# Patient Record
Sex: FEMALE | Race: BLACK OR AFRICAN AMERICAN | NOT HISPANIC OR LATINO | Employment: OTHER | URBAN - METROPOLITAN AREA
[De-identification: names, ages, dates, MRNs, and addresses within clinical notes are randomized per-mention and may not be internally consistent; named-entity substitution may affect disease eponyms.]

---

## 2023-03-20 DIAGNOSIS — I10 BENIGN ESSENTIAL HYPERTENSION: Primary | ICD-10-CM

## 2023-03-20 DIAGNOSIS — E78.2 MIXED HYPERLIPIDEMIA: ICD-10-CM

## 2023-03-20 RX ORDER — VALSARTAN AND HYDROCHLOROTHIAZIDE 160; 12.5 MG/1; MG/1
1 TABLET, FILM COATED ORAL DAILY
COMMUNITY
Start: 2014-04-28 | End: 2023-03-20 | Stop reason: SDUPTHER

## 2023-03-20 RX ORDER — ATORVASTATIN CALCIUM 10 MG/1
10 TABLET, FILM COATED ORAL NIGHTLY
COMMUNITY
Start: 2014-05-27 | End: 2023-03-20 | Stop reason: SDUPTHER

## 2023-03-24 RX ORDER — ATORVASTATIN CALCIUM 10 MG/1
10 TABLET, FILM COATED ORAL NIGHTLY
Qty: 30 TABLET | Refills: 3 | Status: SHIPPED | OUTPATIENT
Start: 2023-03-24

## 2023-03-24 RX ORDER — VALSARTAN AND HYDROCHLOROTHIAZIDE 160; 12.5 MG/1; MG/1
1 TABLET, FILM COATED ORAL DAILY
Qty: 30 TABLET | Refills: 3 | Status: SHIPPED | OUTPATIENT
Start: 2023-03-24

## 2023-03-29 ENCOUNTER — TELEPHONE (OUTPATIENT)
Dept: PRIMARY CARE | Facility: CLINIC | Age: 88
End: 2023-03-29
Payer: MEDICARE

## 2023-04-17 ENCOUNTER — DOCUMENTATION (OUTPATIENT)
Dept: PRIMARY CARE | Facility: CLINIC | Age: 88
End: 2023-04-17
Payer: MEDICARE

## 2023-04-18 NOTE — PROGRESS NOTES
Patient called requesting CT results. I am on call today, so I returned patient's phone call. Discussed that the CT showed a small area of fat necrosis in her abdominal wall and a chronic 1.7cm lesion on her liver that has been there for several years. CT shows slight enlargement. Patient asked if this could be causing her abdominal pain. I explained that I have not seen or examined patient previously, but that it seems unlikely that the liver lesion is causing her pain. Discussed that she could get an MRI to further characterize lesion. She states she would not be interested in surgery, so I agreed with her that it would be reasonable to not do further investigation.

## 2023-05-19 DIAGNOSIS — I10 BENIGN ESSENTIAL HYPERTENSION: Primary | ICD-10-CM

## 2023-05-19 RX ORDER — METOPROLOL SUCCINATE 100 MG/1
1 TABLET, EXTENDED RELEASE ORAL DAILY
COMMUNITY
Start: 2014-05-27 | End: 2023-05-19 | Stop reason: SDUPTHER

## 2023-05-20 RX ORDER — METOPROLOL SUCCINATE 100 MG/1
100 TABLET, EXTENDED RELEASE ORAL DAILY
Qty: 90 TABLET | Refills: 1 | Status: SHIPPED | OUTPATIENT
Start: 2023-05-20 | End: 2023-11-17 | Stop reason: SDUPTHER

## 2023-09-20 PROBLEM — H61.21 EXCESSIVE CERUMEN IN EAR CANAL, RIGHT: Status: ACTIVE | Noted: 2023-09-20

## 2023-09-20 PROBLEM — R92.8 ABNORMAL FINDING ON BREAST IMAGING: Status: ACTIVE | Noted: 2023-09-20

## 2023-09-20 PROBLEM — R20.2 RIGHT HAND PARESTHESIA: Status: ACTIVE | Noted: 2023-09-20

## 2023-09-20 PROBLEM — R82.90 ABNORMAL URINALYSIS: Status: ACTIVE | Noted: 2023-09-20

## 2023-09-20 PROBLEM — R56.9 CONVULSIONS (MULTI): Status: ACTIVE | Noted: 2023-09-20

## 2023-09-20 PROBLEM — E78.2 COMBINED HYPERLIPIDEMIA: Status: ACTIVE | Noted: 2023-09-20

## 2023-09-20 PROBLEM — H61.20 CERUMEN IMPACTION: Status: ACTIVE | Noted: 2023-09-20

## 2023-09-20 PROBLEM — I42.2 HYPERTROPHIC CARDIOMYOPATHY (MULTI): Status: ACTIVE | Noted: 2023-09-20

## 2023-09-20 PROBLEM — D64.9 ANEMIA: Status: ACTIVE | Noted: 2023-09-20

## 2023-09-20 PROBLEM — R33.9 URINARY RETENTION: Status: ACTIVE | Noted: 2023-09-20

## 2023-09-20 PROBLEM — I10 HYPERTENSION: Status: ACTIVE | Noted: 2023-09-20

## 2023-09-20 PROBLEM — C50.911 BREAST CANCER, RIGHT (MULTI): Status: ACTIVE | Noted: 2023-09-20

## 2023-09-20 PROBLEM — I51.7 LVH (LEFT VENTRICULAR HYPERTROPHY): Status: ACTIVE | Noted: 2023-09-20

## 2023-09-20 PROBLEM — M47.816 OSTEOARTHRITIS OF LUMBAR SPINE: Status: ACTIVE | Noted: 2023-09-20

## 2023-09-20 PROBLEM — M79.645 PAIN OF FINGER OF LEFT HAND: Status: ACTIVE | Noted: 2023-09-20

## 2023-09-20 PROBLEM — I70.0 ATHEROSCLEROSIS OF AORTA (CMS-HCC): Status: ACTIVE | Noted: 2023-09-20

## 2023-09-20 PROBLEM — M54.16 LUMBAR RADICULOPATHY: Status: ACTIVE | Noted: 2023-09-20

## 2023-09-20 PROBLEM — N63.10 BREAST MASS, RIGHT: Status: ACTIVE | Noted: 2023-09-20

## 2023-09-20 PROBLEM — N39.0 ACUTE UTI: Status: ACTIVE | Noted: 2023-09-20

## 2023-09-20 PROBLEM — E83.52 HYPERCALCEMIA: Status: ACTIVE | Noted: 2023-09-20

## 2023-09-20 PROBLEM — M81.0 OSTEOPOROSIS: Status: ACTIVE | Noted: 2023-09-20

## 2023-09-20 RX ORDER — LEVETIRACETAM 500 MG/1
2 TABLET, EXTENDED RELEASE ORAL DAILY
COMMUNITY
Start: 2013-12-30 | End: 2023-10-17 | Stop reason: SDUPTHER

## 2023-09-20 RX ORDER — ANASTROZOLE 1 MG/1
TABLET ORAL
COMMUNITY
Start: 2022-08-30 | End: 2024-02-20 | Stop reason: SDUPTHER

## 2023-09-20 RX ORDER — MULTIVIT-MIN/IRON/FOLIC ACID/K 18-600-40
1 CAPSULE ORAL DAILY
COMMUNITY
Start: 2022-08-18

## 2023-09-20 RX ORDER — NALOXONE HYDROCHLORIDE 4 MG/.1ML
SPRAY NASAL
COMMUNITY
Start: 2022-10-24

## 2023-09-20 RX ORDER — CHOLECALCIFEROL (VITAMIN D3) 50 MCG
1 TABLET ORAL DAILY
COMMUNITY

## 2023-09-20 RX ORDER — TRAMADOL HYDROCHLORIDE 50 MG/1
50-100 TABLET ORAL 3 TIMES DAILY PRN
COMMUNITY
End: 2023-10-20 | Stop reason: SDUPTHER

## 2023-09-20 RX ORDER — ASPIRIN 81 MG/1
1 TABLET ORAL DAILY
COMMUNITY

## 2023-10-17 DIAGNOSIS — Z00.00 HEALTH CARE MAINTENANCE: Primary | ICD-10-CM

## 2023-10-19 ENCOUNTER — APPOINTMENT (OUTPATIENT)
Dept: HEMATOLOGY/ONCOLOGY | Facility: CLINIC | Age: 88
End: 2023-10-19
Payer: MEDICARE

## 2023-10-19 RX ORDER — LEVETIRACETAM 500 MG/1
1000 TABLET, EXTENDED RELEASE ORAL DAILY
Qty: 180 TABLET | Refills: 0 | Status: SHIPPED | OUTPATIENT
Start: 2023-10-19 | End: 2024-02-01

## 2023-10-20 ENCOUNTER — APPOINTMENT (OUTPATIENT)
Dept: PRIMARY CARE | Facility: CLINIC | Age: 88
End: 2023-10-20
Payer: MEDICARE

## 2023-10-20 DIAGNOSIS — M54.16 LUMBAR RADICULOPATHY: Primary | ICD-10-CM

## 2023-10-20 DIAGNOSIS — Z02.83 ENCOUNTER FOR DRUG SCREENING: ICD-10-CM

## 2023-10-20 DIAGNOSIS — Z51.81 ENCOUNTER FOR THERAPEUTIC DRUG LEVEL MONITORING: ICD-10-CM

## 2023-10-20 RX ORDER — TRAMADOL HYDROCHLORIDE 50 MG/1
50-100 TABLET ORAL 3 TIMES DAILY PRN
Qty: 180 TABLET | Refills: 0 | Status: SHIPPED | OUTPATIENT
Start: 2023-10-20 | End: 2023-10-24 | Stop reason: SDUPTHER

## 2023-10-24 DIAGNOSIS — M54.16 LUMBAR RADICULOPATHY: ICD-10-CM

## 2023-10-25 ENCOUNTER — LAB (OUTPATIENT)
Dept: LAB | Facility: LAB | Age: 88
End: 2023-10-25
Payer: MEDICARE

## 2023-10-25 ENCOUNTER — OFFICE VISIT (OUTPATIENT)
Dept: HEMATOLOGY/ONCOLOGY | Facility: CLINIC | Age: 88
End: 2023-10-25
Payer: MEDICARE

## 2023-10-25 ENCOUNTER — APPOINTMENT (OUTPATIENT)
Dept: HEMATOLOGY/ONCOLOGY | Facility: CLINIC | Age: 88
End: 2023-10-25
Payer: MEDICARE

## 2023-10-25 VITALS
WEIGHT: 121.69 LBS | OXYGEN SATURATION: 95 % | RESPIRATION RATE: 18 BRPM | TEMPERATURE: 97.5 F | SYSTOLIC BLOOD PRESSURE: 121 MMHG | HEART RATE: 75 BPM | DIASTOLIC BLOOD PRESSURE: 76 MMHG | BODY MASS INDEX: 23.67 KG/M2

## 2023-10-25 DIAGNOSIS — I42.2 HYPERTROPHIC CARDIOMYOPATHY (MULTI): ICD-10-CM

## 2023-10-25 DIAGNOSIS — E24.9 CUSHING'S SYNDROME, UNSPECIFIED (MULTI): ICD-10-CM

## 2023-10-25 DIAGNOSIS — F02.80 DEMENTIA IN OTHER DISEASES CLASSIFIED ELSEWHERE, UNSPECIFIED SEVERITY, WITHOUT BEHAVIORAL DISTURBANCE, PSYCHOTIC DISTURBANCE, MOOD DISTURBANCE, AND ANXIETY (MULTI): Primary | ICD-10-CM

## 2023-10-25 DIAGNOSIS — I70.0 ATHEROSCLEROSIS OF AORTA (CMS-HCC): ICD-10-CM

## 2023-10-25 DIAGNOSIS — Z17.0 MALIGNANT NEOPLASM OF UPPER-OUTER QUADRANT OF RIGHT BREAST IN FEMALE, ESTROGEN RECEPTOR POSITIVE (MULTI): ICD-10-CM

## 2023-10-25 DIAGNOSIS — C50.411 MALIGNANT NEOPLASM OF UPPER-OUTER QUADRANT OF RIGHT BREAST IN FEMALE, ESTROGEN RECEPTOR POSITIVE (MULTI): ICD-10-CM

## 2023-10-25 DIAGNOSIS — Z02.83 ENCOUNTER FOR DRUG SCREENING: ICD-10-CM

## 2023-10-25 DIAGNOSIS — J96.01 ACUTE RESPIRATORY FAILURE WITH HYPOXIA (MULTI): ICD-10-CM

## 2023-10-25 DIAGNOSIS — Z51.81 ENCOUNTER FOR THERAPEUTIC DRUG LEVEL MONITORING: ICD-10-CM

## 2023-10-25 LAB
AMPHETAMINES UR QL SCN: NORMAL
BARBITURATES UR QL SCN: NORMAL
BENZODIAZ UR QL SCN: NORMAL
BZE UR QL SCN: NORMAL
CANNABINOIDS UR QL SCN: NORMAL
FENTANYL+NORFENTANYL UR QL SCN: NORMAL
OPIATES UR QL SCN: NORMAL
OXYCODONE+OXYMORPHONE UR QL SCN: NORMAL
PCP UR QL SCN: NORMAL

## 2023-10-25 PROCEDURE — 99214 OFFICE O/P EST MOD 30 MIN: CPT | Performed by: NURSE PRACTITIONER

## 2023-10-25 PROCEDURE — 1036F TOBACCO NON-USER: CPT | Performed by: NURSE PRACTITIONER

## 2023-10-25 PROCEDURE — 1125F AMNT PAIN NOTED PAIN PRSNT: CPT | Performed by: NURSE PRACTITIONER

## 2023-10-25 PROCEDURE — 80307 DRUG TEST PRSMV CHEM ANLYZR: CPT

## 2023-10-25 PROCEDURE — 80373 DRUG SCREENING TRAMADOL: CPT

## 2023-10-25 PROCEDURE — 3078F DIAST BP <80 MM HG: CPT | Performed by: NURSE PRACTITIONER

## 2023-10-25 PROCEDURE — 3074F SYST BP LT 130 MM HG: CPT | Performed by: NURSE PRACTITIONER

## 2023-10-25 ASSESSMENT — ENCOUNTER SYMPTOMS
OCCASIONAL FEELINGS OF UNSTEADINESS: 0
DEPRESSION: 0
LOSS OF SENSATION IN FEET: 0

## 2023-10-25 ASSESSMENT — PAIN SCALES - GENERAL: PAINLEVEL: 8

## 2023-10-25 NOTE — PROGRESS NOTES
Oncology Follow-Up    Ann Marie Murphy  [id number]          AJCC Edition: 8th (AJCC), Diagnosis Date: 05-Aug-2022, IB, cT2 cN0 cM0 G2   Oncology History    No history exists.   87-year-old postmenopausal AA female with right-sided invasive ductal carcinoma, clinical stage IB (T2 N0 M0). The patient's breast cancer was diagnosed on August 5, 2022, and is grade 2, estrogen receptor positive at 90%, progesterone receptor positive at 30%, and HER-2/german negative. MammaPrint Index = +0.417.     CURRENT THERAPY: Neoadjuvant Arimidex 1 mg by mouth daily      Details of her history are as follows:         Patient palpated a right breast lump x 3 months   08/01/2022: Patient underwent bilateral diagnostic mammogram with right breast US. This revealed extremely dense breast with a spiculated mass in the right breast. US revealed a mass at 10:00, 10cm from the nipple measuring 2.0 x 1.2 x 1.3cm.   08/05/2022: Patient underwent an US-guided core biopsy of the right breast at 10:00, 10 cm from the nipple. Pathology was consistent with invasive ductal carcinoma with receptors as above   08/30/2022: Patient was started on neoadjuvant Arimidex 1mg by mouth daily         Subjective    Ms. Murphy presents for her 3 months follow up as she is on neoadjuvant anastrozole for her T2N0 right breast cancer. She states her right breast feels more full. She is having joint pain and points to a cyst? That she has had for sometime on her right ring finger. Ms. Cardenas continues to tolerate anastrozole well. She Denies any unusual headaches, balance issues, depression, cough, shortness of breath, problems swallowing, changes in chest/breast area, abdominal pain, vaginal bleeding, rectal bleeding, blood in the urine, vaginal dryness, swelling arms or legs, new or unusual skin moles or lesions.         Objective      Vitals:    10/25/23 1043   BP: 121/76   Pulse: 75   Resp: 18   Temp: 36.4 °C (97.5 °F)   SpO2: 95%        Constitutional: Well  developed, alert/oriented x3, no distress, cooperative   Eyes: clear sclera   ENMT: mucous membranes moist, no apparent lesions   Head/Neck: Neck supple, no bruits   Respiratory/Thorax: Patent airways, normal breath sounds with good chest expansion   Cardiovascular: Regular rate and rhythm, no murmurs, 2+ equal pulses of the extremities,   Gastrointestinal: Nondistended, soft, non-tender, no masses palpable, no organomegaly   Musculoskeletal: ROM intact, no joint swelling, normal strength   Extremities: normal extremities, no edema, cyanosis, contusions or wounds   Neurological: alert and oriented x3,  normal strength   Breast:     Lymphatic: No significant lymphadenopathy   Psychological: Appropriate mood and behavior   Skin: Warm and dry, no lesions, no rashes      Physical Exam  Chest:          Comments: Right breast + for known breast cancer. The area is without definitive boarders though thickened, no change in size since her exam 3 months ago (approx 3x3cm). There are no masses, nodules, skin changes, discharge. Right breast without masses, nodules, skin changes, discharge, mostly fat replaced, scattered dense tissue         Lab Results   Component Value Date    WBC 6.6 09/12/2022    HGB 13.7 09/12/2022    HCT 41.4 09/12/2022    MCV 96 09/12/2022     09/12/2022       Chemistry    Lab Results   Component Value Date/Time     09/12/2022 0912    K 3.9 09/12/2022 0912     09/12/2022 0912    CO2 26 09/12/2022 0912    BUN 12 09/12/2022 0912    CREATININE 0.75 09/12/2022 0912    Lab Results   Component Value Date/Time    CALCIUM 10.4 09/12/2022 0912    ALKPHOS 60 09/12/2022 0912    AST 21 09/12/2022 0912    ALT 13 09/12/2022 0912    BILITOT 0.8 09/12/2022 0912              Imaging:  Narrative & Impression   Interpreted By:  SHIVA DEWITT MD  MRN: [id number]  Patient Name: STEPHENIE RUIZ     STUDY:  DIGITAL DIAG MAMMO RIGHT UNILAT;  7/18/2023 11:17 am     ACCESSION NUMBER(S):  [id number]     ORDERING  CLINICIAN:  ARIADNE ALCANTAR     INDICATION:  hx right breast cancer, neoadjuvant anastrozole  C50.919: Breast  cancer, stage 1, estrogen receptor positive Z51.81: Encounter for  monitoring aromatase inhibitor therapy Z09: Oncology follow-up  encounter. Short-term follow-up for grouped right breast  calcifications.     COMPARISON:  Right mammogram 08/01/2022 with right breast biopsy 08/05/2022. Right  mammogram 01/17/2023 with spot magnification views and right  mammogram with spot magnification views 04/18/2023. Right comparison  07/07/2015     FINDINGS:  The breast tissue is heterogeneously dense, which may obscure small  masses. 2D spot magnification views of the central inferior right  breast confirms stable indeterminate grouped calcifications,  unchanged in size number and appearance compared with spot  magnification views 01/17/2023 and 04/18/2023.     The biopsy-proven invasive ductal carcinoma with associated tissue  marker, visualized on the true lateral spot magnification view  remains stable.     IMPRESSION:  Stable indeterminate grouped right breast microcalcifications,  central inferior tissue, unchanged in this patient with known right  breast cancer. Continued short-term surveillance recommended at the  time of the patient's next mammogram for monitoring of neoadjuvant  treatment.     BI-RADS CATEGORY:     Category: 3 - Probably Benign.  Recommendation: 6 Month Follow-up.           Assessment/Plan    Ms. Murphy is an 89 yo woman with a recent hx of T2N0 right IDC diagnosed in August 2022. She is s/p ultrasound guided biopsy, and is currently on laurie-adjuvant anastrozole with good tolerance. There is no evidence of recurrent disease on today's exam.   Plan:  Exam is stable, no apparent increase in size or definitive boarders.  Continue anastrozole 1mg daily.  Discussed purposefully moving hands and fingers to help with the stiffness (she does not have pain).   We can consider changing medications if  stiffness becomes painful.  Return to clinic in January with diagnostic mammogram.  We reviewed signs/symptoms of recurrence including new masses, new pigmented lesion, tugging or pulling of the skin, nipple discharge, rash in or around the chest area, or any new finding that doesn't resolve within a 2-3 weeks.           Lara Becerril, APRN-CNP

## 2023-10-25 NOTE — PATIENT INSTRUCTIONS
Continue anastrozole 1mg daily.  Keep hands/fingers moving frequently to help with stiffness. This can be related to your medication (anastrozole) unless this was a problem prior to taking it. If it becomes painful, we can consider switching medications. We will discuss this at your next visit.  I will see you back in January with your mammogram the same day.   Please call with any concerns at [phone].  Have a Happy, Healthy, Holiday Season!   Thank you for choosing [facility] for your care.

## 2023-10-26 RX ORDER — TRAMADOL HYDROCHLORIDE 50 MG/1
50-100 TABLET ORAL 3 TIMES DAILY PRN
Qty: 180 TABLET | Refills: 0 | Status: SHIPPED | OUTPATIENT
Start: 2023-10-26 | End: 2023-11-20 | Stop reason: SDUPTHER

## 2023-10-27 ENCOUNTER — OFFICE VISIT (OUTPATIENT)
Dept: PRIMARY CARE | Facility: CLINIC | Age: 88
End: 2023-10-27
Payer: MEDICARE

## 2023-10-27 VITALS — WEIGHT: 121 LBS | SYSTOLIC BLOOD PRESSURE: 135 MMHG | BODY MASS INDEX: 23.54 KG/M2 | DIASTOLIC BLOOD PRESSURE: 76 MMHG

## 2023-10-27 DIAGNOSIS — I10 PRIMARY HYPERTENSION: ICD-10-CM

## 2023-10-27 DIAGNOSIS — F02.80 DEMENTIA IN OTHER DISEASES CLASSIFIED ELSEWHERE, UNSPECIFIED SEVERITY, WITHOUT BEHAVIORAL DISTURBANCE, PSYCHOTIC DISTURBANCE, MOOD DISTURBANCE, AND ANXIETY (MULTI): ICD-10-CM

## 2023-10-27 DIAGNOSIS — Z00.00 HEALTH CARE MAINTENANCE: Primary | ICD-10-CM

## 2023-10-27 DIAGNOSIS — E78.2 COMBINED HYPERLIPIDEMIA: ICD-10-CM

## 2023-10-27 DIAGNOSIS — H61.23 BILATERAL IMPACTED CERUMEN: ICD-10-CM

## 2023-10-27 LAB
ALBUMIN SERPL BCP-MCNC: 4 G/DL (ref 3.4–5)
ALP SERPL-CCNC: 74 U/L (ref 33–136)
ALT SERPL W P-5'-P-CCNC: 16 U/L (ref 7–45)
ANION GAP SERPL CALC-SCNC: 15 MMOL/L (ref 10–20)
AST SERPL W P-5'-P-CCNC: 20 U/L (ref 9–39)
BILIRUB SERPL-MCNC: 0.8 MG/DL (ref 0–1.2)
BUN SERPL-MCNC: 16 MG/DL (ref 6–23)
CALCIUM SERPL-MCNC: 10.5 MG/DL (ref 8.6–10.6)
CHLORIDE SERPL-SCNC: 101 MMOL/L (ref 98–107)
CHOLEST SERPL-MCNC: 185 MG/DL (ref 0–199)
CHOLESTEROL/HDL RATIO: 2.2
CO2 SERPL-SCNC: 28 MMOL/L (ref 21–32)
CREAT SERPL-MCNC: 0.73 MG/DL (ref 0.5–1.05)
ERYTHROCYTE [DISTWIDTH] IN BLOOD BY AUTOMATED COUNT: 12.5 % (ref 11.5–14.5)
GFR SERPL CREATININE-BSD FRML MDRD: 80 ML/MIN/1.73M*2
GLUCOSE SERPL-MCNC: 87 MG/DL (ref 74–99)
HCT VFR BLD AUTO: 42.8 % (ref 36–46)
HDLC SERPL-MCNC: 82.4 MG/DL
HGB BLD-MCNC: 13.6 G/DL (ref 12–16)
LDLC SERPL CALC-MCNC: 89 MG/DL
MCH RBC QN AUTO: 30.8 PG (ref 26–34)
MCHC RBC AUTO-ENTMCNC: 31.8 G/DL (ref 32–36)
MCV RBC AUTO: 97 FL (ref 80–100)
NON HDL CHOLESTEROL: 103 MG/DL (ref 0–149)
NRBC BLD-RTO: 0 /100 WBCS (ref 0–0)
PLATELET # BLD AUTO: 229 X10*3/UL (ref 150–450)
PMV BLD AUTO: 11.2 FL (ref 7.5–11.5)
POTASSIUM SERPL-SCNC: 4.1 MMOL/L (ref 3.5–5.3)
PROT SERPL-MCNC: 6.4 G/DL (ref 6.4–8.2)
RBC # BLD AUTO: 4.41 X10*6/UL (ref 4–5.2)
SODIUM SERPL-SCNC: 140 MMOL/L (ref 136–145)
TRIGL SERPL-MCNC: 68 MG/DL (ref 0–149)
TSH SERPL-ACNC: 0.86 MIU/L (ref 0.44–3.98)
VLDL: 14 MG/DL (ref 0–40)
WBC # BLD AUTO: 7.1 X10*3/UL (ref 4.4–11.3)

## 2023-10-27 PROCEDURE — 3075F SYST BP GE 130 - 139MM HG: CPT | Performed by: INTERNAL MEDICINE

## 2023-10-27 PROCEDURE — 80053 COMPREHEN METABOLIC PANEL: CPT

## 2023-10-27 PROCEDURE — 85027 COMPLETE CBC AUTOMATED: CPT

## 2023-10-27 PROCEDURE — 99214 OFFICE O/P EST MOD 30 MIN: CPT | Performed by: INTERNAL MEDICINE

## 2023-10-27 PROCEDURE — G0439 PPPS, SUBSEQ VISIT: HCPCS | Performed by: INTERNAL MEDICINE

## 2023-10-27 PROCEDURE — 1036F TOBACCO NON-USER: CPT | Performed by: INTERNAL MEDICINE

## 2023-10-27 PROCEDURE — 3078F DIAST BP <80 MM HG: CPT | Performed by: INTERNAL MEDICINE

## 2023-10-27 PROCEDURE — 1159F MED LIST DOCD IN RCRD: CPT | Performed by: INTERNAL MEDICINE

## 2023-10-27 PROCEDURE — 69210 REMOVE IMPACTED EAR WAX UNI: CPT | Performed by: INTERNAL MEDICINE

## 2023-10-27 PROCEDURE — 1160F RVW MEDS BY RX/DR IN RCRD: CPT | Performed by: INTERNAL MEDICINE

## 2023-10-27 PROCEDURE — 80061 LIPID PANEL: CPT

## 2023-10-27 PROCEDURE — 84443 ASSAY THYROID STIM HORMONE: CPT

## 2023-10-27 PROCEDURE — 36415 COLL VENOUS BLD VENIPUNCTURE: CPT

## 2023-10-27 PROCEDURE — 1125F AMNT PAIN NOTED PAIN PRSNT: CPT | Performed by: INTERNAL MEDICINE

## 2023-10-27 PROCEDURE — 1170F FXNL STATUS ASSESSED: CPT | Performed by: INTERNAL MEDICINE

## 2023-10-27 NOTE — PROGRESS NOTES
Subjective   Patient ID: Ann Marie Murphy is a 88 y.o. female who presents for No chief complaint on file..    HPI CPE see updated front sheet no chest pain no shortness of breath had her blood pressure medicine increased at cardiology her blood pressure appears to be better at the current time she has been off of her cholesterol medicine for a while secondary to pain in the hands which did not change while she has been off it but she has not yet resumed the medication concerned about her memory but doing okay chronic pain being controlled currently bowels normal no dysuria    Past medical history hypertension hyperlipidemia noted and unchanged    Medications noted atorvastatin and valsartan noted and unchanged except as above    Allergies noted and unchanged    Social history no tobacco    Family history noted and unchanged    Prevention choosing on mammogram and colonoscopy some limited exercise some prior blood work reviewed    Depression screen not depressed    Review of Systems    Objective   There were no vitals taken for this visit.    Physical Exam vital signs noted alert and oriented x3 NCAT PERRLA EOMI nares without discharge OP benign EACs occluded with cerumen TM not visible no AC nodes no JVD or bruit no lid lag no thyromegaly chest clear to auscultation CV regular rate and rhythm S1-S2 without murmur gallop or rub abdomen soft nontender normal active bowel sounds LS spine normal curvature negative straight leg raise negative logroll negative SI joint tenderness extremities no clubbing cyanosis or edema normal distal pulses DTR 2+    Procedure bilateral Loop removal of cerumen TM visible can hear somewhat better no complications    Assessment/Plan impression General medical examination hypertension hyperlipidemia dementia?  Other diagnoses cerumen impaction  Plan has had follow-up with cardiology and evaluation there check Chem-7 advised on glucose potassium and kidney function check CBC advised on  blood count prior iron and vitamin B12 levels adequate check TSH advised on thyroid in the distant past was okay check hepatic panel lipid panel advised on cholesterol cholesterol medicine recall currently she is off of the cholesterol medicine the blood pressure is improved on the doubling of her blood pressure medicine good diet regular exercise increase water consumption no medication is needed for the ears at the current time discussed with vaccinations recheck 3 months based on above TT 60 cc 31

## 2023-10-28 ASSESSMENT — PATIENT HEALTH QUESTIONNAIRE - PHQ9
SUM OF ALL RESPONSES TO PHQ9 QUESTIONS 1 AND 2: 0
1. LITTLE INTEREST OR PLEASURE IN DOING THINGS: NOT AT ALL
2. FEELING DOWN, DEPRESSED OR HOPELESS: NOT AT ALL

## 2023-10-28 ASSESSMENT — ACTIVITIES OF DAILY LIVING (ADL)
GROCERY_SHOPPING: INDEPENDENT
DRESSING: INDEPENDENT
MANAGING_FINANCES: INDEPENDENT
BATHING: INDEPENDENT
DOING_HOUSEWORK: INDEPENDENT
TAKING_MEDICATION: INDEPENDENT

## 2023-10-28 ASSESSMENT — ENCOUNTER SYMPTOMS
DEPRESSION: 0
LOSS OF SENSATION IN FEET: 0
OCCASIONAL FEELINGS OF UNSTEADINESS: 0

## 2023-10-30 LAB
NORTRAMADOL UR-MCNC: >1000 NG/ML
TRAMADOL UR CFM-MCNC: >1000 NG/ML

## 2023-11-06 ENCOUNTER — TELEPHONE (OUTPATIENT)
Dept: PRIMARY CARE | Facility: CLINIC | Age: 88
End: 2023-11-06
Payer: MEDICARE

## 2023-11-06 DIAGNOSIS — I10 BENIGN ESSENTIAL HYPERTENSION: ICD-10-CM

## 2023-11-18 RX ORDER — METOPROLOL SUCCINATE 100 MG/1
100 TABLET, EXTENDED RELEASE ORAL DAILY
Qty: 90 TABLET | Refills: 1 | Status: SHIPPED | OUTPATIENT
Start: 2023-11-18 | End: 2024-04-25

## 2023-11-20 ENCOUNTER — TELEPHONE (OUTPATIENT)
Dept: PAIN MEDICINE | Facility: HOSPITAL | Age: 88
End: 2023-11-20
Payer: MEDICARE

## 2023-11-20 DIAGNOSIS — M54.16 LUMBAR RADICULOPATHY: ICD-10-CM

## 2023-11-20 RX ORDER — TRAMADOL HYDROCHLORIDE 50 MG/1
50-100 TABLET ORAL 3 TIMES DAILY PRN
Qty: 180 TABLET | Refills: 0 | Status: SHIPPED | OUTPATIENT
Start: 2023-11-20 | End: 2023-12-15 | Stop reason: SDUPTHER

## 2023-12-15 DIAGNOSIS — M54.16 LUMBAR RADICULOPATHY: ICD-10-CM

## 2023-12-15 RX ORDER — TRAMADOL HYDROCHLORIDE 50 MG/1
50-100 TABLET ORAL 3 TIMES DAILY PRN
Qty: 180 TABLET | Refills: 0 | Status: SHIPPED | OUTPATIENT
Start: 2023-12-15 | End: 2024-01-24 | Stop reason: SDUPTHER

## 2024-01-17 PROBLEM — J12.82 PNEUMONIA DUE TO CORONAVIRUS DISEASE 2019: Status: ACTIVE | Noted: 2021-12-30

## 2024-01-17 PROBLEM — I10 ESSENTIAL (PRIMARY) HYPERTENSION: Status: ACTIVE | Noted: 2021-12-14

## 2024-01-17 PROBLEM — Z91.81 HISTORY OF FALLING: Status: ACTIVE | Noted: 2022-02-24

## 2024-01-17 PROBLEM — K21.9 GASTRO-ESOPHAGEAL REFLUX DISEASE WITHOUT ESOPHAGITIS: Status: ACTIVE | Noted: 2021-12-14

## 2024-01-17 PROBLEM — T79.5XXA: Status: ACTIVE | Noted: 2024-01-17

## 2024-01-17 PROBLEM — U07.1 COVID-19: Status: ACTIVE | Noted: 2021-12-30

## 2024-01-17 PROBLEM — K57.90 DIVERTICULOSIS OF INTESTINE, PART UNSPECIFIED, WITHOUT PERFORATION OR ABSCESS WITHOUT BLEEDING: Status: ACTIVE | Noted: 2021-12-30

## 2024-01-17 PROBLEM — N13.9 OBSTRUCTIVE AND REFLUX UROPATHY, UNSPECIFIED: Status: ACTIVE | Noted: 2021-12-30

## 2024-01-17 PROBLEM — F41.9 ANXIETY DISORDER, UNSPECIFIED: Status: ACTIVE | Noted: 2021-12-14

## 2024-01-17 PROBLEM — G40.209 LOCALIZATION-RELATED (FOCAL) (PARTIAL) SYMPTOMATIC EPILEPSY AND EPILEPTIC SYNDROMES WITH COMPLEX PARTIAL SEIZURES, NOT INTRACTABLE, WITHOUT STATUS EPILEPTICUS (MULTI): Status: ACTIVE | Noted: 2021-12-30

## 2024-01-17 PROBLEM — Z86.79 HISTORY OF RHEUMATIC FEVER: Status: ACTIVE | Noted: 2024-01-17

## 2024-01-17 PROBLEM — Z87.891 PERSONAL HISTORY OF NICOTINE DEPENDENCE: Status: ACTIVE | Noted: 2021-12-14

## 2024-01-17 PROBLEM — I50.9 HEART FAILURE, UNSPECIFIED (MULTI): Status: ACTIVE | Noted: 2021-12-30

## 2024-01-17 PROBLEM — U07.1 PNEUMONIA DUE TO CORONAVIRUS DISEASE 2019: Status: ACTIVE | Noted: 2021-12-30

## 2024-01-17 PROBLEM — Z86.73 HISTORY OF CVA (CEREBROVASCULAR ACCIDENT) WITHOUT RESIDUAL DEFICITS: Status: ACTIVE | Noted: 2021-12-30

## 2024-01-17 PROBLEM — E78.5 HYPERLIPIDEMIA, UNSPECIFIED: Status: ACTIVE | Noted: 2021-12-14

## 2024-01-17 PROBLEM — Z79.82 LONG TERM (CURRENT) USE OF ASPIRIN: Status: ACTIVE | Noted: 2022-02-24

## 2024-01-17 RX ORDER — MIDODRINE HYDROCHLORIDE 10 MG/1
TABLET ORAL
COMMUNITY
Start: 2022-02-24

## 2024-01-17 RX ORDER — ACETAMINOPHEN 325 MG/1
TABLET ORAL
COMMUNITY
Start: 2022-02-24

## 2024-01-17 RX ORDER — RABEPRAZOLE SODIUM 20 MG/1
1 TABLET, DELAYED RELEASE ORAL DAILY
COMMUNITY
Start: 2016-12-20

## 2024-01-17 RX ORDER — FAMOTIDINE 20 MG/1
TABLET, FILM COATED ORAL
COMMUNITY
Start: 2022-02-24

## 2024-01-17 RX ORDER — FLUTICASONE PROPIONATE 50 MCG
SPRAY, SUSPENSION (ML) NASAL
COMMUNITY
Start: 2022-02-24

## 2024-01-17 RX ORDER — VALSARTAN AND HYDROCHLOROTHIAZIDE 320; 25 MG/1; MG/1
1 TABLET, FILM COATED ORAL DAILY
COMMUNITY
Start: 2023-11-29 | End: 2024-02-20

## 2024-01-17 RX ORDER — VALSARTAN AND HYDROCHLOROTHIAZIDE 80; 12.5 MG/1; MG/1
TABLET, FILM COATED ORAL
COMMUNITY
Start: 2022-02-24

## 2024-01-17 RX ORDER — IBUPROFEN 100 MG/5ML
SUSPENSION, ORAL (FINAL DOSE FORM) ORAL
COMMUNITY
Start: 2022-02-24

## 2024-01-17 RX ORDER — MIRTAZAPINE 15 MG/1
TABLET, FILM COATED ORAL
COMMUNITY
Start: 2022-02-24

## 2024-01-17 RX ORDER — LEVETIRACETAM 500 MG/1
TABLET ORAL
COMMUNITY
Start: 2022-02-24 | End: 2024-01-22 | Stop reason: SDUPTHER

## 2024-01-18 ENCOUNTER — ANCILLARY PROCEDURE (OUTPATIENT)
Dept: RADIOLOGY | Facility: CLINIC | Age: 89
End: 2024-01-18
Payer: MEDICARE

## 2024-01-18 ENCOUNTER — OFFICE VISIT (OUTPATIENT)
Dept: HEMATOLOGY/ONCOLOGY | Facility: CLINIC | Age: 89
End: 2024-01-18
Payer: MEDICARE

## 2024-01-18 VITALS
WEIGHT: 123.4 LBS | DIASTOLIC BLOOD PRESSURE: 78 MMHG | BODY MASS INDEX: 24.01 KG/M2 | SYSTOLIC BLOOD PRESSURE: 129 MMHG | HEART RATE: 70 BPM

## 2024-01-18 DIAGNOSIS — Z00.00 HEALTH CARE MAINTENANCE: ICD-10-CM

## 2024-01-18 DIAGNOSIS — Z17.0 MALIGNANT NEOPLASM OF UPPER-OUTER QUADRANT OF RIGHT BREAST IN FEMALE, ESTROGEN RECEPTOR POSITIVE (MULTI): ICD-10-CM

## 2024-01-18 DIAGNOSIS — Z79.811 ENCOUNTER FOR MONITORING AROMATASE INHIBITOR THERAPY: Primary | ICD-10-CM

## 2024-01-18 DIAGNOSIS — R92.8 OTHER ABNORMAL AND INCONCLUSIVE FINDINGS ON DIAGNOSTIC IMAGING OF BREAST: ICD-10-CM

## 2024-01-18 DIAGNOSIS — Z51.81 ENCOUNTER FOR MONITORING AROMATASE INHIBITOR THERAPY: Primary | ICD-10-CM

## 2024-01-18 DIAGNOSIS — C50.411 MALIGNANT NEOPLASM OF UPPER-OUTER QUADRANT OF RIGHT BREAST IN FEMALE, ESTROGEN RECEPTOR POSITIVE (MULTI): ICD-10-CM

## 2024-01-18 PROCEDURE — 1159F MED LIST DOCD IN RCRD: CPT | Performed by: NURSE PRACTITIONER

## 2024-01-18 PROCEDURE — 77062 BREAST TOMOSYNTHESIS BI: CPT

## 2024-01-18 PROCEDURE — 99214 OFFICE O/P EST MOD 30 MIN: CPT | Performed by: NURSE PRACTITIONER

## 2024-01-18 PROCEDURE — 1126F AMNT PAIN NOTED NONE PRSNT: CPT | Performed by: NURSE PRACTITIONER

## 2024-01-18 PROCEDURE — G0279 TOMOSYNTHESIS, MAMMO: HCPCS | Performed by: RADIOLOGY

## 2024-01-18 PROCEDURE — 1160F RVW MEDS BY RX/DR IN RCRD: CPT | Performed by: NURSE PRACTITIONER

## 2024-01-18 PROCEDURE — 77066 DX MAMMO INCL CAD BI: CPT | Performed by: RADIOLOGY

## 2024-01-18 PROCEDURE — 1036F TOBACCO NON-USER: CPT | Performed by: NURSE PRACTITIONER

## 2024-01-18 PROCEDURE — 3074F SYST BP LT 130 MM HG: CPT | Performed by: NURSE PRACTITIONER

## 2024-01-18 PROCEDURE — 3078F DIAST BP <80 MM HG: CPT | Performed by: NURSE PRACTITIONER

## 2024-01-18 PROCEDURE — 76982 USE 1ST TARGET LESION: CPT | Mod: RT

## 2024-01-18 PROCEDURE — 76642 ULTRASOUND BREAST LIMITED: CPT | Mod: RT

## 2024-01-18 ASSESSMENT — PAIN SCALES - GENERAL: PAINLEVEL: 0-NO PAIN

## 2024-01-18 NOTE — PROGRESS NOTES
Oncology Follow-Up    Ann Marie Murphy  [id number]       AJCC Edition: 8th (AJCC), Diagnosis Date: 05-Aug-2022, IB, cT2 cN0 cM0 G2    Oncology History    No history exists.     Patient's Oncology History documentation       Oncology History     No history exists.      87-year-old postmenopausal AA female with right-sided invasive ductal carcinoma, clinical stage IB (T2 N0 M0). The patient's breast cancer was diagnosed on August 5, 2022, and is grade 2, estrogen receptor positive at 90%, progesterone receptor positive at 30%, and HER-2/german negative. MammaPrint Index = +0.417.     CURRENT THERAPY: Neoadjuvant Arimidex 1 mg by mouth daily      Details of her history are as follows:         Patient palpated a right breast lump x 3 months   08/01/2022: Patient underwent bilateral diagnostic mammogram with right breast US. This revealed extremely dense breast with a spiculated mass in the right breast. US revealed a mass at 10:00, 10cm from the nipple measuring 2.0 x 1.2 x 1.3cm.   08/05/2022: Patient underwent an US-guided core biopsy of the right breast at 10:00, 10 cm from the nipple. Pathology was consistent with invasive ductal carcinoma with receptors as above   08/30/2022: Patient was started on neoadjuvant Arimidex 1mg by mouth daily      Subjective    Ann Marie presents accompanied by her daughter for her mammogram and exam. Recall, she is on laurie-adjuvant anastrozole for a T2N0 right IDC grade III. Ann Marie reports no health changes. She continues to have lower left abdominal pain that has been worked up with negative results through her PCP. Ann Marie denies any unusual headaches, balance issues, depression, cough, shortness of breath, problems swallowing, changes in chest/breast area, abdominal pain, bone or muscle pain, vaginal bleeding, rectal bleeding, blood in the urine, vaginal dryness, swelling arms or legs, new or unusual skin moles or lesions.     Objective      Vitals:    01/18/24 1143   BP: 129/78    Pulse: 70        Constitutional: Well developed, alert/oriented x3, no distress, cooperative   Eyes: clear sclera   ENMT: mucous membranes moist, no apparent lesions   Head/Neck: Neck supple, no bruits   Respiratory/Thorax: Patent airways, normal breath sounds with good chest expansion   Cardiovascular: Regular rate and rhythm, no murmurs, 2+ equal pulses of the extremities,   Gastrointestinal: Nondistended, soft, non-tender, no masses palpable, no organomegaly   Musculoskeletal: ROM intact, no joint swelling, normal strength   Extremities: normal extremities, no edema, cyanosis, contusions or wounds   Neurological: alert and oriented x3,  normal strength   Breast:     Lymphatic: No significant lymphadenopathy   Psychological: Appropriate mood and behavior   Skin: Warm and dry, no lesions, no rashes      Physical Exam  Chest:          Comments: Right breast with thickening in the UOQ. There are not definitive boarders, approximately 3x3cm. No palpable left axillary lymph nodes. Right breast and axilla without masses, nodules, skin changes, discharge.          Lab Results   Component Value Date    WBC 7.1 10/27/2023    HGB 13.6 10/27/2023    HCT 42.8 10/27/2023    MCV 97 10/27/2023     10/27/2023       Chemistry    Lab Results   Component Value Date/Time     10/27/2023 1133    K 4.1 10/27/2023 1133     10/27/2023 1133    CO2 28 10/27/2023 1133    BUN 16 10/27/2023 1133    CREATININE 0.73 10/27/2023 1133    Lab Results   Component Value Date/Time    CALCIUM 10.5 10/27/2023 1133    ALKPHOS 74 10/27/2023 1133    AST 20 10/27/2023 1133    ALT 16 10/27/2023 1133    BILITOT 0.8 10/27/2023 1133              Imaging:  Narrative & Impression   Interpreted By:  Brenton Oden,   STUDY:  BI MAMMO BILATERAL DIAGNOSTIC TOMOSYNTHESIS;  1/18/2024 11:25 am      ACCESSION NUMBER(S):  [id number]      ORDERING CLINICIAN:  ARIADNE ALCANTAR      INDICATION:  Patient with a biopsy-proven malignancy in the right breast  who is  undergoing endocrine therapy only. Presents for imaging follow-up.      COMPARISON:  07/18/2023, 04/18/2023, 01/17/2023      FINDINGS:  2D and tomosynthesis images were reviewed at 1 mm slice thickness.      Density:  The breast tissue is heterogeneously dense, which may  obscure small masses.      The patient's biopsy-proven malignancy is seen in the upper-outer  quadrant of the right breast at a posterior depth with an associated  biopsy clip. Mammographically it appears stable. The group of  microcalcifications for which follow-up was recommended underwent  evaluation with magnification views. They are identified along the  lower central position at a middle depth. These continue to evolve  and now have a benign appearance. These also have been stable for  over 2 years and therefore require no further evaluation.      The patient went on for ultrasound evaluation of her known malignancy.      Sonographic images were obtained of the right breast at the 10  o'clock position 10 cm from the nipple. The previously described mass  is noted again. There is an adjacent biopsy clip. It is unchanged in  its sonographic characteristics. Today measures 1.1 x 0.7 x 0.8 cm.  When accounting for differences in measurement and technique there is  no significant interval change.      IMPRESSION:  Stable biopsy-proven malignancy at the 10 o'clock position of the  right breast. Imaging follow-up should be determined on a clinical  basis.      Calcifications for which follow-up was recommended now demonstrate  more benign features and have been stable for over 2 years. These are  now considered benign. No further imaging follow-up is needed.      No mammographic evidence of malignancy in the left breast.      BI-RADS CATEGORY:      BI-RADS CATEGORY:  6 Known Biopsy-Proven Malignancy.  Recommendation:  Follow previous recommendations.  Recommended Date:  Immediate.  Laterality:  Right.     Assessment/Plan    Ms. Murphy is an  87 yo woman with a hx of T2N0 right IDC diagnosed August 2022. She is on neoadjuvant therapy with anastrozole with good tolerance. Today's mammogram/ultrasound shows stable disease.   Plan:  Mammogram and ultrasound results discussed with Ms. Murphy and her daughter. The areas of calcifications no longer need to be followed as they have been stable for 2 years, have benign appearance, and require no additional imaging.   Continue anastrozole 1mg daily.  We reviewed signs/symptoms of recurrence including new masses, new pigmented lesion, tugging or pulling of the skin, nipple discharge, rash in or around the chest area, or any new finding that doesn't resolve within a 2-3 weeks.   All of Ann Marie's questions/concerns were addressed.  Over 20 minutes of time was spent with this patient with >50% of the time with education, counseling, and coordination of care.   I will see Ms. Murphy back in 3 months. Her next imaging will be in 6 months.    Diagnoses and all orders for this visit:  Encounter for monitoring aromatase inhibitor therapy  Malignant neoplasm of upper-outer quadrant of right breast in female, estrogen receptor positive (CMS/HCC)  -     Clinic Appointment Request Follow Up; ARIADNE ALCANTAR; [facility] [id number] MEDON  -     Clinic Appointment Request Follow Up; SUE MOLINA; [facility]-CNP

## 2024-01-22 DIAGNOSIS — Z00.00 HEALTH CARE MAINTENANCE: Primary | ICD-10-CM

## 2024-01-23 DIAGNOSIS — Z00.00 HEALTH CARE MAINTENANCE: ICD-10-CM

## 2024-01-23 RX ORDER — LEVETIRACETAM 500 MG/1
TABLET ORAL
Qty: 180 TABLET | Refills: 0 | Status: SHIPPED | OUTPATIENT
Start: 2024-01-23 | End: 2024-04-24 | Stop reason: SDUPTHER

## 2024-01-24 ENCOUNTER — OFFICE VISIT (OUTPATIENT)
Dept: PAIN MEDICINE | Facility: CLINIC | Age: 89
End: 2024-01-24
Payer: MEDICARE

## 2024-01-24 DIAGNOSIS — Z79.899 ENCOUNTER FOR LONG-TERM (CURRENT) USE OF HIGH-RISK MEDICATION: Primary | ICD-10-CM

## 2024-01-24 DIAGNOSIS — M54.16 LUMBAR RADICULOPATHY: ICD-10-CM

## 2024-01-24 DIAGNOSIS — R10.9 CHRONIC ABDOMINAL PAIN: ICD-10-CM

## 2024-01-24 DIAGNOSIS — G89.29 CHRONIC ABDOMINAL PAIN: ICD-10-CM

## 2024-01-24 DIAGNOSIS — M47.816 OSTEOARTHRITIS OF LUMBAR SPINE, UNSPECIFIED SPINAL OSTEOARTHRITIS COMPLICATION STATUS: ICD-10-CM

## 2024-01-24 PROCEDURE — 99213 OFFICE O/P EST LOW 20 MIN: CPT | Performed by: NURSE PRACTITIONER

## 2024-01-24 PROCEDURE — 1160F RVW MEDS BY RX/DR IN RCRD: CPT | Performed by: NURSE PRACTITIONER

## 2024-01-24 PROCEDURE — 1159F MED LIST DOCD IN RCRD: CPT | Performed by: NURSE PRACTITIONER

## 2024-01-24 PROCEDURE — 1126F AMNT PAIN NOTED NONE PRSNT: CPT | Performed by: NURSE PRACTITIONER

## 2024-01-24 PROCEDURE — 1036F TOBACCO NON-USER: CPT | Performed by: NURSE PRACTITIONER

## 2024-01-24 RX ORDER — TRAMADOL HYDROCHLORIDE 50 MG/1
50-100 TABLET ORAL 3 TIMES DAILY PRN
Qty: 180 TABLET | Refills: 0 | Status: SHIPPED | OUTPATIENT
Start: 2024-01-24 | End: 2024-02-22 | Stop reason: SDUPTHER

## 2024-01-24 NOTE — PROGRESS NOTES
Chief Complaint/HPI:  Pt. Presents today for medication refill. PMH includes chronic abdominal pain and chronic low back pain. She rates this 10/10 today, as she is having increased pain today. She is maintained on tramadol  mg TID prn. This remains somewhat effective and she denies adverse side effects.    OARRS:  Miriam Nixon, APRN-CNP on 1/24/2024  7:50 AM  I have personally reviewed the OARRS report for Ann Marie Murphy. I have considered the risks of abuse, dependence, addiction and diversion    Is the patient prescribed a combination of a benzodiazepine and opioid?  No    Last Urine Drug Screen / ordered today: Yes  Recent Results (from the past 8760 hour(s))   Tramadol Confirmation    Collection Time: 10/25/23 11:43 AM   Result Value Ref Range    Tramadol >1,000 (H) <50 ng/mL    O-Desmethyltramadol >1,000 (H) <50 ng/mL   Drug Screen, Urine With Reflex to Confirmation    Collection Time: 10/25/23 11:43 AM   Result Value Ref Range    Amphetamine Screen, Urine Presumptive Negative Presumptive Negative    Barbiturate Screen, Urine Presumptive Negative Presumptive Negative    Benzodiazepines Screen, Urine Presumptive Negative Presumptive Negative    Cannabinoid Screen, Urine Presumptive Negative Presumptive Negative    Cocaine Metabolite Screen, Urine Presumptive Negative Presumptive Negative    Fentanyl Screen, Urine Presumptive Negative Presumptive Negative    Opiate Screen, Urine Presumptive Negative Presumptive Negative    Oxycodone Screen, Urine Presumptive Negative Presumptive Negative    PCP Screen, Urine Presumptive Negative Presumptive Negative     Results are as expected.     Controlled Substance Agreement:  Date of the Last Agreement: 1/24/24  Reviewed Controlled Substance Agreement including but not limited to the benefits, risks, and alternatives to treatment with a Controlled Substance medication(s).    ROS otherwise negative aside from what was mentioned above in HPI.      Patient Active  Problem List   Diagnosis    Hypertrophic cardiomyopathy (CMS/HCC)    Hypertension    Hypercalcemia    Excessive cerumen in ear canal, right    Cerumen impaction    Convulsions (CMS/HCC)    Combined hyperlipidemia    Breast mass, right    Breast cancer, right (CMS/HCC)    Atherosclerosis of aorta (CMS/HCC)    Acute UTI    Abnormal finding on breast imaging    Abnormal urinalysis    Anemia    Osteoporosis    Osteoarthritis of lumbar spine    LVH (left ventricular hypertrophy)    Lumbar radiculopathy    Pain of finger of left hand    Right hand paresthesia    Urinary retention    Dementia in other diseases classified elsewhere, unspecified severity, without behavioral disturbance, psychotic disturbance, mood disturbance, and anxiety (CMS/HCC)    Acute respiratory failure with hypoxia (CMS/HCC)    Cushing's syndrome, unspecified (CMS/HCC)    COVID-19    History of falling    Long term (current) use of aspirin    Personal history of nicotine dependence    Crush syndrome (CMS/HCC)    Anxiety disorder, unspecified    Diverticulosis of intestine, part unspecified, without perforation or abscess without bleeding    Gastro-esophageal reflux disease without esophagitis    Heart failure, unspecified (CMS/HCC)    History of CVA (cerebrovascular accident) without residual deficits    History of rheumatic fever    Localization-related (focal) (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, not intractable, without status epilepticus (CMS/HCC)    Obstructive and reflux uropathy, unspecified    Pneumonia due to coronavirus disease 2019    Hyperlipidemia, unspecified    Essential (primary) hypertension     Past Medical History:   Diagnosis Date    Anxiety disorder due to known physiological condition     Anxiety disorder due to a general medical condition    Cardiomyopathy, unspecified (CMS/HCC)     Cardiomyopathy    Diverticulosis of intestine, part unspecified, without perforation or abscess  without bleeding     Diverticulosis    Encounter for screening mammogram for malignant neoplasm of breast     Visit for screening mammogram    Localization-related (focal) (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, not intractable, without status epilepticus (CMS/HCC)     Complex partial seizure    Other conditions influencing health status     Ovarian Torsion On The Left    Other conditions influencing health status     Stroke syndrome    Other specified noninflammatory disorders of uterus     Fibrosis of uterus    Personal history of other diseases of the circulatory system     History of hypertension    Personal history of other diseases of the circulatory system     History of hypertrophic cardiomyopathy    Personal history of other diseases of the digestive system     History of cholelithiasis    Personal history of other diseases of the digestive system     History of hemorrhoids    Personal history of other diseases of the digestive system     History of hiatal hernia    Personal history of other diseases of the digestive system     History of esophageal reflux    Personal history of other diseases of the female genital tract 01/14/2020    History of breast pain    Personal history of other endocrine, nutritional and metabolic disease     History of hypercholesterolemia    Personal history of other endocrine, nutritional and metabolic disease 03/24/2016    History of Cushing's syndrome    Personal history of transient ischemic attack (TIA), and cerebral infarction without residual deficits     History of transient cerebral ischemia    Unspecified convulsions (CMS/HCC)     Generalized convulsive seizure     Past Surgical History:   Procedure Laterality Date    APPENDECTOMY  01/02/2014    Appendectomy    COLONOSCOPY  01/02/2014    Complete Colonoscopy    EXPLORATORY LAPAROTOMY  01/02/2014    Exploratory Laparotomy    HERNIA REPAIR  01/02/2014    Hernia Repair     "HYSTERECTOMY  2014    Hysterectomy    OTHER SURGICAL HISTORY  2014    Upper Gastrointestinal Endoscopy (Therapeutic)    OTHER SURGICAL HISTORY  2014    Surgery    UMBILICAL HERNIA REPAIR  2014    Umbilical Hernia Repair     Family History   Problem Relation Name Age of Onset    Lung cancer Father      Other (Carcinoma of the pancreas) Sister      Kidney cancer Sister      Arrhythmia Brother          ALl 3 Brothers     Other (Parkinson disease) Brother      Prostate cancer Brother       Social History     Tobacco Use    Smoking status: Former     Types: Cigarettes    Smokeless tobacco: Never   Substance Use Topics    Alcohol use: Not Currently    Drug use: Not Currently         ALLERGIES  Allergies   Allergen Reactions    Adhesive Tape-Silicones Unknown    Cortisone Swelling    Diphenhydramine Hallucinations and Other     \"goes out of head\"    Latex Unknown    Morphine Unknown         MEDICATIONS  Current Outpatient Medications   Medication Sig Dispense Refill    acetaminophen (Tylenol) 325 mg tablet 2 tablet EVERY 4 HOURS (route: oral)      anastrozole (Arimidex) 1 mg tablet Take by mouth.      ascorbic acid (Vitamin C) 1,000 mg tablet 1 tablet DAILY (route: oral)      ascorbic acid, vitamin C, 500 mg capsule Take 1 capsule by mouth once daily.      aspirin 81 mg EC tablet Take 1 tablet (81 mg) by mouth once daily.      atorvastatin (Lipitor) 10 mg tablet Take 1 tablet (10 mg) by mouth once daily at bedtime. 30 tablet 3    cholecalciferol (Vitamin D-3) 50 MCG (2000 UT) tablet Take 1 tablet (2,000 Units) by mouth once daily.      famotidine (Pepcid) 20 mg tablet 1 tablet DAILY (route: oral)      fluticasone (Flonase Allergy Relief) 50 mcg/actuation nasal spray 2 spray DAILY (route: nasal)      Lactobacillus acidophilus capsule 1 capsule DAILY (route: oral)      levETIRAcetam (Keppra) 500 mg tablet 2 tablet DAILY (route: oral) 180 tablet 0    levETIRAcetam XR (Keppra XR) 500 mg 24 hr " tablet Take 2 tablets (1,000 mg) by mouth once daily. (Patient not taking: Reported on 1/18/2024) 180 tablet 0    metoprolol succinate XL (Toprol-XL) 100 mg 24 hr tablet Take 1 tablet (100 mg) by mouth once daily. 90 tablet 1    midodrine (Proamatine) 10 mg tablet Per instructions AS NEEDED (route: oral)      mirtazapine (Remeron) 15 mg tablet .5 tablet BEDTIME (route: oral)      naloxone (Narcan) 4 mg/0.1 mL nasal spray Administer into affected nostril(s).      RABEprazole (Aciphex) EC tablet Take 1 tablet (20 mg) by mouth once daily.      traMADol (Ultram) 50 mg tablet Take 1-2 tablets ( mg) by mouth 3 times a day as needed for severe pain (7 - 10). 180 tablet 0    valsartan-hydrochlorothiazide (Diovan-HCT) 160-12.5 mg tablet Take 1 tablet by mouth once daily. 30 tablet 3    valsartan-hydrochlorothiazide (Diovan-HCT) 320-25 mg tablet Take 1 tablet by mouth once daily.      valsartan-hydrochlorothiazide (Diovan-HCT) 80-12.5 mg tablet Per instructions DAILY (route: oral)       No current facility-administered medications for this visit.     PHYSICAL EXAM  Gen: Alert, NAD  HEENT:  PERRLA, EOMI, conjunctiva and sclera normal in appearance  Respiratory:  Normal effort, no SOB  Cardiovascular:  Heart RRR.   Neuro:  Gross motor and sensory intact  Skin:  No suspicious lesions present on visible skin    ASSESSMENT/PLAN  Problem List Items Addressed This Visit       Lumbar radiculopathy - Primary    Relevant Medications    traMADol (Ultram) 50 mg tablet       Encounter Diagnoses   Name Primary?    Lumbar radiculopathy     Chronic abdominal pain     Osteoarthritis of lumbar spine, unspecified spinal osteoarthritis complication status     Encounter for long-term (current) use of high-risk medication Yes     Will continue plan of care as established by Dr. Lucero.  -Continue tramadol as currently prescribed  -UDS is up to date  -CSA updated today  -RTC 3 mos or as needed. Pt. May do virtual visits for every other  visit.    The patient was counseled on the appropriate use of the opioid medication, its potential dangers and the responsibilities that go along with being prescribed opioid medications including safe storage, use and disposal of the medication. Patient was also counseled on the adverse effects of long-term opioid use including hyperalgesia, tolerance, decreased immune function, and changes to the body's natural hormones.  The patient also understands the potential risks for respiratory depression especially if the medication is combined with other central nervous system depressants such as benzodiazepines or alcohol.    Miriam Nixon, APRN-CNP

## 2024-01-29 NOTE — PATIENT INSTRUCTIONS
Continue anastrozole 1mg daily.  Your imaging shows stable disease. The calcifications that they were following are stable for 2 years. They do not need any further follow up.  We will continue on with every 3 month visits and every 6 month mammogram.  Please call if your mass feels larger, any, new pigmented lesion, tugging or pulling of the skin, nipple discharge, rash in or around the chest area, or any new finding that doesn't resolve within a 2-3 weeks.  It was nice seeing your today, Ms. Murphy.  Your next appointment will be with Dr. Morales in April. I will see you back in 6 months with your mammogram.  Thank you for choosing [facility] for your care.

## 2024-02-01 RX ORDER — LEVETIRACETAM 500 MG/1
TABLET, EXTENDED RELEASE ORAL
Qty: 180 TABLET | Refills: 0 | Status: SHIPPED | OUTPATIENT
Start: 2024-02-01

## 2024-02-03 RX ORDER — LEVETIRACETAM 500 MG/1
TABLET, EXTENDED RELEASE ORAL
Qty: 180 TABLET | Refills: 0 | Status: SHIPPED | OUTPATIENT
Start: 2024-02-03

## 2024-02-20 ENCOUNTER — TELEPHONE (OUTPATIENT)
Dept: ADMISSION | Facility: HOSPITAL | Age: 89
End: 2024-02-20
Payer: MEDICARE

## 2024-02-20 DIAGNOSIS — C50.811 MALIGNANT NEOPLASM OF OVERLAPPING SITES OF RIGHT BREAST IN FEMALE, ESTROGEN RECEPTOR POSITIVE (MULTI): Primary | ICD-10-CM

## 2024-02-20 DIAGNOSIS — Z17.0 MALIGNANT NEOPLASM OF OVERLAPPING SITES OF RIGHT BREAST IN FEMALE, ESTROGEN RECEPTOR POSITIVE (MULTI): Primary | ICD-10-CM

## 2024-02-20 RX ORDER — ANASTROZOLE 1 MG/1
1 TABLET ORAL DAILY
Qty: 90 TABLET | Refills: 0 | Status: SHIPPED | OUTPATIENT
Start: 2024-02-20 | End: 2024-04-12 | Stop reason: SDUPTHER

## 2024-02-22 DIAGNOSIS — M54.16 LUMBAR RADICULOPATHY: ICD-10-CM

## 2024-02-22 RX ORDER — TRAMADOL HYDROCHLORIDE 50 MG/1
50-100 TABLET ORAL 3 TIMES DAILY PRN
Qty: 180 TABLET | Refills: 0 | Status: SHIPPED | OUTPATIENT
Start: 2024-02-22 | End: 2024-04-22 | Stop reason: SDUPTHER

## 2024-03-21 DIAGNOSIS — M54.16 LUMBAR RADICULOPATHY: ICD-10-CM

## 2024-04-04 ENCOUNTER — APPOINTMENT (OUTPATIENT)
Dept: HEMATOLOGY/ONCOLOGY | Facility: CLINIC | Age: 89
End: 2024-04-04
Payer: MEDICARE

## 2024-04-12 ENCOUNTER — OFFICE VISIT (OUTPATIENT)
Dept: HEMATOLOGY/ONCOLOGY | Facility: CLINIC | Age: 89
End: 2024-04-12
Payer: MEDICARE

## 2024-04-12 VITALS
TEMPERATURE: 97.3 F | DIASTOLIC BLOOD PRESSURE: 83 MMHG | HEART RATE: 71 BPM | SYSTOLIC BLOOD PRESSURE: 139 MMHG | WEIGHT: 122.69 LBS | BODY MASS INDEX: 23.87 KG/M2

## 2024-04-12 DIAGNOSIS — Z17.0 MALIGNANT NEOPLASM OF UPPER-OUTER QUADRANT OF RIGHT BREAST IN FEMALE, ESTROGEN RECEPTOR POSITIVE (MULTI): ICD-10-CM

## 2024-04-12 DIAGNOSIS — Z17.0 MALIGNANT NEOPLASM OF OVERLAPPING SITES OF RIGHT BREAST IN FEMALE, ESTROGEN RECEPTOR POSITIVE (MULTI): ICD-10-CM

## 2024-04-12 DIAGNOSIS — C50.811 MALIGNANT NEOPLASM OF OVERLAPPING SITES OF RIGHT BREAST IN FEMALE, ESTROGEN RECEPTOR POSITIVE (MULTI): ICD-10-CM

## 2024-04-12 DIAGNOSIS — C50.411 MALIGNANT NEOPLASM OF UPPER-OUTER QUADRANT OF RIGHT BREAST IN FEMALE, ESTROGEN RECEPTOR POSITIVE (MULTI): ICD-10-CM

## 2024-04-12 PROCEDURE — 1159F MED LIST DOCD IN RCRD: CPT | Performed by: INTERNAL MEDICINE

## 2024-04-12 PROCEDURE — 99214 OFFICE O/P EST MOD 30 MIN: CPT | Performed by: INTERNAL MEDICINE

## 2024-04-12 PROCEDURE — 1160F RVW MEDS BY RX/DR IN RCRD: CPT | Performed by: INTERNAL MEDICINE

## 2024-04-12 PROCEDURE — 1126F AMNT PAIN NOTED NONE PRSNT: CPT | Performed by: INTERNAL MEDICINE

## 2024-04-12 PROCEDURE — 3075F SYST BP GE 130 - 139MM HG: CPT | Performed by: INTERNAL MEDICINE

## 2024-04-12 PROCEDURE — 3079F DIAST BP 80-89 MM HG: CPT | Performed by: INTERNAL MEDICINE

## 2024-04-12 ASSESSMENT — PAIN SCALES - GENERAL: PAINLEVEL: 0-NO PAIN

## 2024-04-12 NOTE — PROGRESS NOTES
Patient Visit Information:   Visit Type: Follow Up Visit      Cancer History:          Breast         AJCC Edition: 8th (AJCC), Diagnosis Date: 05-Aug-2022, IB, cT2 cN0 cM0 G2     Treatment Synopsis:    88-year-old postmenopausal AA female with right-sided invasive ductal carcinoma, clinical stage IB (T2 N0 M0). The patient's breast cancer was diagnosed on August 5, 2022, and is grade 2, estrogen receptor positive at 90%, progesterone receptor positive at 30%, and HER-2/german negative. MammaPrint Index = +0.417.     CURRENT THERAPY: Primary Hormonal therapy with Arimidex 1 mg by mouth daily      Details of her history are as follows:         Patient palpated a right breast lump x 3 months   08/01/2022: Patient underwent bilateral diagnostic mammogram with right breast US. This revealed extremely dense breast with a spiculated mass in the right breast. US revealed a mass at 10:00, 10cm from the nipple measuring 2.0 x 1.2 x 1.3cm.   08/05/2022: Patient underwent an US-guided core biopsy of the right breast at 10:00, 10 cm from the nipple. Pathology was consistent with invasive ductal carcinoma with receptors as above   08/30/2022: Patient was started on Arimidex 1mg by mouth daily              History of Present Illness:      ID Statement:    STEPHENIE RUIZ is a 87 year old Female        Chief Complaint: 87 year old post-menopausal AA female  with clinical stage IB right-sided breast cancer. Patient is here for a follow-up visit.   Interval History:    Ms. Ruiz is a pleasant 87-year-old postmenopausal AA female with right-sided invasive ductal carcinoma, clinical stage IB (T2 N0 M0). The patient's breast cancer  was diagnosed on August 5, 2022, and is grade 2, estrogen receptor positive at 90%, progesterone receptor positive at 30%, and HER-2/german negative. MammaPrint Index = +0.417.        INTERVAL HISTORY:     Here for a follow-up visit.  She reports that she is tolerating Arimidex well. She denies hot  flashes, joint pains. She reports blurry vision and hair thinning which preceded start of Arimidex.   She  has no complaints except for right breast mass.      Her most recent DEXA was completed on 09/29/2022  and showed osteopenia. These results were discussed with patient. Last Mammogram was completed on 01/18/2024 and showed stable right breast mass.           Review of Systems:   Review of Systems:    ROS:  A 14-point review of system was completed and was negative except for what is noted in HPI.           Allergies and Intolerances:       Allergies:         Benadryl: Drug, Other, Active         corticosteroids: Drug Category, Other, Active         Latex: Latex, Rash, Active         Tape - Adhesive, Bandaids, Paper: Environment, Rash, Active     Outpatient Medication Profile:          Current Outpatient Medications:     acetaminophen (Tylenol) 325 mg tablet, 2 tablet EVERY 4 HOURS (route: oral), Disp: , Rfl:     anastrozole (Arimidex) 1 mg tablet, Take 1 tablet (1 mg total) by mouth once daily., Disp: 90 tablet, Rfl: 1    ascorbic acid (Vitamin C) 1,000 mg tablet, 1 tablet DAILY (route: oral), Disp: , Rfl:     ascorbic acid, vitamin C, 500 mg capsule, Take 1 capsule by mouth once daily., Disp: , Rfl:     aspirin 81 mg EC tablet, Take 1 tablet (81 mg) by mouth once daily., Disp: , Rfl:     atorvastatin (Lipitor) 10 mg tablet, Take 1 tablet (10 mg) by mouth once daily at bedtime., Disp: 30 tablet, Rfl: 3    cholecalciferol (Vitamin D-3) 50 MCG (2000 UT) tablet, Take 1 tablet (2,000 Units) by mouth once daily., Disp: , Rfl:     famotidine (Pepcid) 20 mg tablet, 1 tablet DAILY (route: oral), Disp: , Rfl:     fluticasone (Flonase Allergy Relief) 50 mcg/actuation nasal spray, 2 spray DAILY (route: nasal), Disp: , Rfl:     Lactobacillus acidophilus capsule, 1 capsule DAILY (route: oral), Disp: , Rfl:     levETIRAcetam (Keppra) 500 mg tablet, 2 tablet DAILY (route: oral), Disp: 180 tablet, Rfl: 0    levETIRAcetam XR  (Keppra XR) 500 mg 24 hr tablet, TAKE 2 TABLETS(1000 MG) BY MOUTH EVERY DAY, Disp: 180 tablet, Rfl: 0    levETIRAcetam XR (Keppra XR) 500 mg 24 hr tablet, TAKE 2 TABLETS(1000 MG) BY MOUTH EVERY DAY, Disp: 180 tablet, Rfl: 0    metoprolol succinate XL (Toprol-XL) 100 mg 24 hr tablet, Take 1 tablet (100 mg) by mouth once daily., Disp: 90 tablet, Rfl: 1    midodrine (Proamatine) 10 mg tablet, Per instructions AS NEEDED (route: oral), Disp: , Rfl:     mirtazapine (Remeron) 15 mg tablet, .5 tablet BEDTIME (route: oral), Disp: , Rfl:     naloxone (Narcan) 4 mg/0.1 mL nasal spray, Administer into affected nostril(s)., Disp: , Rfl:     RABEprazole (Aciphex) EC tablet, Take 1 tablet (20 mg) by mouth once daily., Disp: , Rfl:     traMADol (Ultram) 50 mg tablet, Take 1-2 tablets ( mg) by mouth 3 times a day as needed for severe pain (7 - 10)., Disp: 180 tablet, Rfl: 0    valsartan-hydrochlorothiazide (Diovan-HCT) 160-12.5 mg tablet, Take 1 tablet by mouth once daily., Disp: 30 tablet, Rfl: 3    valsartan-hydrochlorothiazide (Diovan-HCT) 320-25 mg tablet, TAKE 1 TABLET BY MOUTH DAILY, Disp: 90 tablet, Rfl: 3    valsartan-hydrochlorothiazide (Diovan-HCT) 80-12.5 mg tablet, Per instructions DAILY (route: oral), Disp: , Rfl:         Medical History:         GERD (gastroesophageal reflux disease): ICD-10: K21.9, Status:  Active         Complex partial seizures: ICD-10: G40.209, Status: Active         Mild hypertrophic obstructive cardiomyopathy: ICD-10: I42.1,  Status: Active         Pneumonia: ICD-10: J18.9, Status: Active         Hypertension: ICD-10: I10, Status: Active       Surg History:         History of hernia repair: ICD-10: Z98.890, Status: Active         History of hysterectomy: ICD-10: Z90.710, Status: Active         History of appendectomy: ICD-10: Z90.49, Status: Active         Breast cancer, stage 1, estrogen receptor positive: ICD-10:  C50.919, Status: Active, Description: Breast cancer, stage 1, estrogen  receptor positive         Breast cancer, right: ICD-10: C50.911, Status: Active         Breast cancer, stage 1, estrogen receptor positive: ICD-10:  C50.919, Status: Active     Family History: No Family History items are recorded  in the problem list.       Social History:   Social Substance History:  ·  Smoking Status former smoker (1)   ·  Additional History           Family History:  Father had lung cancer, , sister had kidney cancer ,  and brother prostate cancer, .     Breast Cancer Risk Factors:  , Had her menarche at age 11 years, 1st birth at age 23 years, menopause with hyterectomy, extremely dense breast  (1)           Performance:   ECOG Performance Status: 1- Restricted from physically  stenuous work         Vitals and Measurements:   Visit Vitals  /83 (BP Location: Left arm, Patient Position: Sitting, BP Cuff Size: Adult)   Pulse 71   Temp 36.3 °C (97.3 °F) (Temporal)   Wt 55.7 kg (122 lb 11 oz)   BMI 23.87 kg/m²   Smoking Status Former   BSA 1.54 m²      Physical Exam:      Constitutional: Well developed, awake/alert/oriented  x3, no distress, alert and cooperative   Eyes: PERRL, EOMI, clear sclera   ENMT: mucous membranes moist, no apparent injury,  no lesions seen   Head/Neck: Neck supple, no apparent injury, thyroid  without mass or tenderness, No JVD, trachea midline, no bruits   Respiratory/Thorax: Patent airways, CTAB, normal  breath sounds with good chest expansion, thorax symmetric   Cardiovascular: Regular, rate and rhythm, no murmurs,  2+ equal pulses of the extremities, normal S 1and S 2   Gastrointestinal: Nondistended, soft, non-tender,  no rebound tenderness or guarding, no masses palpable, no organomegaly, +BS, no bruits   Musculoskeletal: ROM intact, no joint swelling, normal  strength   Extremities: normal extremities, no cyanosis edema,  contusions or wounds, no clubbing   Neurological: alert and oriented x3, intact senses,  motor, response and  reflexes, normal strength   Breast: 3 x 3 cm right breast mass with no palpable  right axillary LNs. No palpable mass in the left breast. No palpable axillary or supraclavicular lymphadenopathies bilaterally. No swellling of either upper extremity which had free range of motion.      Lymphatic: No significant lymphadenopathy   Psychological: Appropriate mood and behavior   Skin: Warm and dry, no lesions, no rashes         Lab Results:     ·  Results       Lab Results   Component Value Date    WBC 7.1 10/27/2023    HGB 13.6 10/27/2023    HCT 42.8 10/27/2023    MCV 97 10/27/2023     10/27/2023        Lab Results   Component Value Date    NEUTROABS 14.54 (H) 12/13/2021          Chemistry    Lab Results   Component Value Date/Time     10/27/2023 1133    K 4.1 10/27/2023 1133     10/27/2023 1133    CO2 28 10/27/2023 1133    BUN 16 10/27/2023 1133    CREATININE 0.73 10/27/2023 1133    Lab Results   Component Value Date/Time    CALCIUM 10.5 10/27/2023 1133    ALKPHOS 74 10/27/2023 1133    AST 20 10/27/2023 1133    ALT 16 10/27/2023 1133    BILITOT 0.8 10/27/2023 1133            Radiology Result:     BI US breast limited right  Status: Final result     PACS Images     Show images for BI US breast limited right  Signed by    Signed Time Phone Pager   Brenton Oden MD 1/19/2024 09:49 [phone]     Exam Information    Status Exam Begun Exam Ended   Final 1/18/2024 11:30 1/18/2024 11:44     Study Result    Narrative & Impression   Interpreted By:  rBenton Oden,   STUDY:  BI MAMMO BILATERAL DIAGNOSTIC TOMOSYNTHESIS;  1/18/2024 11:25 am      ACCESSION NUMBER(S):  [id number]      ORDERING CLINICIAN:  ARIADNE ALCANTAR      INDICATION:  Patient with a biopsy-proven malignancy in the right breast who is  undergoing endocrine therapy only. Presents for imaging follow-up.      COMPARISON:  07/18/2023, 04/18/2023, 01/17/2023      FINDINGS:  2D and tomosynthesis images were reviewed at 1 mm slice thickness.       Density:  The breast tissue is heterogeneously dense, which may  obscure small masses.      The patient's biopsy-proven malignancy is seen in the upper-outer  quadrant of the right breast at a posterior depth with an associated  biopsy clip. Mammographically it appears stable. The group of  microcalcifications for which follow-up was recommended underwent  evaluation with magnification views. They are identified along the  lower central position at a middle depth. These continue to evolve  and now have a benign appearance. These also have been stable for  over 2 years and therefore require no further evaluation.      The patient went on for ultrasound evaluation of her known malignancy.      Sonographic images were obtained of the right breast at the 10  o'clock position 10 cm from the nipple. The previously described mass  is noted again. There is an adjacent biopsy clip. It is unchanged in  its sonographic characteristics. Today measures 1.1 x 0.7 x 0.8 cm.  When accounting for differences in measurement and technique there is  no significant interval change.      IMPRESSION:  Stable biopsy-proven malignancy at the 10 o'clock position of the  right breast. Imaging follow-up should be determined on a clinical  basis.      Calcifications for which follow-up was recommended now demonstrate  more benign features and have been stable for over 2 years. These are  now considered benign. No further imaging follow-up is needed.      No mammographic evidence of malignancy in the left breast.      BI-RADS CATEGORY:      BI-RADS CATEGORY:  6 Known Biopsy-Proven Malignancy.  Recommendation:  Follow previous recommendations.  Recommended Date:  Immediate.  Laterality:  Right.      For any future breast imaging appointments, please call [phone] ([phone]).           Assessment and Plan:      Assessment and Plan:   Assessment:    88-year-old postmenopausal AA female with right-sided invasive ductal carcinoma, clinical stage  IB (T2 N0 M0). The patient's breast cancer was diagnosed on August 5, 2022, and is grade 2, estrogen receptor positive at 90%, progesterone receptor positive at 30%, and HER-2/german negative. MammaPrint Index = +0.417.     Currently on neoadjuvant Arimidex and tolerating it well with no evidence of progression. We will obtain breast imaging to assess treatment response.  Plan:     Continue primary hormonal therapy  with Arimidex 1 mg by mouth daily. In the event that mass increases in size swith hormonal therapies and d/w surgery   Vitamin D and calcium supplements   Will obtain breast imaging in January 2025 (Once a year in discussion with patient)   Follow-up with Lara Becerril CNP  in 3 months

## 2024-04-15 RX ORDER — ANASTROZOLE 1 MG/1
1 TABLET ORAL DAILY
Qty: 90 TABLET | Refills: 1 | Status: SHIPPED | OUTPATIENT
Start: 2024-04-15

## 2024-04-20 DIAGNOSIS — I10 BENIGN ESSENTIAL HYPERTENSION: ICD-10-CM

## 2024-04-22 DIAGNOSIS — M54.16 LUMBAR RADICULOPATHY: ICD-10-CM

## 2024-04-22 RX ORDER — TRAMADOL HYDROCHLORIDE 50 MG/1
50-100 TABLET ORAL 3 TIMES DAILY PRN
Qty: 180 TABLET | Refills: 0 | Status: SHIPPED | OUTPATIENT
Start: 2024-04-22 | End: 2024-05-21 | Stop reason: SDUPTHER

## 2024-04-23 ENCOUNTER — TELEMEDICINE (OUTPATIENT)
Dept: PAIN MEDICINE | Facility: CLINIC | Age: 89
End: 2024-04-23
Payer: MEDICARE

## 2024-04-23 DIAGNOSIS — M47.816 OSTEOARTHRITIS OF LUMBAR SPINE, UNSPECIFIED SPINAL OSTEOARTHRITIS COMPLICATION STATUS: ICD-10-CM

## 2024-04-23 DIAGNOSIS — Z79.899 ENCOUNTER FOR LONG-TERM (CURRENT) USE OF HIGH-RISK MEDICATION: Primary | ICD-10-CM

## 2024-04-23 DIAGNOSIS — M54.16 LUMBAR RADICULOPATHY: ICD-10-CM

## 2024-04-23 PROCEDURE — 99213 OFFICE O/P EST LOW 20 MIN: CPT | Performed by: NURSE PRACTITIONER

## 2024-04-23 PROCEDURE — 1160F RVW MEDS BY RX/DR IN RCRD: CPT | Performed by: NURSE PRACTITIONER

## 2024-04-23 PROCEDURE — 1159F MED LIST DOCD IN RCRD: CPT | Performed by: NURSE PRACTITIONER

## 2024-04-23 PROCEDURE — 1036F TOBACCO NON-USER: CPT | Performed by: NURSE PRACTITIONER

## 2024-04-23 NOTE — PROGRESS NOTES
Chief Complaint/HPI:  Pt. Presents for telehealth visit for medication refill. She ha chronic low back pain. On treatment for breast cancer.   She is maintained on tramadol prn. This regimen remains effective and she denies adverse side effects.     OARRS:  Miriam Nixon, APRN-CNP on 4/23/2024  7:08 AM  I have personally reviewed the OARRS report for Ann Marie Murphy. I have considered the risks of abuse, dependence, addiction and diversion    Is the patient prescribed a combination of a benzodiazepine and opioid?  No    Last Urine Drug Screen / ordered today: Yes  Recent Results (from the past 8760 hour(s))   Tramadol Confirmation    Collection Time: 10/25/23 11:43 AM   Result Value Ref Range    Tramadol >1,000 (H) <50 ng/mL    O-Desmethyltramadol >1,000 (H) <50 ng/mL   Drug Screen, Urine With Reflex to Confirmation    Collection Time: 10/25/23 11:43 AM   Result Value Ref Range    Amphetamine Screen, Urine Presumptive Negative Presumptive Negative    Barbiturate Screen, Urine Presumptive Negative Presumptive Negative    Benzodiazepines Screen, Urine Presumptive Negative Presumptive Negative    Cannabinoid Screen, Urine Presumptive Negative Presumptive Negative    Cocaine Metabolite Screen, Urine Presumptive Negative Presumptive Negative    Fentanyl Screen, Urine Presumptive Negative Presumptive Negative    Opiate Screen, Urine Presumptive Negative Presumptive Negative    Oxycodone Screen, Urine Presumptive Negative Presumptive Negative    PCP Screen, Urine Presumptive Negative Presumptive Negative     Results are as expected.     Controlled Substance Agreement:  Date of the Last Agreement: 1/24/24  Reviewed Controlled Substance Agreement including but not limited to the benefits, risks, and alternatives to treatment with a Controlled Substance medication(s).    ROS otherwise negative aside from what was mentioned above in HPI.      Patient Active Problem List   Diagnosis    Hypertrophic cardiomyopathy (Multi)     Hypertension    Hypercalcemia    Excessive cerumen in ear canal, right    Cerumen impaction    Convulsions (Multi)    Combined hyperlipidemia    Breast mass, right    Breast cancer, right (Multi)    Atherosclerosis of aorta (CMS-HCC)    Acute UTI    Abnormal finding on breast imaging    Abnormal urinalysis    Anemia    Osteoporosis    Osteoarthritis of lumbar spine    LVH (left ventricular hypertrophy)    Lumbar radiculopathy    Pain of finger of left hand    Right hand paresthesia    Urinary retention    Dementia in other diseases classified elsewhere, unspecified severity, without behavioral disturbance, psychotic disturbance, mood disturbance, and anxiety (Multi)    Acute respiratory failure with hypoxia (Multi)    Cushing's syndrome, unspecified (Multi)    COVID-19    History of falling    Long term (current) use of aspirin    Personal history of nicotine dependence    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)    Anxiety disorder, unspecified    Diverticulosis of intestine, part unspecified, without perforation or abscess without bleeding    Gastro-esophageal reflux disease without esophagitis    Heart failure, unspecified (Multi)    History of CVA (cerebrovascular accident) without residual deficits    History of rheumatic fever    Localization-related (focal) (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, not intractable, without status epilepticus (Multi)    Obstructive and reflux uropathy, unspecified    Pneumonia due to coronavirus disease 2019    Hyperlipidemia, unspecified    Essential (primary) hypertension     Past Medical History:   Diagnosis Date    Anxiety disorder due to known physiological condition     Anxiety disorder due to a general medical condition    Cardiomyopathy, unspecified (Multi)     Cardiomyopathy    Diverticulosis of intestine, part unspecified, without perforation or abscess without bleeding     Diverticulosis    Encounter for screening mammogram for malignant  neoplasm of breast     Visit for screening mammogram    Localization-related (focal) (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, not intractable, without status epilepticus (Multi)     Complex partial seizure    Other conditions influencing health status     Ovarian Torsion On The Left    Other conditions influencing health status     Stroke syndrome    Other specified noninflammatory disorders of uterus     Fibrosis of uterus    Personal history of other diseases of the circulatory system     History of hypertension    Personal history of other diseases of the circulatory system     History of hypertrophic cardiomyopathy    Personal history of other diseases of the digestive system     History of cholelithiasis    Personal history of other diseases of the digestive system     History of hemorrhoids    Personal history of other diseases of the digestive system     History of hiatal hernia    Personal history of other diseases of the digestive system     History of esophageal reflux    Personal history of other diseases of the female genital tract 01/14/2020    History of breast pain    Personal history of other endocrine, nutritional and metabolic disease     History of hypercholesterolemia    Personal history of other endocrine, nutritional and metabolic disease 03/24/2016    History of Cushing's syndrome    Personal history of transient ischemic attack (TIA), and cerebral infarction without residual deficits     History of transient cerebral ischemia    Unspecified convulsions (Multi)     Generalized convulsive seizure     Past Surgical History:   Procedure Laterality Date    APPENDECTOMY  01/02/2014    Appendectomy    COLONOSCOPY  01/02/2014    Complete Colonoscopy    EXPLORATORY LAPAROTOMY  01/02/2014    Exploratory Laparotomy    HERNIA REPAIR  01/02/2014    Hernia Repair    HYSTERECTOMY  01/02/2014    Hysterectomy    OTHER SURGICAL HISTORY  01/02/2014    Upper  "Gastrointestinal Endoscopy (Therapeutic)    OTHER SURGICAL HISTORY  2014    Surgery    UMBILICAL HERNIA REPAIR  2014    Umbilical Hernia Repair     Family History   Problem Relation Name Age of Onset    Lung cancer Father      Other (Carcinoma of the pancreas) Sister      Kidney cancer Sister      Arrhythmia Brother          ALl 3 Brothers     Other (Parkinson disease) Brother      Prostate cancer Brother       Social History     Tobacco Use    Smoking status: Former     Types: Cigarettes    Smokeless tobacco: Never   Substance Use Topics    Alcohol use: Not Currently    Drug use: Not Currently         ALLERGIES  Allergies   Allergen Reactions    Adhesive Tape-Silicones Unknown    Cortisone Swelling    Diphenhydramine Hallucinations and Other     \"goes out of head\"    Latex Unknown    Morphine Unknown         MEDICATIONS  Current Outpatient Medications   Medication Sig Dispense Refill    acetaminophen (Tylenol) 325 mg tablet 2 tablet EVERY 4 HOURS (route: oral)      anastrozole (Arimidex) 1 mg tablet Take 1 tablet (1 mg total) by mouth once daily. 90 tablet 1    ascorbic acid (Vitamin C) 1,000 mg tablet 1 tablet DAILY (route: oral)      ascorbic acid, vitamin C, 500 mg capsule Take 1 capsule by mouth once daily.      aspirin 81 mg EC tablet Take 1 tablet (81 mg) by mouth once daily.      atorvastatin (Lipitor) 10 mg tablet Take 1 tablet (10 mg) by mouth once daily at bedtime. 30 tablet 3    cholecalciferol (Vitamin D-3) 50 MCG (2000 UT) tablet Take 1 tablet (2,000 Units) by mouth once daily.      famotidine (Pepcid) 20 mg tablet 1 tablet DAILY (route: oral)      fluticasone (Flonase Allergy Relief) 50 mcg/actuation nasal spray 2 spray DAILY (route: nasal)      Lactobacillus acidophilus capsule 1 capsule DAILY (route: oral)      levETIRAcetam (Keppra) 500 mg tablet 2 tablet DAILY (route: oral) 180 tablet 0    levETIRAcetam XR (Keppra XR) 500 mg 24 hr tablet TAKE 2 TABLETS(1000 MG) BY MOUTH EVERY "  tablet 0    levETIRAcetam XR (Keppra XR) 500 mg 24 hr tablet TAKE 2 TABLETS(1000 MG) BY MOUTH EVERY  tablet 0    metoprolol succinate XL (Toprol-XL) 100 mg 24 hr tablet Take 1 tablet (100 mg) by mouth once daily. 90 tablet 1    midodrine (Proamatine) 10 mg tablet Per instructions AS NEEDED (route: oral)      mirtazapine (Remeron) 15 mg tablet .5 tablet BEDTIME (route: oral)      naloxone (Narcan) 4 mg/0.1 mL nasal spray Administer into affected nostril(s).      RABEprazole (Aciphex) EC tablet Take 1 tablet (20 mg) by mouth once daily.      traMADol (Ultram) 50 mg tablet Take 1-2 tablets ( mg) by mouth 3 times a day as needed for severe pain (7 - 10). 180 tablet 0    valsartan-hydrochlorothiazide (Diovan-HCT) 160-12.5 mg tablet Take 1 tablet by mouth once daily. 30 tablet 3    valsartan-hydrochlorothiazide (Diovan-HCT) 320-25 mg tablet TAKE 1 TABLET BY MOUTH DAILY 90 tablet 3    valsartan-hydrochlorothiazide (Diovan-HCT) 80-12.5 mg tablet Per instructions DAILY (route: oral)       No current facility-administered medications for this visit.     PHYSICAL EXAM  Due to the nature of telehealth the physical exam is limited.   Gen: Alert, NAD  Respiratory:  Normal effort, no SOB  Psych: appropriate, cooperative    ASSESSMENT/PLAN  Problem List Items Addressed This Visit    None    Encounter Diagnoses   Name Primary?    Encounter for long-term (current) use of high-risk medication Yes    Lumbar radiculopathy     Osteoarthritis of lumbar spine, unspecified spinal osteoarthritis complication status      Will continue plan of care as established.  -Continue tramadol as currently prescribed  -UDS and CSA are up to date  -RTC, in person, in 3 mos.  -May do VV every other visit    The patient was counseled on the appropriate use of the opioid medication, its potential dangers and the responsibilities that go along with being prescribed opioid medications including safe storage, use and disposal of the  medication. Patient was also counseled on the adverse effects of long-term opioid use including hyperalgesia, tolerance, decreased immune function, and changes to the body's natural hormones.  The patient also understands the potential risks for respiratory depression especially if the medication is combined with other central nervous system depressants such as benzodiazepines or alcohol.    Miriam Nixon, APRN-CNP

## 2024-04-24 DIAGNOSIS — Z00.00 HEALTH CARE MAINTENANCE: ICD-10-CM

## 2024-04-24 RX ORDER — LEVETIRACETAM 500 MG/1
TABLET ORAL
Qty: 180 TABLET | Refills: 0 | Status: SHIPPED | OUTPATIENT
Start: 2024-04-24

## 2024-04-25 DIAGNOSIS — I10 BENIGN ESSENTIAL HYPERTENSION: ICD-10-CM

## 2024-04-25 RX ORDER — METOPROLOL SUCCINATE 100 MG/1
100 TABLET, EXTENDED RELEASE ORAL DAILY
Qty: 90 TABLET | Refills: 1 | Status: SHIPPED | OUTPATIENT
Start: 2024-04-25 | End: 2024-04-27

## 2024-04-27 RX ORDER — METOPROLOL SUCCINATE 100 MG/1
100 TABLET, EXTENDED RELEASE ORAL DAILY
Qty: 90 TABLET | Refills: 1 | Status: SHIPPED | OUTPATIENT
Start: 2024-04-27

## 2024-05-21 DIAGNOSIS — M54.16 LUMBAR RADICULOPATHY: ICD-10-CM

## 2024-05-21 RX ORDER — TRAMADOL HYDROCHLORIDE 50 MG/1
50-100 TABLET ORAL 3 TIMES DAILY PRN
Qty: 180 TABLET | Refills: 0 | Status: SHIPPED | OUTPATIENT
Start: 2024-05-21 | End: 2024-06-20

## 2024-06-20 DIAGNOSIS — M54.16 LUMBAR RADICULOPATHY: ICD-10-CM

## 2024-06-20 RX ORDER — TRAMADOL HYDROCHLORIDE 50 MG/1
50-100 TABLET ORAL 3 TIMES DAILY PRN
Qty: 180 TABLET | Refills: 0 | Status: SHIPPED | OUTPATIENT
Start: 2024-06-20 | End: 2024-07-20

## 2024-07-18 DIAGNOSIS — M54.16 LUMBAR RADICULOPATHY: ICD-10-CM

## 2024-07-18 RX ORDER — TRAMADOL HYDROCHLORIDE 50 MG/1
50-100 TABLET ORAL 3 TIMES DAILY PRN
Qty: 180 TABLET | Refills: 0 | Status: SHIPPED | OUTPATIENT
Start: 2024-07-18 | End: 2024-08-17

## 2024-07-26 ENCOUNTER — OFFICE VISIT (OUTPATIENT)
Dept: HEMATOLOGY/ONCOLOGY | Facility: CLINIC | Age: 89
End: 2024-07-26
Payer: MEDICARE

## 2024-07-26 VITALS
SYSTOLIC BLOOD PRESSURE: 131 MMHG | HEART RATE: 71 BPM | DIASTOLIC BLOOD PRESSURE: 68 MMHG | BODY MASS INDEX: 23.57 KG/M2 | WEIGHT: 121.14 LBS

## 2024-07-26 DIAGNOSIS — C50.411 MALIGNANT NEOPLASM OF UPPER-OUTER QUADRANT OF RIGHT BREAST IN FEMALE, ESTROGEN RECEPTOR POSITIVE (MULTI): ICD-10-CM

## 2024-07-26 DIAGNOSIS — Z17.0 MALIGNANT NEOPLASM OF UPPER-OUTER QUADRANT OF RIGHT BREAST IN FEMALE, ESTROGEN RECEPTOR POSITIVE (MULTI): ICD-10-CM

## 2024-07-26 DIAGNOSIS — Z17.0 MALIGNANT NEOPLASM OF OVERLAPPING SITES OF RIGHT BREAST IN FEMALE, ESTROGEN RECEPTOR POSITIVE (MULTI): ICD-10-CM

## 2024-07-26 DIAGNOSIS — C50.811 MALIGNANT NEOPLASM OF OVERLAPPING SITES OF RIGHT BREAST IN FEMALE, ESTROGEN RECEPTOR POSITIVE (MULTI): ICD-10-CM

## 2024-07-26 DIAGNOSIS — Z79.811 ENCOUNTER FOR MONITORING AROMATASE INHIBITOR THERAPY: Primary | ICD-10-CM

## 2024-07-26 DIAGNOSIS — Z51.81 ENCOUNTER FOR MONITORING AROMATASE INHIBITOR THERAPY: Primary | ICD-10-CM

## 2024-07-26 PROCEDURE — 99214 OFFICE O/P EST MOD 30 MIN: CPT | Performed by: NURSE PRACTITIONER

## 2024-07-26 PROCEDURE — 1126F AMNT PAIN NOTED NONE PRSNT: CPT | Performed by: NURSE PRACTITIONER

## 2024-07-26 PROCEDURE — 3078F DIAST BP <80 MM HG: CPT | Performed by: NURSE PRACTITIONER

## 2024-07-26 PROCEDURE — 1159F MED LIST DOCD IN RCRD: CPT | Performed by: NURSE PRACTITIONER

## 2024-07-26 PROCEDURE — 3075F SYST BP GE 130 - 139MM HG: CPT | Performed by: NURSE PRACTITIONER

## 2024-07-26 RX ORDER — ANASTROZOLE 1 MG/1
1 TABLET ORAL DAILY
Qty: 90 TABLET | Refills: 3 | Status: SHIPPED | OUTPATIENT
Start: 2024-07-26

## 2024-07-26 ASSESSMENT — PAIN SCALES - GENERAL: PAINLEVEL: 0-NO PAIN

## 2024-07-26 NOTE — PATIENT INSTRUCTIONS
Your exam shows that the mass is stable.  Continue anastrozole 1mg daily.  Per Dr. Morales's recommendation, you will have a mammogram once a year.  I will continue to see you every 3 months.  Please schedule your next visit for late October.  Enjoy the rest of the summer!

## 2024-07-26 NOTE — PROGRESS NOTES
Oncology Follow-Up    Ann Marie Murphy  [id number]       AJCC Edition: 8th (AJCC), Diagnosis Date: 05-Aug-2022, IB, cT2 cN0 cM0 G2           87-year-old postmenopausal AA female with right-sided invasive ductal carcinoma, clinical stage IB (T2 N0 M0). The patient's breast cancer was diagnosed on August 5, 2022, and is grade 2, estrogen receptor positive at 90%, progesterone receptor positive at 30%, and HER-2/german negative. MammaPrint Index = +0.417.     CURRENT THERAPY: Neoadjuvant Arimidex 1 mg by mouth daily      Details of her history are as follows:         Patient palpated a right breast lump x 3 months   08/01/2022: Patient underwent bilateral diagnostic mammogram with right breast US. This revealed extremely dense breast with a spiculated mass in the right breast. US revealed a mass at 10:00, 10cm from the nipple measuring 2.0 x 1.2 x 1.3cm.   08/05/2022: Patient underwent an US-guided core biopsy of the right breast at 10:00, 10 cm from the nipple. Pathology was consistent with invasive ductal carcinoma with receptors as above   08/30/2022: Patient was started on neoadjuvant Arimidex 1mg by mouth daily      Subjective    Ms. Murphy presents accompanied by her daughter for her follow up on her T2N0 right IDC that is being treated with neoadjuvant anastrozole. Ms. Murphy reports no new health issues. She continues to have pain in her stomach though this has been on-going for years and testing is negative. She is tolerating anastrozole well. Ms. Murphy denies any unusual headaches, balance issues, depression, cough, shortness of breath, problems swallowing, changes in chest/breast area, bone or muscle pain, vaginal bleeding, rectal bleeding, blood in the urine, vaginal dryness, swelling arms or legs, new or unusual skin moles or lesions.         Objective      Vitals:    07/26/24 1103   BP: 131/68   Pulse: 71        Constitutional: Well developed, alert/oriented x3, no distress, cooperative   Eyes: clear  sclera   ENMT: mucous membranes moist, no apparent lesions   Head/Neck: Neck supple, no bruits   Respiratory/Thorax: Patent airways, normal breath sounds with good chest expansion   Cardiovascular: Regular rate and rhythm, no murmurs, 2+ equal pulses of the extremities,   Gastrointestinal: Nondistended, soft, non-tender, no masses palpable, no organomegaly   Musculoskeletal: ROM intact, no joint swelling, normal strength   Extremities: normal extremities, no edema, cyanosis, contusions or wounds   Neurological: alert and oriented x3,  normal strength   Breast:    Lymphatic: No significant lymphadenopathy   Psychological: Appropriate mood and behavior   Skin: Warm and dry, no lesions, no rashes      Physical Exam  Chest:          Comments: Right breast with a course-textured thickening in the UOQ that measures 2cm x 1.8cm. Appears to be stable from last exam. Left breast without masses, nodules, skin changes, discharge. Both breasts mostly fat replaced         Lab Results   Component Value Date    WBC 7.1 10/27/2023    HGB 13.6 10/27/2023    HCT 42.8 10/27/2023    MCV 97 10/27/2023     10/27/2023       Chemistry    Lab Results   Component Value Date/Time     10/27/2023 1133    K 4.1 10/27/2023 1133     10/27/2023 1133    CO2 28 10/27/2023 1133    BUN 16 10/27/2023 1133    CREATININE 0.73 10/27/2023 1133    Lab Results   Component Value Date/Time    CALCIUM 10.5 10/27/2023 1133    ALKPHOS 74 10/27/2023 1133    AST 20 10/27/2023 1133    ALT 16 10/27/2023 1133    BILITOT 0.8 10/27/2023 1133         Imagin2024 10:46 2024 11:25     Study Result    Narrative & Impression   Interpreted By:  Brenton Oden,   STUDY:  BI MAMMO BILATERAL DIAGNOSTIC TOMOSYNTHESIS;  2024 11:25 am      ACCESSION NUMBER(S):  [id number]      ORDERING CLINICIAN:  ARIADNE ALCANTAR      INDICATION:  Patient with a biopsy-proven malignancy in the right breast who is  undergoing endocrine therapy only. Presents for  imaging follow-up.      COMPARISON:  07/18/2023, 04/18/2023, 01/17/2023      FINDINGS:  2D and tomosynthesis images were reviewed at 1 mm slice thickness.      Density:  The breast tissue is heterogeneously dense, which may  obscure small masses.      The patient's biopsy-proven malignancy is seen in the upper-outer  quadrant of the right breast at a posterior depth with an associated  biopsy clip. Mammographically it appears stable. The group of  microcalcifications for which follow-up was recommended underwent  evaluation with magnification views. They are identified along the  lower central position at a middle depth. These continue to evolve  and now have a benign appearance. These also have been stable for  over 2 years and therefore require no further evaluation.      The patient went on for ultrasound evaluation of her known malignancy.      Sonographic images were obtained of the right breast at the 10  o'clock position 10 cm from the nipple. The previously described mass  is noted again. There is an adjacent biopsy clip. It is unchanged in  its sonographic characteristics. Today measures 1.1 x 0.7 x 0.8 cm.  When accounting for differences in measurement and technique there is  no significant interval change.      IMPRESSION:  Stable biopsy-proven malignancy at the 10 o'clock position of the  right breast. Imaging follow-up should be determined on a clinical  basis.      Calcifications for which follow-up was recommended now demonstrate  more benign features and have been stable for over 2 years. These are  now considered benign. No further imaging follow-up is needed.      No mammographic evidence of malignancy in the left breast.      BI-RADS CATEGORY:      BI-RADS CATEGORY:  6 Known Biopsy-Proven Malignancy.  Recommendation:  Follow previous recommendations.  Recommended Date:  Immediate.  Laterality:  Right.       Status Exam Begun Exam Ended   Final 9/29/2022 12:20 9/29/2022 12:24     Study  Result    Narrative & Impression   Name: STEPHENIE RUIZ   Patient ID: [id number] YOB: 1935 Height: 61.0 in.     Gender:     Female   Exam Date:  09/29/2022 Weight: 125.0 lbs.   Indications: cancer, Hysterectomy, Post Menopausal, Screening   Fractures:   clavicle, tibia                                    Treatments:                                                        LEFT FEMUR - TOTAL  The bone mineral density : 0.971 g/cm2  T-score : -0.3    % of young normal mean :96%  Z-score : 1.1    % of age matched mean : 116%   % change vs. Previous : N/A    % change vs. Baseline : baseline     LEFT FEMUR - NECK  The bone mineral density : 0.863 g/cm2  T-score : -1.3    % of young normal mean: 83%  Z-score : 0.3    % of age matched mean : 104%   % change vs. Previous : N/A    % change vs. Baseline : baseline     SPINE L1-L3  The bone mineral density is 1.261 g/cm2  T-score : 0.8   % of young normal mean is 108%  Z-score : 2.0    % of age matched mean is 124%   % change vs. Previous : -    % change vs. Baseline : baseline         World Health Organization (WHO) criteria for post-menopausal,    Women:    Normal:       T-score at or above -1 SD         Osteopenia:   T-score between -1 and -2.5 SD    Osteoporosis: T-score at or below -2.5 SD         10-Year Fracture Risk:  Major Osteoporotic Fracture 7.2%   Hip Fracture                1.9%   Reported Risk Factors       None      Interpretation:  According to World Health Organization criteria, classification is   low bone mass.     Assessment/Plan    Ms. Ruiz is an 89 yo woman with a hx of T2N0 right IDC G-2 diagnossed August 2022. She is currently on laurie-adjuvant anastrozole which continues to keep the mass stable on imaging and exam. There is no evidence of recurrent disease on today's exam.     Plan:  Exam shows stability of the mass.  Continue anastrozole 1mg daily.  Per Dr. Morales's note, we will go to yearly mammograms.  We will continue every  3 months surveillance.   Continue to be as active as possible.  Ms. Murphy or her daughter will call if they have any questions or concerns.  I will see her back in late October.  All of Ms. Murphy's questions/concerns were addressed.  Over 15 minutes of time was spent with this patient with >50% of the time with education, counseling, and coordination of care.       Diagnoses and all orders for this visit:  Encounter for monitoring aromatase inhibitor therapy  Malignant neoplasm of upper-outer quadrant of right breast in female, estrogen receptor positive (Multi)  -     Clinic Appointment Request Follow Up; ARIADNE ALCANTAR  -     Clinic Appointment Request Follow Up; ARIADNE ALCANTAR; Future  Malignant neoplasm of overlapping sites of right breast in female, estrogen receptor positive (Multi)  -     anastrozole (Arimidex) 1 mg tablet; Take 1 tablet (1 mg total) by mouth once daily.        Ariadne Alcantar, COOPER-CNP

## 2024-07-27 PROBLEM — Z79.811 ENCOUNTER FOR MONITORING AROMATASE INHIBITOR THERAPY: Status: ACTIVE | Noted: 2024-07-27

## 2024-07-27 PROBLEM — Z51.81 ENCOUNTER FOR MONITORING AROMATASE INHIBITOR THERAPY: Status: ACTIVE | Noted: 2024-07-27

## 2024-07-28 DIAGNOSIS — Z00.00 HEALTH CARE MAINTENANCE: ICD-10-CM

## 2024-08-03 RX ORDER — LEVETIRACETAM 500 MG/1
TABLET ORAL
Qty: 180 TABLET | Refills: 0 | Status: SHIPPED | OUTPATIENT
Start: 2024-08-03

## 2024-08-07 ENCOUNTER — TELEMEDICINE (OUTPATIENT)
Dept: PAIN MEDICINE | Facility: CLINIC | Age: 89
End: 2024-08-07
Payer: MEDICARE

## 2024-08-07 DIAGNOSIS — Z79.899 ENCOUNTER FOR LONG-TERM (CURRENT) USE OF HIGH-RISK MEDICATION: Primary | ICD-10-CM

## 2024-08-07 DIAGNOSIS — M47.816 OSTEOARTHRITIS OF LUMBAR SPINE, UNSPECIFIED SPINAL OSTEOARTHRITIS COMPLICATION STATUS: ICD-10-CM

## 2024-08-07 DIAGNOSIS — M54.16 LUMBAR RADICULOPATHY: ICD-10-CM

## 2024-08-07 PROCEDURE — 1159F MED LIST DOCD IN RCRD: CPT | Performed by: NURSE PRACTITIONER

## 2024-08-07 PROCEDURE — 99213 OFFICE O/P EST LOW 20 MIN: CPT | Performed by: NURSE PRACTITIONER

## 2024-08-07 PROCEDURE — 1036F TOBACCO NON-USER: CPT | Performed by: NURSE PRACTITIONER

## 2024-08-07 PROCEDURE — 1160F RVW MEDS BY RX/DR IN RCRD: CPT | Performed by: NURSE PRACTITIONER

## 2024-08-07 RX ORDER — TRAMADOL HYDROCHLORIDE 50 MG/1
50-100 TABLET ORAL 3 TIMES DAILY PRN
Qty: 180 TABLET | Refills: 0 | Status: SHIPPED | OUTPATIENT
Start: 2024-08-16 | End: 2024-09-15

## 2024-08-07 NOTE — PROGRESS NOTES
Chief Complaint/HPI:  Pt presents today for telehealth visit for medication refill. PMH includes pain due to lumbar radiculopathy. Today is a bad day for her. Uses coping mechanisms at home. No new issues.  Maintained on tramadol prn, which remains effective and she denies adverse side effects.    OARRS:  Miriam Nixon, APRN-CNP on 8/7/2024  4:04 PM  I have personally reviewed the OARRS report for Ann Marie Murphy. I have considered the risks of abuse, dependence, addiction and diversion    Is the patient prescribed a combination of a benzodiazepine and opioid?  No    Last Urine Drug Screen / ordered today: Yes  Recent Results (from the past 8760 hour(s))   Tramadol Confirmation    Collection Time: 10/25/23 11:43 AM   Result Value Ref Range    Tramadol >1,000 (H) <50 ng/mL    O-Desmethyltramadol >1,000 (H) <50 ng/mL   Drug Screen, Urine With Reflex to Confirmation    Collection Time: 10/25/23 11:43 AM   Result Value Ref Range    Amphetamine Screen, Urine Presumptive Negative Presumptive Negative    Barbiturate Screen, Urine Presumptive Negative Presumptive Negative    Benzodiazepines Screen, Urine Presumptive Negative Presumptive Negative    Cannabinoid Screen, Urine Presumptive Negative Presumptive Negative    Cocaine Metabolite Screen, Urine Presumptive Negative Presumptive Negative    Fentanyl Screen, Urine Presumptive Negative Presumptive Negative    Opiate Screen, Urine Presumptive Negative Presumptive Negative    Oxycodone Screen, Urine Presumptive Negative Presumptive Negative    PCP Screen, Urine Presumptive Negative Presumptive Negative     Results are as expected.     Controlled Substance Agreement:  Date of the Last Agreement: 1/2024  Reviewed Controlled Substance Agreement including but not limited to the benefits, risks, and alternatives to treatment with a Controlled Substance medication(s).      ROS otherwise negative aside from what was mentioned above in HPI.      Patient Active Problem List    Diagnosis    Hypertrophic cardiomyopathy (Multi)    Hypertension    Hypercalcemia    Excessive cerumen in ear canal, right    Cerumen impaction    Convulsions (Multi)    Combined hyperlipidemia    Breast mass, right    Breast cancer, right (Multi)    Atherosclerosis of aorta (CMS-HCC)    Acute UTI    Abnormal finding on breast imaging    Abnormal urinalysis    Anemia    Osteoporosis    Osteoarthritis of lumbar spine    LVH (left ventricular hypertrophy)    Lumbar radiculopathy    Pain of finger of left hand    Right hand paresthesia    Urinary retention    Dementia in other diseases classified elsewhere, unspecified severity, without behavioral disturbance, psychotic disturbance, mood disturbance, and anxiety (Multi)    Acute respiratory failure with hypoxia (Multi)    Cushing's syndrome, unspecified (Multi)    COVID-19    History of falling    Long term (current) use of aspirin    Personal history of nicotine dependence    Crush syndrome (CMS-HCC)    Anxiety disorder, unspecified    Diverticulosis of intestine, part unspecified, without perforation or abscess without bleeding    Gastro-esophageal reflux disease without esophagitis    Heart failure, unspecified (Multi)    History of CVA (cerebrovascular accident) without residual deficits    History of rheumatic fever    Localization-related (focal) (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, not intractable, without status epilepticus (Multi)    Obstructive and reflux uropathy, unspecified    Pneumonia due to coronavirus disease 2019    Hyperlipidemia, unspecified    Essential (primary) hypertension    Encounter for monitoring aromatase inhibitor therapy     Past Medical History:   Diagnosis Date    Anxiety disorder due to known physiological condition     Anxiety disorder due to a general medical condition    Cardiomyopathy, unspecified (Multi)     Cardiomyopathy    Diverticulosis of intestine, part unspecified, without  perforation or abscess without bleeding     Diverticulosis    Encounter for screening mammogram for malignant neoplasm of breast     Visit for screening mammogram    Localization-related (focal) (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, not intractable, without status epilepticus (Multi)     Complex partial seizure    Other conditions influencing health status     Ovarian Torsion On The Left    Other conditions influencing health status     Stroke syndrome    Other specified noninflammatory disorders of uterus     Fibrosis of uterus    Personal history of other diseases of the circulatory system     History of hypertension    Personal history of other diseases of the circulatory system     History of hypertrophic cardiomyopathy    Personal history of other diseases of the digestive system     History of cholelithiasis    Personal history of other diseases of the digestive system     History of hemorrhoids    Personal history of other diseases of the digestive system     History of hiatal hernia    Personal history of other diseases of the digestive system     History of esophageal reflux    Personal history of other diseases of the female genital tract 01/14/2020    History of breast pain    Personal history of other endocrine, nutritional and metabolic disease     History of hypercholesterolemia    Personal history of other endocrine, nutritional and metabolic disease 03/24/2016    History of Cushing's syndrome    Personal history of transient ischemic attack (TIA), and cerebral infarction without residual deficits     History of transient cerebral ischemia    Unspecified convulsions (Multi)     Generalized convulsive seizure     Past Surgical History:   Procedure Laterality Date    APPENDECTOMY  01/02/2014    Appendectomy    COLONOSCOPY  01/02/2014    Complete Colonoscopy    EXPLORATORY LAPAROTOMY  01/02/2014    Exploratory Laparotomy    HERNIA REPAIR  01/02/2014     "Hernia Repair    HYSTERECTOMY  2014    Hysterectomy    OTHER SURGICAL HISTORY  2014    Upper Gastrointestinal Endoscopy (Therapeutic)    OTHER SURGICAL HISTORY  2014    Surgery    UMBILICAL HERNIA REPAIR  2014    Umbilical Hernia Repair     Family History   Problem Relation Name Age of Onset    Lung cancer Father      Other (Carcinoma of the pancreas) Sister      Kidney cancer Sister      Arrhythmia Brother          ALl 3 Brothers     Other (Parkinson disease) Brother      Prostate cancer Brother       Social History     Tobacco Use    Smoking status: Former     Types: Cigarettes    Smokeless tobacco: Never   Substance Use Topics    Alcohol use: Not Currently    Drug use: Not Currently         ALLERGIES  Allergies   Allergen Reactions    Adhesive Tape-Silicones Unknown    Cortisone Swelling    Diphenhydramine Hallucinations and Other     \"goes out of head\"    Latex Unknown    Morphine Unknown         MEDICATIONS  Current Outpatient Medications   Medication Sig Dispense Refill    acetaminophen (Tylenol) 325 mg tablet 2 tablet EVERY 4 HOURS (route: oral)      anastrozole (Arimidex) 1 mg tablet Take 1 tablet (1 mg total) by mouth once daily. 90 tablet 3    ascorbic acid (Vitamin C) 1,000 mg tablet 1 tablet DAILY (route: oral)      ascorbic acid, vitamin C, 500 mg capsule Take 1 capsule by mouth once daily.      aspirin 81 mg EC tablet Take 1 tablet (81 mg) by mouth once daily.      atorvastatin (Lipitor) 10 mg tablet Take 1 tablet (10 mg) by mouth once daily at bedtime. 30 tablet 3    cholecalciferol (Vitamin D-3) 50 MCG (2000 UT) tablet Take 1 tablet (2,000 Units) by mouth once daily.      famotidine (Pepcid) 20 mg tablet 1 tablet DAILY (route: oral)      fluticasone (Flonase Allergy Relief) 50 mcg/actuation nasal spray 2 spray DAILY (route: nasal)      Lactobacillus acidophilus capsule 1 capsule DAILY (route: oral)      levETIRAcetam (Keppra) 500 mg tablet TAKE 2 TABLETS BY MOUTH DAILY " 180 tablet 0    levETIRAcetam XR (Keppra XR) 500 mg 24 hr tablet TAKE 2 TABLETS(1000 MG) BY MOUTH EVERY  tablet 0    levETIRAcetam XR (Keppra XR) 500 mg 24 hr tablet TAKE 2 TABLETS(1000 MG) BY MOUTH EVERY  tablet 0    metoprolol succinate XL (Toprol-XL) 100 mg 24 hr tablet TAKE 1 TABLET(100 MG) BY MOUTH EVERY DAY 90 tablet 1    midodrine (Proamatine) 10 mg tablet Per instructions AS NEEDED (route: oral)      mirtazapine (Remeron) 15 mg tablet .5 tablet BEDTIME (route: oral)      naloxone (Narcan) 4 mg/0.1 mL nasal spray Administer into affected nostril(s).      RABEprazole (Aciphex) EC tablet Take 1 tablet (20 mg) by mouth once daily.      [START ON 8/16/2024] traMADol (Ultram) 50 mg tablet Take 1-2 tablets ( mg) by mouth 3 times a day as needed for severe pain (7 - 10). Do not fill before August 16, 2024. 180 tablet 0    valsartan-hydrochlorothiazide (Diovan-HCT) 160-12.5 mg tablet Take 1 tablet by mouth once daily. 30 tablet 3    valsartan-hydrochlorothiazide (Diovan-HCT) 320-25 mg tablet TAKE 1 TABLET BY MOUTH DAILY 90 tablet 3    valsartan-hydrochlorothiazide (Diovan-HCT) 80-12.5 mg tablet Per instructions DAILY (route: oral)       No current facility-administered medications for this visit.     PHYSICAL EXAM  Gen: Alert, NAD  HEENT:  PERRLA, EOMI, conjunctiva and sclera normal in appearance  Respiratory:  Lungs CTAB  Cardiovascular:  Heart RRR. No M/R/G  Neuro:  Gross motor and sensory intact  Skin:  No suspicious lesions present    ASSESSMENT/PLAN  Problem List Items Addressed This Visit       Lumbar radiculopathy    Relevant Medications    traMADol (Ultram) 50 mg tablet (Start on 8/16/2024)     Encounter Diagnoses   Name Primary?    Lumbar radiculopathy     Encounter for long-term (current) use of high-risk medication Yes    Osteoarthritis of lumbar spine, unspecified spinal osteoarthritis complication status      Agreed to continue plan of care as established by Dr. Lucero.  -Tramadol  as currently prescribed  -UDS and CSA are up to date  -RTC 3 mos or as needed    The patient was counseled on the appropriate use of the opioid medication, its potential dangers and the responsibilities that go along with being prescribed opioid medications including safe storage, use and disposal of the medication. Patient was also counseled on the adverse effects of long-term opioid use including hyperalgesia, tolerance, decreased immune function, and changes to the body's natural hormones.  The patient also understands the potential risks for respiratory depression especially if the medication is combined with other central nervous system depressants such as benzodiazepines or alcohol.    Miriam Nixon, APRN-CNP

## 2024-08-26 ENCOUNTER — OFFICE VISIT (OUTPATIENT)
Dept: CARDIOLOGY | Facility: CLINIC | Age: 89
End: 2024-08-26
Payer: MEDICARE

## 2024-08-26 VITALS
SYSTOLIC BLOOD PRESSURE: 128 MMHG | HEART RATE: 73 BPM | BODY MASS INDEX: 23.87 KG/M2 | HEIGHT: 60 IN | WEIGHT: 121.56 LBS | DIASTOLIC BLOOD PRESSURE: 78 MMHG | OXYGEN SATURATION: 95 %

## 2024-08-26 DIAGNOSIS — I70.0 ATHEROSCLEROSIS OF AORTA (CMS-HCC): Primary | ICD-10-CM

## 2024-08-26 DIAGNOSIS — I10 HYPERTENSION, UNSPECIFIED TYPE: ICD-10-CM

## 2024-08-26 DIAGNOSIS — I51.7 LVH (LEFT VENTRICULAR HYPERTROPHY): ICD-10-CM

## 2024-08-26 PROCEDURE — 3078F DIAST BP <80 MM HG: CPT | Performed by: INTERNAL MEDICINE

## 2024-08-26 PROCEDURE — 1159F MED LIST DOCD IN RCRD: CPT | Performed by: INTERNAL MEDICINE

## 2024-08-26 PROCEDURE — 99214 OFFICE O/P EST MOD 30 MIN: CPT | Performed by: INTERNAL MEDICINE

## 2024-08-26 PROCEDURE — 1125F AMNT PAIN NOTED PAIN PRSNT: CPT | Performed by: INTERNAL MEDICINE

## 2024-08-26 PROCEDURE — 3074F SYST BP LT 130 MM HG: CPT | Performed by: INTERNAL MEDICINE

## 2024-08-26 ASSESSMENT — ENCOUNTER SYMPTOMS
ABDOMINAL PAIN: 0
DYSURIA: 0
FEVER: 0
NAUSEA: 0
FALLS: 0
HEMOPTYSIS: 0
HEMATURIA: 0
ALTERED MENTAL STATUS: 0
MEMORY LOSS: 0
CHILLS: 0
VOMITING: 0
LOSS OF SENSATION IN FEET: 0
CONSTIPATION: 0
COUGH: 0
HEADACHES: 0
DIARRHEA: 0
DEPRESSION: 0
OCCASIONAL FEELINGS OF UNSTEADINESS: 0
BLOATING: 0
WHEEZING: 0
MYALGIAS: 0

## 2024-08-26 ASSESSMENT — COLUMBIA-SUICIDE SEVERITY RATING SCALE - C-SSRS
1. IN THE PAST MONTH, HAVE YOU WISHED YOU WERE DEAD OR WISHED YOU COULD GO TO SLEEP AND NOT WAKE UP?: NO
6. HAVE YOU EVER DONE ANYTHING, STARTED TO DO ANYTHING, OR PREPARED TO DO ANYTHING TO END YOUR LIFE?: NO
2. HAVE YOU ACTUALLY HAD ANY THOUGHTS OF KILLING YOURSELF?: NO

## 2024-08-26 ASSESSMENT — PAIN SCALES - GENERAL: PAINLEVEL: 8

## 2024-08-26 NOTE — PROGRESS NOTES
Chief Complaint   Patient presents with    Follow-up    Hypertension       HPI  88 yo BF w/ h/o LVH/?HCM, HTN, HLD, athero of Ao, breast Ca now here for cardiology f/u. No chest pain. No dyspnea at rest. +occ mild THOMAS (mod exertion). No orthopnea/PND. No palps. No LH/dizziness/syncope. No edema. No claudication. No cough. +occ fatigue.  BP at home (occ checked): 130s/80s  ECG : SR (65), LVH w/ IVCD/ST-T changes, IMI  ECG : SR (91), LVH w/ IVCD/ST-T changes, IMI  ECG : ST (121), LVH w/ IVCD/ST-T changes, IMI  ECG : SR (64), LVH w/ IVCD/ST-T changes, IMI   ECG : SR (66), LVH w/ ST-T changes, IMI  HM 3/13: SR, HR 49-95  Echo 9/15: EF 75%, DD, LVOT w/ Makayla 48  Echo : EF >80%, DD, sev cLVH  CTA chest : no PE, no aneurysm, CM/LVH, no peric eff, no mention of cor calc  CT ab : athero of Ao, no AAA, nl IVC, emphysema, CM/LVH, no peric eff  CT ab : dense AA + branches, no AAA, nl IVC  CT brain : no acute abnl     Review of Systems   Constitutional: Negative for chills, fever and malaise/fatigue.   HENT:  Negative for hearing loss.    Eyes:  Negative for visual disturbance.   Respiratory:  Negative for cough, hemoptysis and wheezing.    Skin:  Negative for rash.   Musculoskeletal:  Negative for falls and myalgias.   Gastrointestinal:  Negative for bloating, abdominal pain, constipation, diarrhea, dysphagia, nausea and vomiting.   Genitourinary:  Negative for dysuria and hematuria.   Neurological:  Negative for headaches.   Psychiatric/Behavioral:  Negative for altered mental status, depression and memory loss.       Social History     Tobacco Use    Smoking status: Former     Types: Cigarettes    Smokeless tobacco: Never   Substance Use Topics    Alcohol use: Not Currently      Family History   Problem Relation Name Age of Onset    Lung cancer Father      Other (Carcinoma of the pancreas) Sister      Kidney cancer Sister      Arrhythmia Brother          ALl 3 Brothers      "Other (Parkinson disease) Brother      Prostate cancer Brother        Allergies   Allergen Reactions    Adhesive Tape-Silicones Unknown    Cortisone Swelling    Diphenhydramine Hallucinations and Other     \"goes out of head\"    Latex Unknown    Morphine Unknown      Current Outpatient Medications   Medication Instructions    acetaminophen (Tylenol) 325 mg tablet Take 2 tablets (650 mg) by mouth every 4 hours if needed for mild pain (1 - 3) or moderate pain (4 - 6).    anastrozole (ARIMIDEX) 1 mg, oral, Daily    ascorbic acid, vitamin C, 500 mg capsule 1 capsule, oral, Daily    aspirin 81 mg EC tablet 1 tablet, oral, Daily    atorvastatin (LIPITOR) 10 mg, oral, Nightly    cholecalciferol (Vitamin D-3) 50 MCG (2000 UT) tablet 1 tablet, oral, Daily    famotidine (Pepcid) 20 mg tablet Take 1 tablet (20 mg) by mouth once daily.    fluticasone (Flonase Allergy Relief) 50 mcg/actuation nasal spray Administer 2 sprays into each nostril once daily as needed for rhinitis or allergies.    Lactobacillus acidophilus capsule Take 1 capsule by mouth once daily.    levETIRAcetam (Keppra) 500 mg tablet TAKE 2 TABLETS BY MOUTH DAILY    levETIRAcetam XR (Keppra XR) 500 mg 24 hr tablet TAKE 2 TABLETS(1000 MG) BY MOUTH EVERY DAY    levETIRAcetam XR (Keppra XR) 500 mg 24 hr tablet TAKE 2 TABLETS(1000 MG) BY MOUTH EVERY DAY    metoprolol succinate XL (TOPROL-XL) 100 mg, oral, Daily    midodrine (Proamatine) 10 mg tablet Per instructions AS NEEDED (route: oral)    mirtazapine (Remeron) 15 mg tablet Take 0.5 tablets (7.5 mg) by mouth once daily at bedtime.    naloxone (Narcan) 4 mg/0.1 mL nasal spray 1 spray, nasal, As needed    RABEprazole (Aciphex) EC tablet 1 tablet, oral, Daily    traMADol (ULTRAM)  mg, oral, 3 times daily PRN    valsartan-hydrochlorothiazide (Diovan-HCT) 160-12.5 mg tablet 1 tablet, oral, Daily    valsartan-hydrochlorothiazide (Diovan-HCT) 320-25 mg tablet 1 tablet, oral, Daily      Vitals:    08/26/24 0959   BP: " 128/78   Pulse: 73   SpO2: 95%      Physical Exam  Constitutional:       Appearance: Normal appearance.   HENT:      Head: Normocephalic and atraumatic.      Nose: Nose normal.   Neck:      Vascular: No carotid bruit.   Cardiovascular:      Rate and Rhythm: Normal rate and regular rhythm.      Heart sounds: Murmur heard.      Systolic murmur is present with a grade of 1/6.   Pulmonary:      Effort: Pulmonary effort is normal.      Breath sounds: Normal breath sounds.   Abdominal:      Palpations: Abdomen is soft.      Tenderness: There is no abdominal tenderness.   Musculoskeletal:      Right lower leg: No edema.      Left lower leg: No edema.   Skin:     General: Skin is warm and dry.   Neurological:      General: No focal deficit present.      Mental Status: She is alert.   Psychiatric:         Mood and Affect: Mood normal.         Judgment: Judgment normal.        Results/Data  10/23 Cr 0.73, K 4.1, LDL 89, HDL 82, TG 68, Chol 185, HGB 13.6, , TSH 0.86  9/22 Cr 0.75, K 3.9, LFT nl, HGB 13.7,   5/22 Cr 0.74, K 4.3, LDL 61, HDL 87, TG 91, CHol 167  2/22 Cr 0.64, K 3.3, LFT nl, HGB 11.1,   12/21 Mg 2.1, TSH 0.57, TPN 0.16,     Assessment/Plan   88 yo BF w/ h/o LVH/?HCM, HTN, HLD, athero of Ao, breast Ca. Doing well. No sig cardiac symptoms.   -okay to continue ASA 81 qd (with food)  -continue Metoprolol Succinate 100 qd  -continue Valsartan-HCTZ 320-25 qd  -continue Atorva 10 qhs (no change in aches/pains w/ holding); only indication is athero of Ao so may not need  -f/u 1 year (earlier if needed)     Handy Mike MD

## 2024-08-29 ENCOUNTER — TELEPHONE (OUTPATIENT)
Dept: PRIMARY CARE | Facility: CLINIC | Age: 89
End: 2024-08-29

## 2024-09-19 DIAGNOSIS — M54.16 LUMBAR RADICULOPATHY: ICD-10-CM

## 2024-09-19 RX ORDER — TRAMADOL HYDROCHLORIDE 50 MG/1
50-100 TABLET ORAL 3 TIMES DAILY PRN
Qty: 180 TABLET | Refills: 0 | Status: SHIPPED | OUTPATIENT
Start: 2024-09-19 | End: 2024-10-19

## 2024-10-17 DIAGNOSIS — M54.16 LUMBAR RADICULOPATHY: ICD-10-CM

## 2024-10-17 RX ORDER — TRAMADOL HYDROCHLORIDE 50 MG/1
50-100 TABLET ORAL 3 TIMES DAILY PRN
Qty: 180 TABLET | Refills: 0 | Status: SHIPPED | OUTPATIENT
Start: 2024-10-17 | End: 2024-11-16

## 2024-10-18 DIAGNOSIS — I10 BENIGN ESSENTIAL HYPERTENSION: ICD-10-CM

## 2024-10-19 RX ORDER — METOPROLOL SUCCINATE 100 MG/1
100 TABLET, EXTENDED RELEASE ORAL DAILY
Qty: 90 TABLET | Refills: 1 | Status: SHIPPED | OUTPATIENT
Start: 2024-10-19

## 2024-10-25 ENCOUNTER — OFFICE VISIT (OUTPATIENT)
Dept: HEMATOLOGY/ONCOLOGY | Facility: CLINIC | Age: 89
End: 2024-10-25
Payer: MEDICARE

## 2024-10-25 VITALS
BODY MASS INDEX: 22.02 KG/M2 | DIASTOLIC BLOOD PRESSURE: 71 MMHG | WEIGHT: 112.77 LBS | SYSTOLIC BLOOD PRESSURE: 130 MMHG | HEART RATE: 75 BPM | TEMPERATURE: 97 F

## 2024-10-25 DIAGNOSIS — Z17.0 MALIGNANT NEOPLASM OF OVERLAPPING SITES OF RIGHT BREAST IN FEMALE, ESTROGEN RECEPTOR POSITIVE: ICD-10-CM

## 2024-10-25 DIAGNOSIS — Z79.811 ENCOUNTER FOR MONITORING AROMATASE INHIBITOR THERAPY: Primary | ICD-10-CM

## 2024-10-25 DIAGNOSIS — C50.411 MALIGNANT NEOPLASM OF UPPER-OUTER QUADRANT OF RIGHT BREAST IN FEMALE, ESTROGEN RECEPTOR POSITIVE: ICD-10-CM

## 2024-10-25 DIAGNOSIS — Z13.820 ENCOUNTER FOR OSTEOPOROSIS SCREENING IN ASYMPTOMATIC POSTMENOPAUSAL PATIENT: ICD-10-CM

## 2024-10-25 DIAGNOSIS — C50.811 MALIGNANT NEOPLASM OF OVERLAPPING SITES OF RIGHT BREAST IN FEMALE, ESTROGEN RECEPTOR POSITIVE: ICD-10-CM

## 2024-10-25 DIAGNOSIS — Z51.81 ENCOUNTER FOR MONITORING AROMATASE INHIBITOR THERAPY: Primary | ICD-10-CM

## 2024-10-25 DIAGNOSIS — Z17.0 MALIGNANT NEOPLASM OF UPPER-OUTER QUADRANT OF RIGHT BREAST IN FEMALE, ESTROGEN RECEPTOR POSITIVE: ICD-10-CM

## 2024-10-25 DIAGNOSIS — Z78.0 ENCOUNTER FOR OSTEOPOROSIS SCREENING IN ASYMPTOMATIC POSTMENOPAUSAL PATIENT: ICD-10-CM

## 2024-10-25 PROCEDURE — 3078F DIAST BP <80 MM HG: CPT | Performed by: NURSE PRACTITIONER

## 2024-10-25 PROCEDURE — 99215 OFFICE O/P EST HI 40 MIN: CPT | Performed by: NURSE PRACTITIONER

## 2024-10-25 PROCEDURE — 1159F MED LIST DOCD IN RCRD: CPT | Performed by: NURSE PRACTITIONER

## 2024-10-25 PROCEDURE — 3075F SYST BP GE 130 - 139MM HG: CPT | Performed by: NURSE PRACTITIONER

## 2024-10-25 PROCEDURE — 1126F AMNT PAIN NOTED NONE PRSNT: CPT | Performed by: NURSE PRACTITIONER

## 2024-10-25 ASSESSMENT — PATIENT HEALTH QUESTIONNAIRE - PHQ9
1. LITTLE INTEREST OR PLEASURE IN DOING THINGS: NOT AT ALL
SUM OF ALL RESPONSES TO PHQ9 QUESTIONS 1 & 2: 0
2. FEELING DOWN, DEPRESSED OR HOPELESS: NOT AT ALL

## 2024-10-25 ASSESSMENT — PAIN SCALES - GENERAL: PAINLEVEL_OUTOF10: 0-NO PAIN

## 2024-10-29 PROBLEM — Z78.0 ENCOUNTER FOR OSTEOPOROSIS SCREENING IN ASYMPTOMATIC POSTMENOPAUSAL PATIENT: Status: ACTIVE | Noted: 2024-10-29

## 2024-10-29 PROBLEM — Z13.820 ENCOUNTER FOR OSTEOPOROSIS SCREENING IN ASYMPTOMATIC POSTMENOPAUSAL PATIENT: Status: ACTIVE | Noted: 2024-10-29

## 2024-10-29 RX ORDER — ANASTROZOLE 1 MG/1
1 TABLET ORAL DAILY
Qty: 30 TABLET | Refills: 11 | Status: SHIPPED | OUTPATIENT
Start: 2024-10-29

## 2024-11-20 ENCOUNTER — APPOINTMENT (OUTPATIENT)
Dept: PAIN MEDICINE | Facility: CLINIC | Age: 89
End: 2024-11-20
Payer: MEDICARE

## 2024-11-20 ENCOUNTER — LAB (OUTPATIENT)
Dept: LAB | Facility: LAB | Age: 89
End: 2024-11-20
Payer: MEDICARE

## 2024-11-20 DIAGNOSIS — Z79.899 ENCOUNTER FOR LONG-TERM (CURRENT) USE OF HIGH-RISK MEDICATION: ICD-10-CM

## 2024-11-20 DIAGNOSIS — Z79.899 ENCOUNTER FOR LONG-TERM (CURRENT) USE OF HIGH-RISK MEDICATION: Primary | ICD-10-CM

## 2024-11-20 DIAGNOSIS — M54.16 LUMBAR RADICULOPATHY: ICD-10-CM

## 2024-11-20 LAB
AMPHETAMINES UR QL SCN: NORMAL
BARBITURATES UR QL SCN: NORMAL
BZE UR QL SCN: NORMAL
CANNABINOIDS UR QL SCN: NORMAL
CREAT UR-MCNC: 41.4 MG/DL (ref 20–320)
PCP UR QL SCN: NORMAL

## 2024-11-20 PROCEDURE — 99214 OFFICE O/P EST MOD 30 MIN: CPT | Performed by: NURSE PRACTITIONER

## 2024-11-20 PROCEDURE — 80346 BENZODIAZEPINES1-12: CPT

## 2024-11-20 PROCEDURE — 80368 SEDATIVE HYPNOTICS: CPT

## 2024-11-20 PROCEDURE — 80307 DRUG TEST PRSMV CHEM ANLYZR: CPT

## 2024-11-20 PROCEDURE — 80373 DRUG SCREENING TRAMADOL: CPT

## 2024-11-20 PROCEDURE — 1160F RVW MEDS BY RX/DR IN RCRD: CPT | Performed by: NURSE PRACTITIONER

## 2024-11-20 PROCEDURE — 80365 DRUG SCREENING OXYCODONE: CPT

## 2024-11-20 PROCEDURE — 80354 DRUG SCREENING FENTANYL: CPT

## 2024-11-20 PROCEDURE — 80358 DRUG SCREENING METHADONE: CPT

## 2024-11-20 PROCEDURE — 1036F TOBACCO NON-USER: CPT | Performed by: NURSE PRACTITIONER

## 2024-11-20 PROCEDURE — 82570 ASSAY OF URINE CREATININE: CPT

## 2024-11-20 PROCEDURE — 1159F MED LIST DOCD IN RCRD: CPT | Performed by: NURSE PRACTITIONER

## 2024-11-20 PROCEDURE — 80361 OPIATES 1 OR MORE: CPT

## 2024-11-20 RX ORDER — TRAMADOL HYDROCHLORIDE 50 MG/1
50-100 TABLET ORAL 3 TIMES DAILY PRN
Qty: 180 TABLET | Refills: 0 | Status: SHIPPED | OUTPATIENT
Start: 2024-11-20 | End: 2024-12-20

## 2024-11-20 NOTE — PROGRESS NOTES
Chief Complaint/HPI:  Pt presents today for medication review and refill. Low back pain rated 10/10 today. PMH includes chronic lumbar radiculopathy.  She is maintained on tramadol TID prn. This is moderately effective for her and she denies adverse side effects.    OARRS:  No data recorded  I have personally reviewed the OARRS report for Ann Marie Murphy. I have considered the risks of abuse, dependence, addiction and diversion    Is the patient prescribed a combination of a benzodiazepine and opioid?  No    Last Urine Drug Screen / ordered today: Yes ordered today  No results found for this or any previous visit (from the past 8760 hours).  Results are as expected.     Controlled Substance Agreement:  Date of the Last Agreement: 11/2024  Reviewed Controlled Substance Agreement including but not limited to the benefits, risks, and alternatives to treatment with a Controlled Substance medication(s).      ROS otherwise negative aside from what was mentioned above in HPI.      Patient Active Problem List   Diagnosis    Hypertrophic cardiomyopathy (Multi)    Hypertension    Hypercalcemia    Excessive cerumen in ear canal, right    Cerumen impaction    Convulsions (Multi)    Combined hyperlipidemia    Breast mass, right    Breast cancer, right (Multi)    Atherosclerosis of aorta (CMS-HCC)    Acute UTI    Abnormal finding on breast imaging    Abnormal urinalysis    Anemia    Osteoporosis    Osteoarthritis of lumbar spine    LVH (left ventricular hypertrophy)    Lumbar radiculopathy    Pain of finger of left hand    Right hand paresthesia    Urinary retention    Dementia in other diseases classified elsewhere, unspecified severity, without behavioral disturbance, psychotic disturbance, mood disturbance, and anxiety (Multi)    Acute respiratory failure with hypoxia (Multi)    Cushing's syndrome, unspecified    COVID-19    History of falling    Long term (current) use of aspirin    Personal history of nicotine dependence     Crush syndrome (CMS-HCC)    Anxiety disorder, unspecified    Diverticulosis of intestine, part unspecified, without perforation or abscess without bleeding    Gastro-esophageal reflux disease without esophagitis    Heart failure, unspecified    History of CVA (cerebrovascular accident) without residual deficits    History of rheumatic fever    Localization-related (focal) (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, not intractable, without status epilepticus    Obstructive and reflux uropathy, unspecified    Pneumonia due to coronavirus disease 2019    Hyperlipidemia, unspecified    Essential (primary) hypertension    Encounter for monitoring aromatase inhibitor therapy    Encounter for osteoporosis screening in asymptomatic postmenopausal patient     Past Medical History:   Diagnosis Date    Anxiety disorder due to known physiological condition     Anxiety disorder due to a general medical condition    Cardiomyopathy, unspecified     Cardiomyopathy    Diverticulosis of intestine, part unspecified, without perforation or abscess without bleeding     Diverticulosis    Encounter for screening mammogram for malignant neoplasm of breast     Visit for screening mammogram    Localization-related (focal) (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, not intractable, without status epilepticus     Complex partial seizure    Other conditions influencing health status     Ovarian Torsion On The Left    Other conditions influencing health status     Stroke syndrome    Other specified noninflammatory disorders of uterus     Fibrosis of uterus    Personal history of other diseases of the circulatory system     History of hypertension    Personal history of other diseases of the circulatory system     History of hypertrophic cardiomyopathy    Personal history of other diseases of the digestive system     History of cholelithiasis    Personal  "history of other diseases of the digestive system     History of hemorrhoids    Personal history of other diseases of the digestive system     History of hiatal hernia    Personal history of other diseases of the digestive system     History of esophageal reflux    Personal history of other diseases of the female genital tract 2020    History of breast pain    Personal history of other endocrine, nutritional and metabolic disease     History of hypercholesterolemia    Personal history of other endocrine, nutritional and metabolic disease 2016    History of Cushing's syndrome    Personal history of transient ischemic attack (TIA), and cerebral infarction without residual deficits     History of transient cerebral ischemia    Unspecified convulsions (Multi)     Generalized convulsive seizure     Past Surgical History:   Procedure Laterality Date    APPENDECTOMY  2014    Appendectomy    COLONOSCOPY  2014    Complete Colonoscopy    EXPLORATORY LAPAROTOMY  2014    Exploratory Laparotomy    HERNIA REPAIR  2014    Hernia Repair    HYSTERECTOMY  2014    Hysterectomy    OTHER SURGICAL HISTORY  2014    Upper Gastrointestinal Endoscopy (Therapeutic)    OTHER SURGICAL HISTORY  2014    Surgery    UMBILICAL HERNIA REPAIR  2014    Umbilical Hernia Repair     Family History   Problem Relation Name Age of Onset    Lung cancer Father      Other (Carcinoma of the pancreas) Sister      Kidney cancer Sister      Arrhythmia Brother          ALl 3 Brothers     Other (Parkinson disease) Brother      Prostate cancer Brother       Social History     Tobacco Use    Smoking status: Former     Types: Cigarettes    Smokeless tobacco: Never   Substance Use Topics    Alcohol use: Not Currently    Drug use: Not Currently         ALLERGIES  Allergies   Allergen Reactions    Adhesive Tape-Silicones Unknown    Cortisone Swelling    Diphenhydramine Hallucinations and Other     \"goes " "out of head\"    Latex Unknown    Morphine Unknown         MEDICATIONS  Current Outpatient Medications   Medication Sig Dispense Refill    acetaminophen (Tylenol) 325 mg tablet Take 2 tablets (650 mg) by mouth every 4 hours if needed for mild pain (1 - 3) or moderate pain (4 - 6).      anastrozole (Arimidex) 1 mg tablet Take 1 tablet (1 mg total) by mouth once daily. 30 tablet 11    ascorbic acid, vitamin C, 500 mg capsule Take 1 capsule by mouth once daily.      aspirin 81 mg EC tablet Take 1 tablet (81 mg) by mouth once daily.      atorvastatin (Lipitor) 10 mg tablet Take 1 tablet (10 mg) by mouth once daily at bedtime. 30 tablet 3    cholecalciferol (Vitamin D-3) 50 MCG (2000 UT) tablet Take 1 tablet (2,000 Units) by mouth once daily.      famotidine (Pepcid) 20 mg tablet Take 1 tablet (20 mg) by mouth once daily.      fluticasone (Flonase Allergy Relief) 50 mcg/actuation nasal spray Administer 2 sprays into each nostril once daily as needed for rhinitis or allergies.      Lactobacillus acidophilus capsule Take 1 capsule by mouth once daily.      levETIRAcetam (Keppra) 500 mg tablet TAKE 2 TABLETS BY MOUTH DAILY 180 tablet 0    levETIRAcetam XR (Keppra XR) 500 mg 24 hr tablet TAKE 2 TABLETS(1000 MG) BY MOUTH EVERY  tablet 0    levETIRAcetam XR (Keppra XR) 500 mg 24 hr tablet TAKE 2 TABLETS(1000 MG) BY MOUTH EVERY  tablet 0    metoprolol succinate XL (Toprol-XL) 100 mg 24 hr tablet TAKE 1 TABLET(100 MG) BY MOUTH EVERY DAY 90 tablet 1    midodrine (Proamatine) 10 mg tablet Per instructions AS NEEDED (route: oral)      mirtazapine (Remeron) 15 mg tablet Take 0.5 tablets (7.5 mg) by mouth once daily at bedtime.      naloxone (Narcan) 4 mg/0.1 mL nasal spray Administer 1 spray (4 mg) into affected nostril(s) if needed for opioid reversal or respiratory depression.      RABEprazole (Aciphex) EC tablet Take 1 tablet (20 mg) by mouth once daily.      traMADol (Ultram) 50 mg tablet Take 1-2 tablets ( " mg) by mouth 3 times a day as needed for severe pain (7 - 10). 180 tablet 0    valsartan-hydrochlorothiazide (Diovan-HCT) 320-25 mg tablet TAKE 1 TABLET BY MOUTH DAILY 90 tablet 3     No current facility-administered medications for this visit.     PHYSICAL EXAM  Gen: Alert, NAD  HEENT:  PERRLA, EOMI, conjunctiva and sclera normal in appearance  Respiratory:  Normal effort, no SOB  Cardiovascular:  Heart RRR  Neuro:  Gross motor and sensory intact  Skin:  No suspicious lesions present    ASSESSMENT/PLAN  Problem List Items Addressed This Visit    None    Encounter Diagnoses   Name Primary?    Lumbar radiculopathy     Encounter for long-term (current) use of high-risk medication Yes     Appropriate to continue plan of care as established with Dr. Lucero.  -Tramadol as currently prescribed  -UDS and CSA updated today  -RTC 3 mos or as needed. May do VV in winter.    The patient was counseled on the appropriate use of the opioid medication, its potential dangers and the responsibilities that go along with being prescribed opioid medications including safe storage, use and disposal of the medication. Patient was also counseled on the adverse effects of long-term opioid use including hyperalgesia, tolerance, decreased immune function, and changes to the body's natural hormones.  The patient also understands the potential risks for respiratory depression especially if the medication is combined with other central nervous system depressants such as benzodiazepines or alcohol.    Miriam Nixon, COOPER-CNP

## 2024-11-22 LAB
1OH-MIDAZOLAM UR CFM-MCNC: <25 NG/ML
6MAM UR CFM-MCNC: <25 NG/ML
7AMINOCLONAZEPAM UR CFM-MCNC: <25 NG/ML
A-OH ALPRAZ UR CFM-MCNC: <25 NG/ML
ALPRAZ UR CFM-MCNC: <25 NG/ML
CHLORDIAZEP UR CFM-MCNC: <25 NG/ML
CLONAZEPAM UR CFM-MCNC: <25 NG/ML
CODEINE UR CFM-MCNC: <50 NG/ML
DIAZEPAM UR CFM-MCNC: <25 NG/ML
EDDP UR CFM-MCNC: <25 NG/ML
FENTANYL UR CFM-MCNC: <2.5 NG/ML
HYDROCODONE CTO UR CFM-MCNC: <25 NG/ML
HYDROMORPHONE UR CFM-MCNC: <25 NG/ML
LORAZEPAM UR CFM-MCNC: <25 NG/ML
METHADONE UR CFM-MCNC: <25 NG/ML
MIDAZOLAM UR CFM-MCNC: <25 NG/ML
MORPHINE UR CFM-MCNC: <50 NG/ML
NORDIAZEPAM UR CFM-MCNC: <25 NG/ML
NORFENTANYL UR CFM-MCNC: <2.5 NG/ML
NORHYDROCODONE UR CFM-MCNC: <25 NG/ML
NOROXYCODONE UR CFM-MCNC: <25 NG/ML
NORTRAMADOL UR-MCNC: >1000 NG/ML
OXAZEPAM UR CFM-MCNC: <25 NG/ML
OXYCODONE UR CFM-MCNC: <25 NG/ML
OXYMORPHONE UR CFM-MCNC: <25 NG/ML
TEMAZEPAM UR CFM-MCNC: <25 NG/ML
TRAMADOL UR CFM-MCNC: >1000 NG/ML
ZOLPIDEM UR CFM-MCNC: <25 NG/ML
ZOLPIDEM UR-MCNC: <25 NG/ML

## 2024-12-19 DIAGNOSIS — M54.16 LUMBAR RADICULOPATHY: ICD-10-CM

## 2024-12-19 RX ORDER — TRAMADOL HYDROCHLORIDE 50 MG/1
50-100 TABLET ORAL 3 TIMES DAILY PRN
Qty: 180 TABLET | Refills: 0 | Status: SHIPPED | OUTPATIENT
Start: 2024-12-19 | End: 2025-01-18

## 2025-01-20 DIAGNOSIS — M54.16 LUMBAR RADICULOPATHY: ICD-10-CM

## 2025-01-21 RX ORDER — TRAMADOL HYDROCHLORIDE 50 MG/1
50-100 TABLET ORAL 3 TIMES DAILY PRN
Qty: 180 TABLET | Refills: 0 | Status: SHIPPED | OUTPATIENT
Start: 2025-01-21 | End: 2025-02-20

## 2025-01-23 ENCOUNTER — HOSPITAL ENCOUNTER (OUTPATIENT)
Dept: RADIOLOGY | Facility: CLINIC | Age: 89
Discharge: HOME | End: 2025-01-23
Payer: MEDICARE

## 2025-01-23 ENCOUNTER — OFFICE VISIT (OUTPATIENT)
Dept: HEMATOLOGY/ONCOLOGY | Facility: CLINIC | Age: 89
End: 2025-01-23
Payer: MEDICARE

## 2025-01-23 VITALS
BODY MASS INDEX: 24.93 KG/M2 | OXYGEN SATURATION: 94 % | SYSTOLIC BLOOD PRESSURE: 163 MMHG | HEART RATE: 75 BPM | TEMPERATURE: 98.2 F | WEIGHT: 127.65 LBS | DIASTOLIC BLOOD PRESSURE: 75 MMHG

## 2025-01-23 DIAGNOSIS — Z17.0 MALIGNANT NEOPLASM OF UPPER-OUTER QUADRANT OF RIGHT BREAST IN FEMALE, ESTROGEN RECEPTOR POSITIVE: ICD-10-CM

## 2025-01-23 DIAGNOSIS — C50.411 MALIGNANT NEOPLASM OF UPPER-OUTER QUADRANT OF RIGHT BREAST IN FEMALE, ESTROGEN RECEPTOR POSITIVE: ICD-10-CM

## 2025-01-23 DIAGNOSIS — C50.411 MALIGNANT NEOPLASM OF UPPER-OUTER QUADRANT OF RIGHT BREAST IN FEMALE, ESTROGEN RECEPTOR POSITIVE: Primary | ICD-10-CM

## 2025-01-23 DIAGNOSIS — Z79.811 ENCOUNTER FOR MONITORING AROMATASE INHIBITOR THERAPY: ICD-10-CM

## 2025-01-23 DIAGNOSIS — Z17.0 MALIGNANT NEOPLASM OF UPPER-OUTER QUADRANT OF RIGHT BREAST IN FEMALE, ESTROGEN RECEPTOR POSITIVE: Primary | ICD-10-CM

## 2025-01-23 DIAGNOSIS — Z09 ONCOLOGY FOLLOW-UP ENCOUNTER: ICD-10-CM

## 2025-01-23 DIAGNOSIS — Z51.81 ENCOUNTER FOR MONITORING AROMATASE INHIBITOR THERAPY: ICD-10-CM

## 2025-01-23 PROCEDURE — 99213 OFFICE O/P EST LOW 20 MIN: CPT | Performed by: NURSE PRACTITIONER

## 2025-01-23 PROCEDURE — 3077F SYST BP >= 140 MM HG: CPT | Performed by: NURSE PRACTITIONER

## 2025-01-23 PROCEDURE — G0279 TOMOSYNTHESIS, MAMMO: HCPCS | Performed by: RADIOLOGY

## 2025-01-23 PROCEDURE — 76642 ULTRASOUND BREAST LIMITED: CPT | Performed by: RADIOLOGY

## 2025-01-23 PROCEDURE — 1159F MED LIST DOCD IN RCRD: CPT | Performed by: NURSE PRACTITIONER

## 2025-01-23 PROCEDURE — 77066 DX MAMMO INCL CAD BI: CPT | Performed by: RADIOLOGY

## 2025-01-23 PROCEDURE — 77062 BREAST TOMOSYNTHESIS BI: CPT

## 2025-01-23 PROCEDURE — 3078F DIAST BP <80 MM HG: CPT | Performed by: NURSE PRACTITIONER

## 2025-01-23 PROCEDURE — 1160F RVW MEDS BY RX/DR IN RCRD: CPT | Performed by: NURSE PRACTITIONER

## 2025-01-23 PROCEDURE — 76642 ULTRASOUND BREAST LIMITED: CPT | Mod: RT

## 2025-01-23 PROCEDURE — 76982 USE 1ST TARGET LESION: CPT | Mod: RT

## 2025-01-23 PROCEDURE — 1036F TOBACCO NON-USER: CPT | Performed by: NURSE PRACTITIONER

## 2025-01-23 PROCEDURE — 1125F AMNT PAIN NOTED PAIN PRSNT: CPT | Performed by: NURSE PRACTITIONER

## 2025-01-23 ASSESSMENT — PAIN SCALES - GENERAL: PAINLEVEL_OUTOF10: 2

## 2025-01-23 ASSESSMENT — PATIENT HEALTH QUESTIONNAIRE - PHQ9
SUM OF ALL RESPONSES TO PHQ9 QUESTIONS 1 & 2: 0
2. FEELING DOWN, DEPRESSED OR HOPELESS: NOT AT ALL
1. LITTLE INTEREST OR PLEASURE IN DOING THINGS: NOT AT ALL

## 2025-01-23 NOTE — PATIENT INSTRUCTIONS
Your exam and mammogram are stable!  Continue anastrozole 1mg daily.  Your bone density is scheduled for May 1st @ 11:00 at the [facility].  Please call if there is any change in the breasts.   Remain as active as you can!  I will see you back in July.  We will go to yearly mammograms.

## 2025-01-23 NOTE — PROGRESS NOTES
Oncology Follow-Up    Ann Marie Murphy  [id number]       AJCC Edition: 8th (AJCC), Diagnosis Date: 05-Aug-2022, IB, cT2 cN0 cM0 G2      87-year-old postmenopausal AA female with right-sided invasive ductal carcinoma, clinical stage IB (T2 N0 M0). The patient's breast cancer was diagnosed on August 5, 2022, and is grade 2, estrogen receptor positive at 90%, progesterone receptor positive at 30%, and HER-2/german negative. MammaPrint Index = +0.417.     CURRENT THERAPY: Neoadjuvant Arimidex 1 mg by mouth daily      Details of her history are as follows:      Patient palpated a right breast lump x 3 months   08/01/2022: Patient underwent bilateral diagnostic mammogram with right breast US. This revealed extremely dense breast with a spiculated mass in the right breast. US revealed a mass at 10:00, 10cm from the nipple measuring 2.0 x 1.2 x 1.3cm.   08/05/2022: Patient underwent an US-guided core biopsy of the right breast at 10:00, 10 cm from the nipple. Pathology was consistent with invasive ductal carcinoma with receptors as above   08/30/2022: Patient was started on neoadjuvant Arimidex 1mg by mouth daily      Subjective    Ms. Murphy presents for her Oncology Follow Up visit for right breast cancer treated with neoadjuvant anastrozole. She is accompanied by her daughter. Ms. Murphy reports no issues with her breast. She has lower left abdominal pain over the last few years with work up being negative.         Objective      Vitals:    01/23/25 1523   BP: 163/75   Pulse: 75   Temp: 36.8 °C (98.2 °F)   SpO2: 94%        Constitutional: Well developed, alert/oriented x3, no distress, cooperative   Eyes: clear sclera   ENMT: mucous membranes moist, no apparent lesions   Head/Neck: Neck supple, no bruits   Respiratory/Thorax: Patent airways, normal breath sounds with good chest expansion   Cardiovascular: Regular rate and rhythm, no murmurs, 2+ equal pulses of the extremities,   Gastrointestinal: Nondistended, soft,  non-tender, no masses palpable, no organomegaly   Musculoskeletal: ROM intact, no joint swelling, normal strength   Extremities: normal extremities, no edema, cyanosis, contusions or wounds   Neurological: alert and oriented x3,  normal strength   Breast:     Lymphatic: No significant lymphadenopathy   Psychological: Appropriate mood and behavior   Skin: Warm and dry, no lesions, no rashes      Physical Exam  Chest:          Comments: Left breast with area of thickening where tumor was at 10:00 - 10cm from nipple, difficult to differentiate borders; no masses, nodules, skin changes, discharge, grainy, fat replaced breast tissue. Right breast without masses, nodules, skin changes, discharge, grainy, fat replaced tissue          Lab Results   Component Value Date    WBC 7.1 10/27/2023    HGB 13.6 10/27/2023    HCT 42.8 10/27/2023    MCV 97 10/27/2023     10/27/2023       Chemistry    Lab Results   Component Value Date/Time     10/27/2023 1133    K 4.1 10/27/2023 1133     10/27/2023 1133    CO2 28 10/27/2023 1133    BUN 16 10/27/2023 1133    CREATININE 0.73 10/27/2023 1133    Lab Results   Component Value Date/Time    CALCIUM 10.5 10/27/2023 1133    ALKPHOS 74 10/27/2023 1133    AST 20 10/27/2023 1133    ALT 16 10/27/2023 1133    BILITOT 0.8 10/27/2023 1133         Imaging:          Assessment/Plan    Ann Marie is an 88 yo woman with a history of T2N0 right IDC G-2 diagnosed August 2022. She is on neoadjuvant anastrozole with good tolerance. There is no evidence of recurrent disease on today's exam.     Plan:  Exam and mammogram are negative. We will now go to yearly mammograms.  Continue anastrozole 1mg daily.  Keep bone density appointment 5/1/25 @11:00 at [facility].   We reviewed signs/symptoms of recurrence including new masses, new pigmented lesion, tugging or pulling of the skin, nipple discharge, rash in or around the chest area, or any new finding that doesn't resolve within a 2-3  weeks.  All of Ann Marie's questions/concerns were addressed.  Over 15 minutes of time was spent with this patient with >50% of the time with education, counseling, and coordination of care.   I will see her back in July. She will call with any concerns.    Diagnoses and all orders for this visit:  Malignant neoplasm of upper-outer quadrant of right breast in female, estrogen receptor positive  -     Clinic Appointment Request Follow Up; ARIADNE ALCANTAR  Oncology follow-up encounter  Encounter for monitoring aromatase inhibitor therapy          Ariadne Alcantar, COOPER-CNP

## 2025-02-07 ENCOUNTER — TELEPHONE (OUTPATIENT)
Dept: PRIMARY CARE | Facility: CLINIC | Age: 89
End: 2025-02-07

## 2025-02-10 ENCOUNTER — APPOINTMENT (OUTPATIENT)
Dept: PRIMARY CARE | Facility: CLINIC | Age: 89
End: 2025-02-10
Payer: MEDICARE

## 2025-02-10 VITALS — BODY MASS INDEX: 24.22 KG/M2 | DIASTOLIC BLOOD PRESSURE: 74 MMHG | SYSTOLIC BLOOD PRESSURE: 124 MMHG | WEIGHT: 124 LBS

## 2025-02-10 DIAGNOSIS — M65.931 EXTENSOR TENOSYNOVITIS OF RIGHT WRIST: ICD-10-CM

## 2025-02-10 DIAGNOSIS — I10 BENIGN ESSENTIAL HYPERTENSION: ICD-10-CM

## 2025-02-10 DIAGNOSIS — G40.209 LOCALIZATION-RELATED (FOCAL) (PARTIAL) SYMPTOMATIC EPILEPSY AND EPILEPTIC SYNDROMES WITH COMPLEX PARTIAL SEIZURES, NOT INTRACTABLE, WITHOUT STATUS EPILEPTICUS: ICD-10-CM

## 2025-02-10 DIAGNOSIS — H91.93 BILATERAL HEARING LOSS, UNSPECIFIED HEARING LOSS TYPE: ICD-10-CM

## 2025-02-10 DIAGNOSIS — E78.2 COMBINED HYPERLIPIDEMIA: ICD-10-CM

## 2025-02-10 DIAGNOSIS — Z00.00 HEALTH CARE MAINTENANCE: Primary | ICD-10-CM

## 2025-02-10 PROCEDURE — G2211 COMPLEX E/M VISIT ADD ON: HCPCS | Performed by: INTERNAL MEDICINE

## 2025-02-10 PROCEDURE — 3074F SYST BP LT 130 MM HG: CPT | Performed by: INTERNAL MEDICINE

## 2025-02-10 PROCEDURE — 3078F DIAST BP <80 MM HG: CPT | Performed by: INTERNAL MEDICINE

## 2025-02-10 PROCEDURE — 99214 OFFICE O/P EST MOD 30 MIN: CPT | Performed by: INTERNAL MEDICINE

## 2025-02-10 RX ORDER — LEVETIRACETAM 500 MG/1
TABLET, EXTENDED RELEASE ORAL
Qty: 180 TABLET | Refills: 1 | Status: SHIPPED | OUTPATIENT
Start: 2025-02-10

## 2025-02-10 NOTE — PROGRESS NOTES
Subjective   Patient ID: Ann Marie Murphy is a 89 y.o. female who presents for No chief complaint on file..    HPI follow-up visit no chest pain no shortness of breath no side effect with medication has not had any seizures in about 30 years but recently he had run out of the Keppra medication was concerned has not been doing recently did develop breast cancer is undergoing treatment with a tablet having some muscle aches and pains especially in the right upper extremity with some swelling near the wrist she had lost some weight has been having difficulty with her hearing    Review of Systems    Objective   There were no vitals taken for this visit.    Physical Exam vital signs noted alert and oriented x 3 NCAT there are some memory issues repetitive speech she has a difficult time hearing EAC clear bilaterally some cerumen on the right TM normal opaque able to hear via bone-conduction greater than air conduction no JVD no lid lag no thyromegaly chest clear to auscultation cor regular rate and rhythm S1-S2 extremities no clubbing cyanosis or edema normal distal pulses no tremor DTR 2+ musculoskeletal right dorsal wrist somewhat swollen full range of motion    Assessment/Plan    impression arthritis and tenosynovitis of the right wrist hypertension hyperlipidemia seizure disorder other diagnoses hearing loss  Plan check comprehensive panel pleasant glucose potassium and kidney function as well as liver function refill the Keppra see EMR okay for audiology referral  Check CBC advised on blood count follow-up with neurology patient would make her own appointment discussed with family check lipid panel advised on cholesterol profile check TSH advised on thyroid good diet regular exercise care and safety in the home good water consumption advised on treatment for the wrist anti-inflammatory or other depending on the results of the current blood work and in addition check ESR advised on inflammation and follow-up more  regularly based on above TT 40 cc 21

## 2025-02-11 LAB
ALBUMIN SERPL-MCNC: 4.4 G/DL (ref 3.6–5.1)
ALP SERPL-CCNC: 63 U/L (ref 37–153)
ALT SERPL-CCNC: 15 U/L (ref 6–29)
ANION GAP SERPL CALCULATED.4IONS-SCNC: 11 MMOL/L (CALC) (ref 7–17)
AST SERPL-CCNC: 21 U/L (ref 10–35)
BILIRUB SERPL-MCNC: 0.8 MG/DL (ref 0.2–1.2)
BUN SERPL-MCNC: 17 MG/DL (ref 7–25)
CALCIUM SERPL-MCNC: 11 MG/DL (ref 8.6–10.4)
CHLORIDE SERPL-SCNC: 102 MMOL/L (ref 98–110)
CHOLEST SERPL-MCNC: 193 MG/DL
CHOLEST/HDLC SERPL: 2.2 (CALC)
CO2 SERPL-SCNC: 26 MMOL/L (ref 20–32)
CREAT SERPL-MCNC: 0.96 MG/DL (ref 0.6–0.95)
EGFRCR SERPLBLD CKD-EPI 2021: 57 ML/MIN/1.73M2
ERYTHROCYTE [DISTWIDTH] IN BLOOD BY AUTOMATED COUNT: 12.1 % (ref 11–15)
ERYTHROCYTE [SEDIMENTATION RATE] IN BLOOD BY WESTERGREN METHOD: 2 MM/H
GLUCOSE SERPL-MCNC: 115 MG/DL (ref 65–99)
HCT VFR BLD AUTO: 45.6 % (ref 35–45)
HDLC SERPL-MCNC: 86 MG/DL
HGB BLD-MCNC: 15.2 G/DL (ref 11.7–15.5)
LDLC SERPL CALC-MCNC: 82 MG/DL (CALC)
MCH RBC QN AUTO: 32.3 PG (ref 27–33)
MCHC RBC AUTO-ENTMCNC: 33.3 G/DL (ref 32–36)
MCV RBC AUTO: 96.8 FL (ref 80–100)
NONHDLC SERPL-MCNC: 107 MG/DL (CALC)
PLATELET # BLD AUTO: 205 THOUSAND/UL (ref 140–400)
PMV BLD REES-ECKER: 10.8 FL (ref 7.5–12.5)
POTASSIUM SERPL-SCNC: 4.5 MMOL/L (ref 3.5–5.3)
PROT SERPL-MCNC: 6.8 G/DL (ref 6.1–8.1)
RBC # BLD AUTO: 4.71 MILLION/UL (ref 3.8–5.1)
SODIUM SERPL-SCNC: 139 MMOL/L (ref 135–146)
TRIGL SERPL-MCNC: 150 MG/DL
TSH SERPL-ACNC: 1.78 MIU/L (ref 0.4–4.5)
WBC # BLD AUTO: 8.6 THOUSAND/UL (ref 3.8–10.8)

## 2025-02-20 ENCOUNTER — TELEPHONE (OUTPATIENT)
Dept: CARDIOLOGY | Facility: HOSPITAL | Age: 89
End: 2025-02-20

## 2025-02-20 ENCOUNTER — APPOINTMENT (OUTPATIENT)
Dept: PAIN MEDICINE | Facility: CLINIC | Age: 89
End: 2025-02-20
Payer: MEDICARE

## 2025-02-20 DIAGNOSIS — M47.816 OSTEOARTHRITIS OF LUMBAR SPINE, UNSPECIFIED SPINAL OSTEOARTHRITIS COMPLICATION STATUS: ICD-10-CM

## 2025-02-20 DIAGNOSIS — M54.16 LUMBAR RADICULOPATHY: ICD-10-CM

## 2025-02-20 DIAGNOSIS — Z79.899 ENCOUNTER FOR LONG-TERM (CURRENT) USE OF HIGH-RISK MEDICATION: Primary | ICD-10-CM

## 2025-02-20 DIAGNOSIS — I10 HYPERTENSION, UNSPECIFIED TYPE: ICD-10-CM

## 2025-02-20 PROCEDURE — 1159F MED LIST DOCD IN RCRD: CPT | Performed by: NURSE PRACTITIONER

## 2025-02-20 PROCEDURE — 99213 OFFICE O/P EST LOW 20 MIN: CPT | Performed by: NURSE PRACTITIONER

## 2025-02-20 PROCEDURE — 1036F TOBACCO NON-USER: CPT | Performed by: NURSE PRACTITIONER

## 2025-02-20 PROCEDURE — 1160F RVW MEDS BY RX/DR IN RCRD: CPT | Performed by: NURSE PRACTITIONER

## 2025-02-20 RX ORDER — TRAMADOL HYDROCHLORIDE 50 MG/1
50-100 TABLET ORAL 3 TIMES DAILY PRN
Qty: 180 TABLET | Refills: 0 | Status: SHIPPED | OUTPATIENT
Start: 2025-02-20 | End: 2025-03-22

## 2025-02-21 RX ORDER — VALSARTAN AND HYDROCHLOROTHIAZIDE 320; 25 MG/1; MG/1
1 TABLET, FILM COATED ORAL DAILY
Qty: 90 TABLET | Refills: 3 | Status: SHIPPED | OUTPATIENT
Start: 2025-02-21 | End: 2025-03-13 | Stop reason: ALTCHOICE

## 2025-03-06 ENCOUNTER — TELEPHONE (OUTPATIENT)
Dept: PRIMARY CARE | Facility: CLINIC | Age: 89
End: 2025-03-06
Payer: MEDICARE

## 2025-03-12 ENCOUNTER — TELEPHONE (OUTPATIENT)
Dept: PRIMARY CARE | Facility: CLINIC | Age: 89
End: 2025-03-12

## 2025-03-13 DIAGNOSIS — I10 BENIGN ESSENTIAL HYPERTENSION: Primary | ICD-10-CM

## 2025-03-13 DIAGNOSIS — Z00.00 HEALTH CARE MAINTENANCE: ICD-10-CM

## 2025-03-13 RX ORDER — VALSARTAN 320 MG/1
320 TABLET ORAL DAILY
Qty: 30 TABLET | Refills: 3 | Status: SHIPPED | OUTPATIENT
Start: 2025-03-13 | End: 2026-04-17

## 2025-03-13 RX ORDER — LEVETIRACETAM 500 MG/1
TABLET ORAL
Qty: 180 TABLET | Refills: 0 | Status: SHIPPED | OUTPATIENT
Start: 2025-03-13

## 2025-03-18 DIAGNOSIS — M54.16 LUMBAR RADICULOPATHY: ICD-10-CM

## 2025-03-18 RX ORDER — TRAMADOL HYDROCHLORIDE 50 MG/1
50-100 TABLET ORAL 3 TIMES DAILY PRN
Qty: 180 TABLET | Refills: 0 | Status: SHIPPED | OUTPATIENT
Start: 2025-03-22 | End: 2025-04-21

## 2025-04-10 ENCOUNTER — HOSPITAL ENCOUNTER (OUTPATIENT)
Dept: RADIOLOGY | Facility: CLINIC | Age: 89
Discharge: HOME | End: 2025-04-10
Payer: MEDICARE

## 2025-04-10 ENCOUNTER — APPOINTMENT (OUTPATIENT)
Dept: PRIMARY CARE | Facility: CLINIC | Age: 89
End: 2025-04-10
Payer: MEDICARE

## 2025-04-10 VITALS — DIASTOLIC BLOOD PRESSURE: 81 MMHG | SYSTOLIC BLOOD PRESSURE: 145 MMHG

## 2025-04-10 DIAGNOSIS — R41.89 COGNITIVE CHANGES: ICD-10-CM

## 2025-04-10 DIAGNOSIS — M54.50 LOW BACK PAIN WITH RADIATION: ICD-10-CM

## 2025-04-10 DIAGNOSIS — I10 BENIGN ESSENTIAL HYPERTENSION: ICD-10-CM

## 2025-04-10 DIAGNOSIS — Z00.00 HEALTH CARE MAINTENANCE: Primary | ICD-10-CM

## 2025-04-10 PROCEDURE — 72100 X-RAY EXAM L-S SPINE 2/3 VWS: CPT

## 2025-04-10 NOTE — PROGRESS NOTES
Subjective   Patient ID: Ann Marie Murphy is a 89 y.o. female who presents for No chief complaint on file..    HPI follow-up visit no chest pain no shortness of breath no side effect with medication has had some lower back discomfort she has become relatively harder of hearing and more forgetful over time there have been no falls prior results reviewed    Review of Systems    Objective   There were no vitals taken for this visit.    Physical Exam vital signs noted alert and oriented NCAT bilateral EAC's clear bilateral TMs opaque no air-fluid levels no AC nodes no JVD cervical spine full range of motion LS spine relatively straight nontender spinous process negative straight leg raise chest clear to auscultation CV regular rate and rhythm S1-S2 extremities no clubbing cyanosis or edema normal distal pulses DTR 1+ able to arise from chair and initiate gait    Assessment/Plan   {Assess/PlanSmartLinks:77445} impression low back pain with radiation feels her balance is off hypertension with electrolyte cognitive decline  Plan discussed with patient and family will obtain LS-spine film AP lateral requisition made will check renal panel advised on sugar sodium potassium kidney function and calcium continue with the current blood pressure medicine as is increase water consumption back hygiene back stretches Tylenol heating pad consider Lidoderm patch Endor physical therapy care and safety in the home in the home and then recheck based on above    LOV see prior

## 2025-04-11 LAB
ALBUMIN SERPL-MCNC: 4.4 G/DL (ref 3.6–5.1)
BUN SERPL-MCNC: 20 MG/DL (ref 7–25)
BUN/CREAT SERPL: ABNORMAL (CALC) (ref 6–22)
CALCIUM SERPL-MCNC: 10.7 MG/DL (ref 8.6–10.4)
CHLORIDE SERPL-SCNC: 102 MMOL/L (ref 98–110)
CO2 SERPL-SCNC: 26 MMOL/L (ref 20–32)
CREAT SERPL-MCNC: 0.81 MG/DL (ref 0.6–0.95)
EGFRCR SERPLBLD CKD-EPI 2021: 70 ML/MIN/1.73M2
GLUCOSE SERPL-MCNC: 95 MG/DL (ref 65–139)
PHOSPHATE SERPL-MCNC: 3.1 MG/DL (ref 2.1–4.3)
POTASSIUM SERPL-SCNC: 4.4 MMOL/L (ref 3.5–5.3)
SODIUM SERPL-SCNC: 139 MMOL/L (ref 135–146)

## 2025-04-16 ENCOUNTER — TELEMEDICINE (OUTPATIENT)
Dept: PRIMARY CARE | Facility: CLINIC | Age: 89
End: 2025-04-16
Payer: MEDICARE

## 2025-04-16 ENCOUNTER — TELEPHONE (OUTPATIENT)
Dept: PRIMARY CARE | Facility: CLINIC | Age: 89
End: 2025-04-16

## 2025-04-16 DIAGNOSIS — M41.9 SCOLIOSIS OF LUMBOSACRAL SPINE, UNSPECIFIED SCOLIOSIS TYPE: ICD-10-CM

## 2025-04-16 DIAGNOSIS — M47.896 OTHER OSTEOARTHRITIS OF SPINE, LUMBAR REGION: ICD-10-CM

## 2025-04-16 DIAGNOSIS — M54.50 LOW BACK PAIN WITH RADIATION: Primary | ICD-10-CM

## 2025-04-16 DIAGNOSIS — F69 BEHAVIOR CONCERN IN ADULT: ICD-10-CM

## 2025-04-16 PROCEDURE — 1157F ADVNC CARE PLAN IN RCRD: CPT | Performed by: INTERNAL MEDICINE

## 2025-04-16 PROCEDURE — 99213 OFFICE O/P EST LOW 20 MIN: CPT | Performed by: INTERNAL MEDICINE

## 2025-04-16 ASSESSMENT — ENCOUNTER SYMPTOMS
CONFUSION: 1
NEUROLOGIC COMPLAINT: 1
LOSS OF BALANCE: 1
ALTERED MENTAL STATUS: 1
ABDOMINAL PAIN: 1
HEADACHES: 0
FEVER: 0

## 2025-04-16 NOTE — TELEPHONE ENCOUNTER
Pt daughter Mary called in stating would like to know the results of her mother blood work and xray. Pt daughter also stated mother woke up very confused on Monday morning around 3 or 4 am. She is still having a little bit on confession but not as bad as Monday morning. Would like a call back to [phone]

## 2025-04-16 NOTE — PROGRESS NOTES
Subjective   Patient ID: Ann Marie Murphy is a 89 y.o. female who presents for No chief complaint on file..    HPI VVV    Review of Systems    Objective   There were no vitals taken for this visit.    Physical Exam    Assessment/Plan   {Assess/PlanSmartLinks:44736}     PT for back based on films

## 2025-04-19 ENCOUNTER — APPOINTMENT (OUTPATIENT)
Dept: RADIOLOGY | Facility: HOSPITAL | Age: 89
End: 2025-04-19
Payer: MEDICARE

## 2025-04-19 ENCOUNTER — HOSPITAL ENCOUNTER (INPATIENT)
Facility: HOSPITAL | Age: 89
End: 2025-04-19
Attending: GENERAL PRACTICE | Admitting: INTERNAL MEDICINE
Payer: MEDICARE

## 2025-04-19 DIAGNOSIS — R26.2 DIFFICULTY WALKING: ICD-10-CM

## 2025-04-19 DIAGNOSIS — R41.0 CONFUSION: Primary | ICD-10-CM

## 2025-04-19 LAB
ALBUMIN SERPL BCP-MCNC: 3.9 G/DL (ref 3.4–5)
ALP SERPL-CCNC: 97 U/L (ref 33–136)
ALT SERPL W P-5'-P-CCNC: 36 U/L (ref 7–45)
ANION GAP SERPL CALC-SCNC: 14 MMOL/L (ref 10–20)
APPEARANCE UR: CLEAR
AST SERPL W P-5'-P-CCNC: 59 U/L (ref 9–39)
BASOPHILS # BLD AUTO: 0.04 X10*3/UL (ref 0–0.1)
BASOPHILS NFR BLD AUTO: 0.5 %
BILIRUB SERPL-MCNC: 1.1 MG/DL (ref 0–1.2)
BILIRUB UR STRIP.AUTO-MCNC: NEGATIVE MG/DL
BUN SERPL-MCNC: 12 MG/DL (ref 6–23)
CALCIUM SERPL-MCNC: 10.4 MG/DL (ref 8.6–10.3)
CHLORIDE SERPL-SCNC: 109 MMOL/L (ref 98–107)
CO2 SERPL-SCNC: 24 MMOL/L (ref 21–32)
COLOR UR: YELLOW
CREAT SERPL-MCNC: 0.87 MG/DL (ref 0.5–1.05)
EGFRCR SERPLBLD CKD-EPI 2021: 64 ML/MIN/1.73M*2
EOSINOPHIL # BLD AUTO: 0.06 X10*3/UL (ref 0–0.4)
EOSINOPHIL NFR BLD AUTO: 0.7 %
ERYTHROCYTE [DISTWIDTH] IN BLOOD BY AUTOMATED COUNT: 12.9 % (ref 11.5–14.5)
FLUAV RNA RESP QL NAA+PROBE: NOT DETECTED
FLUBV RNA RESP QL NAA+PROBE: NOT DETECTED
GLUCOSE BLD MANUAL STRIP-MCNC: 150 MG/DL (ref 74–99)
GLUCOSE SERPL-MCNC: 128 MG/DL (ref 74–99)
GLUCOSE UR STRIP.AUTO-MCNC: NORMAL MG/DL
HCT VFR BLD AUTO: 41.5 % (ref 36–46)
HGB BLD-MCNC: 13.6 G/DL (ref 12–16)
IMM GRANULOCYTES # BLD AUTO: 0.03 X10*3/UL (ref 0–0.5)
IMM GRANULOCYTES NFR BLD AUTO: 0.3 % (ref 0–0.9)
KETONES UR STRIP.AUTO-MCNC: ABNORMAL MG/DL
LACTATE SERPL-SCNC: 2.1 MMOL/L (ref 0.4–2)
LEUKOCYTE ESTERASE UR QL STRIP.AUTO: NEGATIVE
LYMPHOCYTES # BLD AUTO: 1.66 X10*3/UL (ref 0.8–3)
LYMPHOCYTES NFR BLD AUTO: 19.3 %
MCH RBC QN AUTO: 31.2 PG (ref 26–34)
MCHC RBC AUTO-ENTMCNC: 32.8 G/DL (ref 32–36)
MCV RBC AUTO: 95 FL (ref 80–100)
MONOCYTES # BLD AUTO: 0.71 X10*3/UL (ref 0.05–0.8)
MONOCYTES NFR BLD AUTO: 8.2 %
NEUTROPHILS # BLD AUTO: 6.12 X10*3/UL (ref 1.6–5.5)
NEUTROPHILS NFR BLD AUTO: 71 %
NITRITE UR QL STRIP.AUTO: NEGATIVE
NRBC BLD-RTO: 0 /100 WBCS (ref 0–0)
PH UR STRIP.AUTO: 5.5 [PH]
PLATELET # BLD AUTO: 227 X10*3/UL (ref 150–450)
POTASSIUM SERPL-SCNC: 3.7 MMOL/L (ref 3.5–5.3)
PROT SERPL-MCNC: 6.3 G/DL (ref 6.4–8.2)
PROT UR STRIP.AUTO-MCNC: NEGATIVE MG/DL
RBC # BLD AUTO: 4.36 X10*6/UL (ref 4–5.2)
RBC # UR STRIP.AUTO: NEGATIVE MG/DL
SARS-COV-2 RNA RESP QL NAA+PROBE: NOT DETECTED
SODIUM SERPL-SCNC: 143 MMOL/L (ref 136–145)
SP GR UR STRIP.AUTO: 1.03
UROBILINOGEN UR STRIP.AUTO-MCNC: ABNORMAL MG/DL
WBC # BLD AUTO: 8.6 X10*3/UL (ref 4.4–11.3)

## 2025-04-19 PROCEDURE — 74177 CT ABD & PELVIS W/CONTRAST: CPT | Performed by: RADIOLOGY

## 2025-04-19 PROCEDURE — 85025 COMPLETE CBC W/AUTO DIFF WBC: CPT | Performed by: GENERAL PRACTICE

## 2025-04-19 PROCEDURE — 2500000001 HC RX 250 WO HCPCS SELF ADMINISTERED DRUGS (ALT 637 FOR MEDICARE OP): Performed by: INTERNAL MEDICINE

## 2025-04-19 PROCEDURE — 74177 CT ABD & PELVIS W/CONTRAST: CPT

## 2025-04-19 PROCEDURE — 36415 COLL VENOUS BLD VENIPUNCTURE: CPT | Performed by: GENERAL PRACTICE

## 2025-04-19 PROCEDURE — 1100000001 HC PRIVATE ROOM DAILY

## 2025-04-19 PROCEDURE — 70450 CT HEAD/BRAIN W/O DYE: CPT | Performed by: RADIOLOGY

## 2025-04-19 PROCEDURE — 80053 COMPREHEN METABOLIC PANEL: CPT | Performed by: GENERAL PRACTICE

## 2025-04-19 PROCEDURE — 70450 CT HEAD/BRAIN W/O DYE: CPT

## 2025-04-19 PROCEDURE — 87636 SARSCOV2 & INF A&B AMP PRB: CPT | Performed by: GENERAL PRACTICE

## 2025-04-19 PROCEDURE — 82947 ASSAY GLUCOSE BLOOD QUANT: CPT

## 2025-04-19 PROCEDURE — 2500000004 HC RX 250 GENERAL PHARMACY W/ HCPCS (ALT 636 FOR OP/ED): Performed by: INTERNAL MEDICINE

## 2025-04-19 PROCEDURE — 99285 EMERGENCY DEPT VISIT HI MDM: CPT | Mod: 25 | Performed by: GENERAL PRACTICE

## 2025-04-19 PROCEDURE — 2550000001 HC RX 255 CONTRASTS: Performed by: GENERAL PRACTICE

## 2025-04-19 PROCEDURE — 81003 URINALYSIS AUTO W/O SCOPE: CPT | Performed by: GENERAL PRACTICE

## 2025-04-19 PROCEDURE — 87040 BLOOD CULTURE FOR BACTERIA: CPT | Mod: AHULAB | Performed by: GENERAL PRACTICE

## 2025-04-19 PROCEDURE — 83605 ASSAY OF LACTIC ACID: CPT | Performed by: GENERAL PRACTICE

## 2025-04-19 RX ORDER — LEVETIRACETAM 500 MG/1
500 TABLET, EXTENDED RELEASE ORAL 2 TIMES DAILY
Status: DISCONTINUED | OUTPATIENT
Start: 2025-04-19 | End: 2025-04-24 | Stop reason: HOSPADM

## 2025-04-19 RX ORDER — ACETAMINOPHEN 325 MG/1
650 TABLET ORAL EVERY 4 HOURS PRN
Status: DISCONTINUED | OUTPATIENT
Start: 2025-04-19 | End: 2025-04-24 | Stop reason: HOSPADM

## 2025-04-19 RX ORDER — ONDANSETRON HYDROCHLORIDE 2 MG/ML
4 INJECTION, SOLUTION INTRAVENOUS EVERY 8 HOURS PRN
Status: DISCONTINUED | OUTPATIENT
Start: 2025-04-19 | End: 2025-04-24 | Stop reason: HOSPADM

## 2025-04-19 RX ORDER — ASPIRIN 81 MG/1
81 TABLET ORAL DAILY
Status: DISCONTINUED | OUTPATIENT
Start: 2025-04-19 | End: 2025-04-24 | Stop reason: HOSPADM

## 2025-04-19 RX ORDER — LEVETIRACETAM 500 MG/1
500 TABLET ORAL 2 TIMES DAILY
Status: DISCONTINUED | OUTPATIENT
Start: 2025-04-19 | End: 2025-04-19

## 2025-04-19 RX ORDER — CHOLECALCIFEROL (VITAMIN D3) 25 MCG
50 TABLET ORAL DAILY
Status: DISCONTINUED | OUTPATIENT
Start: 2025-04-19 | End: 2025-04-24 | Stop reason: HOSPADM

## 2025-04-19 RX ORDER — MIRTAZAPINE 15 MG/1
7.5 TABLET, FILM COATED ORAL NIGHTLY
Status: DISCONTINUED | OUTPATIENT
Start: 2025-04-19 | End: 2025-04-24 | Stop reason: HOSPADM

## 2025-04-19 RX ORDER — PANTOPRAZOLE SODIUM 40 MG/1
40 TABLET, DELAYED RELEASE ORAL
Status: DISCONTINUED | OUTPATIENT
Start: 2025-04-20 | End: 2025-04-24 | Stop reason: HOSPADM

## 2025-04-19 RX ORDER — ATORVASTATIN CALCIUM 10 MG/1
10 TABLET, FILM COATED ORAL NIGHTLY
Status: DISCONTINUED | OUTPATIENT
Start: 2025-04-19 | End: 2025-04-24 | Stop reason: HOSPADM

## 2025-04-19 RX ORDER — ONDANSETRON 4 MG/1
4 TABLET, FILM COATED ORAL EVERY 8 HOURS PRN
Status: DISCONTINUED | OUTPATIENT
Start: 2025-04-19 | End: 2025-04-24 | Stop reason: HOSPADM

## 2025-04-19 RX ORDER — ENOXAPARIN SODIUM 100 MG/ML
40 INJECTION SUBCUTANEOUS EVERY 24 HOURS
Status: DISCONTINUED | OUTPATIENT
Start: 2025-04-19 | End: 2025-04-24 | Stop reason: HOSPADM

## 2025-04-19 RX ORDER — ANASTROZOLE 1 MG/1
1 TABLET ORAL DAILY
Status: DISCONTINUED | OUTPATIENT
Start: 2025-04-19 | End: 2025-04-24 | Stop reason: HOSPADM

## 2025-04-19 RX ORDER — POLYETHYLENE GLYCOL 3350 17 G/17G
17 POWDER, FOR SOLUTION ORAL DAILY
Status: DISCONTINUED | OUTPATIENT
Start: 2025-04-19 | End: 2025-04-24 | Stop reason: HOSPADM

## 2025-04-19 RX ADMIN — ACETAMINOPHEN 650 MG: 325 TABLET ORAL at 17:02

## 2025-04-19 RX ADMIN — ENOXAPARIN SODIUM 40 MG: 40 INJECTION SUBCUTANEOUS at 17:02

## 2025-04-19 RX ADMIN — ANASTROZOLE 1 MG: 1 TABLET, COATED ORAL at 17:02

## 2025-04-19 RX ADMIN — IOHEXOL 75 ML: 350 INJECTION, SOLUTION INTRAVENOUS at 12:30

## 2025-04-19 RX ADMIN — LEVETIRACETAM 500 MG: 500 TABLET, FILM COATED, EXTENDED RELEASE ORAL at 22:46

## 2025-04-19 RX ADMIN — ATORVASTATIN CALCIUM 10 MG: 10 TABLET, FILM COATED ORAL at 22:46

## 2025-04-19 RX ADMIN — ASPIRIN 81 MG: 81 TABLET, COATED ORAL at 17:02

## 2025-04-19 RX ADMIN — Medication 50 MCG: at 17:02

## 2025-04-19 RX ADMIN — ACETAMINOPHEN 650 MG: 325 TABLET ORAL at 22:48

## 2025-04-19 RX ADMIN — MIRTAZAPINE 7.5 MG: 15 TABLET, FILM COATED ORAL at 22:46

## 2025-04-19 SDOH — SOCIAL STABILITY: SOCIAL INSECURITY: HAVE YOU HAD THOUGHTS OF HARMING ANYONE ELSE?: NO

## 2025-04-19 ASSESSMENT — COLUMBIA-SUICIDE SEVERITY RATING SCALE - C-SSRS
6. HAVE YOU EVER DONE ANYTHING, STARTED TO DO ANYTHING, OR PREPARED TO DO ANYTHING TO END YOUR LIFE?: NO
1. IN THE PAST MONTH, HAVE YOU WISHED YOU WERE DEAD OR WISHED YOU COULD GO TO SLEEP AND NOT WAKE UP?: NO
2. HAVE YOU ACTUALLY HAD ANY THOUGHTS OF KILLING YOURSELF?: NO

## 2025-04-19 ASSESSMENT — LIFESTYLE VARIABLES
AUDIT-C TOTAL SCORE: 0
AUDIT-C TOTAL SCORE: 0
HOW OFTEN DO YOU HAVE 6 OR MORE DRINKS ON ONE OCCASION: NEVER
SKIP TO QUESTIONS 9-10: 1
HOW MANY STANDARD DRINKS CONTAINING ALCOHOL DO YOU HAVE ON A TYPICAL DAY: PATIENT DOES NOT DRINK
HOW OFTEN DO YOU HAVE A DRINK CONTAINING ALCOHOL: NEVER

## 2025-04-19 ASSESSMENT — COGNITIVE AND FUNCTIONAL STATUS - GENERAL
DRESSING REGULAR UPPER BODY CLOTHING: A LITTLE
CLIMB 3 TO 5 STEPS WITH RAILING: A LOT
MOVING TO AND FROM BED TO CHAIR: A LITTLE
PATIENT BASELINE BEDBOUND: NO
TOILETING: A LITTLE
WALKING IN HOSPITAL ROOM: A LITTLE
DAILY ACTIVITIY SCORE: 20
DRESSING REGULAR LOWER BODY CLOTHING: A LITTLE
STANDING UP FROM CHAIR USING ARMS: A LITTLE
MOBILITY SCORE: 19
HELP NEEDED FOR BATHING: A LITTLE

## 2025-04-19 ASSESSMENT — ACTIVITIES OF DAILY LIVING (ADL)
FEEDING YOURSELF: INDEPENDENT
BATHING: INDEPENDENT
ASSISTIVE_DEVICE: WALKER
JUDGMENT_ADEQUATE_SAFELY_COMPLETE_DAILY_ACTIVITIES: NO
WALKS IN HOME: INDEPENDENT
PATIENT'S MEMORY ADEQUATE TO SAFELY COMPLETE DAILY ACTIVITIES?: NO
TOILETING: INDEPENDENT
ADEQUATE_TO_COMPLETE_ADL: YES
DRESSING YOURSELF: INDEPENDENT
HEARING - LEFT EAR: DIFFICULTY WITH NOISE
GROOMING: INDEPENDENT
HEARING - RIGHT EAR: DIFFICULTY WITH NOISE

## 2025-04-19 ASSESSMENT — PAIN SCALES - GENERAL
PAINLEVEL_OUTOF10: 7
PAINLEVEL_OUTOF10: 0 - NO PAIN

## 2025-04-19 ASSESSMENT — PATIENT HEALTH QUESTIONNAIRE - PHQ9
SUM OF ALL RESPONSES TO PHQ9 QUESTIONS 1 & 2: 0
1. LITTLE INTEREST OR PLEASURE IN DOING THINGS: NOT AT ALL
2. FEELING DOWN, DEPRESSED OR HOPELESS: NOT AT ALL

## 2025-04-19 ASSESSMENT — PAIN - FUNCTIONAL ASSESSMENT: PAIN_FUNCTIONAL_ASSESSMENT: 0-10

## 2025-04-19 ASSESSMENT — PAIN DESCRIPTION - LOCATION: LOCATION: BACK

## 2025-04-19 NOTE — ED TRIAGE NOTES
"Pt presents to the ED from home with c/o shakes and confusion. Pt states she has had  numbness in her R hand for 2 years. Pt states she feels like she is freaking herself out and she is shaky. Pt states she feels like something in her head is making her do certain things like walking faster, \"something is taking over her feelings\". Daughter states she has changed over the last 2-3 weeks, weakness/off balance, increased confusion in the last week as well.   "

## 2025-04-19 NOTE — ED PROVIDER NOTES
HPI   Chief Complaint   Patient presents with    Shaking       HPI: 89-year-old female with a history of HTN presents for tremulous activity and confusion.  For the past several weeks the patient has been acting gradually more confused and been generally weak and off balance.  This has been exacerbated over the past 3 days.  The patient and her daughter deny chest pain, fever, chills, abdominal pain.  She has not fallen      Limitations to history: None  Independent Historians: Patient, daughter  External Records Reviewed: [facility], outpatient notes, inpatient notes  ------------------------------------------------------------------------------------------------------------------------------------------  ROS: a ten point review of systems was performed and was negative except as per HPI.  ------------------------------------------------------------------------------------------------------------------------------------------  PMH / PSH: as per HPI, otherwise reviewed in EMR  MEDS: as per HPI, otherwise reviewed in EMR  ALLERGIES: as per HPI, otherwise reviewed in EMR  SocH:  as per HPI, otherwise reviewed in EMR  FH:  as per HPI, otherwise reviewed in EMR  ------------------------------------------------------------------------------------------------------------------------------------------  Physical Exam:  VS: As documented in the triage note and EMR flowsheet from this visit was reviewed  General: Well appearing. No acute distress.   Eyes:  Extraocular movements grossly intact. No scleral icterus. No discharge  HEENT:  Normocephalic.  Atraumatic  Neck: Moves neck freely. No gross masses  CV: Regular rhythm. No murmurs, rubs or gallops   Resp: Clear to auscultation bilaterally. No respiratory distress.    GI: Soft, no masses, nontender. No rebound tenderness or guarding  MSK: Symmetric muscle bulk. No deformities. No lower extremity edema.    Skin: Warm, dry, intact.   Neuro: No focal deficits.  A&O to person place  [facility]  Brief Discharge Note    Patient Name:  Raul Mcneil   MRN:  [id number]    :  1945   Admission Date:  2023   Discharge Date:  2023   Attending Physician: Nilton Redmond MD     Procedure:  PHACOEMULSIFICATION, CATARACT (Left)    OUTCOME: Patient tolerated procedure well without complication and is now ready for discharge.    DISPOSITION: Home or Self Care    FINAL DIAGNOSIS:  Age-related nuclear cataract, left eye                                       FOLLOWUP: 1 day in clinic    DISCHARGE INSTRUCTIONS:  Follow the post-op eye instructions given from the clinic.  Continue Prednisolone, ciprofloxacin, and ketorolac eye drops all 4 times a day.    and year but will occasionally make non sequitur statements  Psych: Seems confused but alert and conversive  ------------------------------------------------------------------------------------------------------------------------------------------  Hospital Course / Medical Decision Making:  Independent Interpretations: CT head  EKG as interpreted by me: N/A    MDM: 89-year-old female presents with tremulous activity and confusion.  CT head shows no acute intracranial process.  She is afebrile.  Urinalysis negative for UTI.  No major abnormalities on CBC or CMP.  The patient is reporting mild left lower quadrant pain and CT of the abdomen and pelvis shows no acute etiology for her pain.  There is cholelithiasis without cholecystitis.  Diverticulosis without diverticulitis.  She has mild aneurysmal dilation of the ascending aorta.  I conducted shared decision making and given the patient's increasing confusion and unsteadiness I do not feel that it is a safe discharge at this time.  Her daughter agrees with this.  The patient was admitted to the medicine service for further evaluation including PT/OT evaluation    Discussion of Management with Other Providers:   I discussed the patient/results with: Emergency medicine team    Final diagnosis and disposition as below.    Results for orders placed or performed during the hospital encounter of 04/19/25  -POCT GLUCOSE:   Collection Time: 04/19/25 11:13 AM       Result                      Value             Ref Range           POCT Glucose                150 (H)           74 - 99 mg/dL  -Blood Culture:   Collection Time: 04/19/25 11:34 AM  Specimen: Peripheral Venipuncture; Blood culture       Result                      Value             Ref Range           Blood Culture                                                 Loaded on Instrument - Culture in progress  -Blood Culture:   Collection Time: 04/19/25 11:34 AM  Specimen: Peripheral Venipuncture; Blood culture        Result                      Value             Ref Range           Blood Culture                                                 Loaded on Instrument - Culture in progress  -CBC and Auto Differential:   Collection Time: 04/19/25 11:34 AM       Result                      Value             Ref Range           WBC                         8.6               4.4 - 11.3 x*       nRBC                        0.0               0.0 - 0.0 /1*       RBC                         4.36              4.00 - 5.20 *       Hemoglobin                  13.6              12.0 - 16.0 *       Hematocrit                  41.5              36.0 - 46.0 %       MCV                         95                80 - 100 fL         MCH                         31.2              26.0 - 34.0 *       MCHC                        32.8              32.0 - 36.0 *       RDW                         12.9              11.5 - 14.5 %       Platelets                   227               150 - 450 x1*       Neutrophils %               71.0              40.0 - 80.0 %       Immature Granulocytes *     0.3               0.0 - 0.9 %         Lymphocytes %               19.3              13.0 - 44.0 %       Monocytes %                 8.2               2.0 - 10.0 %        Eosinophils %               0.7               0.0 - 6.0 %         Basophils %                 0.5               0.0 - 2.0 %         Neutrophils Absolute        6.12 (H)          1.60 - 5.50 *       Immature Granulocytes *     0.03              0.00 - 0.50 *       Lymphocytes Absolute        1.66              0.80 - 3.00 *       Monocytes Absolute          0.71              0.05 - 0.80 *       Eosinophils Absolute        0.06              0.00 - 0.40 *       Basophils Absolute          0.04              0.00 - 0.10 *  -Comprehensive Metabolic Panel:   Collection Time: 04/19/25 11:34 AM       Result                      Value             Ref Range           Glucose                     128 (H)           74 -  99 mg/dL       Sodium                      143               136 - 145 mm*       Potassium                   3.7               3.5 - 5.3 mm*       Chloride                    109 (H)           98 - 107 mmo*       Bicarbonate                 24                21 - 32 mmol*       Anion Gap                   14                10 - 20 mmol*       Urea Nitrogen               12                6 - 23 mg/dL        Creatinine                  0.87              0.50 - 1.05 *       eGFR                        64                >60 mL/min/1*       Calcium                     10.4 (H)          8.6 - 10.3 m*       Albumin                     3.9               3.4 - 5.0 g/*       Alkaline Phosphatase        97                33 - 136 U/L        Total Protein               6.3 (L)           6.4 - 8.2 g/*       AST                         59 (H)            9 - 39 U/L          Bilirubin, Total            1.1               0.0 - 1.2 mg*       ALT                         36                7 - 45 U/L     -Lactate:   Collection Time: 04/19/25 11:34 AM       Result                      Value             Ref Range           Lactate                     2.1 (H)           0.4 - 2.0 mm*  -Sars-CoV-2 and Influenza A/B PCR:   Collection Time: 04/19/25 11:35 AM       Result                      Value             Ref Range           Flu A Result                Not Detected      Not Detected        Flu B Result                Not Detected      Not Detected        Coronavirus 2019, PCR       Not Detected      Not Detected   -Urinalysis with Reflex Culture and Microscopic:   Collection Time: 04/19/25 11:35 AM       Result                      Value             Ref Range           Color, Urine                Yellow            Light-Yellow*       Appearance, Urine           Clear             Clear               Specific Gravity, Urine     1.026             1.005 - 1.035       pH, Urine                   5.5               5.0, 5.5, 6.*       Protein,  Urine              NEGATIVE          NEGATIVE, 10*       Glucose, Urine              Normal            Normal mg/dL        Blood, Urine                NEGATIVE          NEGATIVE mg/*       Ketones, Urine              TRACE (A)         NEGATIVE mg/*       Bilirubin, Urine            NEGATIVE          NEGATIVE mg/*       Urobilinogen, Urine         3 (1+) (A)        Normal mg/dL        Nitrite, Urine              NEGATIVE          NEGATIVE            Leukocyte Esterase, Ur*     NEGATIVE          NEGATIVE       CT head wo IV contrast   Final Result    Moderate age-related degenerative change appears progressed from    previous examination, but without acute findings.          Signed by: Dieter Levine 4/19/2025 1:07 PM    Dictation workstation:   [id number]     CT abdomen pelvis w IV contrast   Final Result    1.  Emphysema and fibrosis at the lung bases. Mild aneurysmal    dilatation of the ascending aorta measuring 4 cm.    2. Cholelithiasis.    3. Diverticulosis.    4. Hepatic hemangioma. Nonincarcerated small left inguinal hernia.    Otherwise no acute findings.          MACRO:    1. None          Signed by: Dieter Levine 4/19/2025 1:17 PM    Dictation workstation:   [id number]                   Patient History   Medical History[1]  Surgical History[2]  Family History[3]  Social History[4]    Physical Exam   ED Triage Vitals   Temp Heart Rate Resp BP   04/19/25 1110 04/19/25 1110 04/19/25 1110 04/19/25 1110   36.8 °C (98.2 °F) (!) 113 17 119/84      SpO2 Temp Source Heart Rate Source Patient Position   04/19/25 1110 04/19/25 1110 04/19/25 1200 04/19/25 1200   95 % Temporal Monitor Lying      BP Location FiO2 (%)     04/19/25 1200 --     Right arm        Physical Exam      ED Course & MDM   Diagnoses as of 04/19/25 1905   Confusion   Difficulty walking                 No data recorded     Courtney Coma Scale Score: 15 (04/19/25 1109 : Lazara Tovar, JOSEFA)                           Medical Decision  Making      Procedure  Procedures         [1]   Past Medical History:  Diagnosis Date    Anxiety disorder due to known physiological condition     Anxiety disorder due to a general medical condition    Breast cancer     Cardiomyopathy, unspecified     Cardiomyopathy    Diverticulosis of intestine, part unspecified, without perforation or abscess without bleeding     Diverticulosis    Encounter for screening mammogram for malignant neoplasm of breast     Visit for screening mammogram    Localization-related (focal) (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, not intractable, without status epilepticus     Complex partial seizure    Other conditions influencing health status     Ovarian Torsion On The Left    Other conditions influencing health status     Stroke syndrome    Other specified noninflammatory disorders of uterus     Fibrosis of uterus    Personal history of other diseases of the circulatory system     History of hypertension    Personal history of other diseases of the circulatory system     History of hypertrophic cardiomyopathy    Personal history of other diseases of the digestive system     History of cholelithiasis    Personal history of other diseases of the digestive system     History of hemorrhoids    Personal history of other diseases of the digestive system     History of hiatal hernia    Personal history of other diseases of the digestive system     History of esophageal reflux    Personal history of other diseases of the female genital tract 01/14/2020    History of breast pain    Personal history of other endocrine, nutritional and metabolic disease     History of hypercholesterolemia    Personal history of other endocrine, nutritional and metabolic disease 03/24/2016    History of Cushing's syndrome    Personal history of transient ischemic attack (TIA), and cerebral infarction without residual deficits     History of transient cerebral ischemia     Unspecified convulsions (Multi)     Generalized convulsive seizure   [2]   Past Surgical History:  Procedure Laterality Date    APPENDECTOMY  2014    Appendectomy    BREAST BIOPSY Right 2022    Invasive Ductal Carinoma    COLONOSCOPY  2014    Complete Colonoscopy    EXPLORATORY LAPAROTOMY  2014    Exploratory Laparotomy    HERNIA REPAIR  2014    Hernia Repair    HYSTERECTOMY  2014    Hysterectomy    OTHER SURGICAL HISTORY  2014    Upper Gastrointestinal Endoscopy (Therapeutic)    OTHER SURGICAL HISTORY  2014    Surgery    UMBILICAL HERNIA REPAIR  2014    Umbilical Hernia Repair   [3]   Family History  Problem Relation Name Age of Onset    Lung cancer Father      Other (Carcinoma of the pancreas) Sister      Kidney cancer Sister      Arrhythmia Brother          ALl 3 Brothers     Other (Parkinson disease) Brother      Prostate cancer Brother     [4]   Social History  Tobacco Use    Smoking status: Former     Types: Cigarettes    Smokeless tobacco: Never   Substance Use Topics    Alcohol use: Not Currently    Drug use: Not Currently        Ganesh Edawrd DO  25 1949

## 2025-04-20 ENCOUNTER — APPOINTMENT (OUTPATIENT)
Dept: RADIOLOGY | Facility: HOSPITAL | Age: 89
End: 2025-04-20
Payer: MEDICARE

## 2025-04-20 VITALS
DIASTOLIC BLOOD PRESSURE: 84 MMHG | BODY MASS INDEX: 23.56 KG/M2 | HEIGHT: 60 IN | RESPIRATION RATE: 18 BRPM | OXYGEN SATURATION: 96 % | HEART RATE: 114 BPM | TEMPERATURE: 98 F | SYSTOLIC BLOOD PRESSURE: 129 MMHG | WEIGHT: 120 LBS

## 2025-04-20 LAB
ANION GAP SERPL CALC-SCNC: 10 MMOL/L (ref 10–20)
BACTERIA BLD CULT: NORMAL
BACTERIA BLD CULT: NORMAL
BUN SERPL-MCNC: 10 MG/DL (ref 6–23)
CALCIUM SERPL-MCNC: 10.1 MG/DL (ref 8.6–10.3)
CHLORIDE SERPL-SCNC: 114 MMOL/L (ref 98–107)
CO2 SERPL-SCNC: 25 MMOL/L (ref 21–32)
CREAT SERPL-MCNC: 0.67 MG/DL (ref 0.5–1.05)
EGFRCR SERPLBLD CKD-EPI 2021: 84 ML/MIN/1.73M*2
ERYTHROCYTE [DISTWIDTH] IN BLOOD BY AUTOMATED COUNT: 13.2 % (ref 11.5–14.5)
GLUCOSE BLD MANUAL STRIP-MCNC: 161 MG/DL (ref 74–99)
GLUCOSE SERPL-MCNC: 140 MG/DL (ref 74–99)
HCT VFR BLD AUTO: 41.1 % (ref 36–46)
HGB BLD-MCNC: 13.8 G/DL (ref 12–16)
HOLD SPECIMEN: NORMAL
LEVETIRACETAM SERPL-MCNC: 10 UG/ML (ref 10–40)
MCH RBC QN AUTO: 32.2 PG (ref 26–34)
MCHC RBC AUTO-ENTMCNC: 33.6 G/DL (ref 32–36)
MCV RBC AUTO: 96 FL (ref 80–100)
NRBC BLD-RTO: 0 /100 WBCS (ref 0–0)
PLATELET # BLD AUTO: 210 X10*3/UL (ref 150–450)
POTASSIUM SERPL-SCNC: 3.4 MMOL/L (ref 3.5–5.3)
RBC # BLD AUTO: 4.28 X10*6/UL (ref 4–5.2)
SODIUM SERPL-SCNC: 146 MMOL/L (ref 136–145)
WBC # BLD AUTO: 6.7 X10*3/UL (ref 4.4–11.3)

## 2025-04-20 PROCEDURE — 36415 COLL VENOUS BLD VENIPUNCTURE: CPT | Performed by: PSYCHIATRY & NEUROLOGY

## 2025-04-20 PROCEDURE — 36415 COLL VENOUS BLD VENIPUNCTURE: CPT | Performed by: INTERNAL MEDICINE

## 2025-04-20 PROCEDURE — 99222 1ST HOSP IP/OBS MODERATE 55: CPT | Performed by: INTERNAL MEDICINE

## 2025-04-20 PROCEDURE — 80048 BASIC METABOLIC PNL TOTAL CA: CPT | Performed by: INTERNAL MEDICINE

## 2025-04-20 PROCEDURE — 2500000004 HC RX 250 GENERAL PHARMACY W/ HCPCS (ALT 636 FOR OP/ED): Mod: JZ | Performed by: INTERNAL MEDICINE

## 2025-04-20 PROCEDURE — 70551 MRI BRAIN STEM W/O DYE: CPT

## 2025-04-20 PROCEDURE — 70551 MRI BRAIN STEM W/O DYE: CPT | Performed by: RADIOLOGY

## 2025-04-20 PROCEDURE — 85027 COMPLETE CBC AUTOMATED: CPT | Performed by: INTERNAL MEDICINE

## 2025-04-20 PROCEDURE — 2500000001 HC RX 250 WO HCPCS SELF ADMINISTERED DRUGS (ALT 637 FOR MEDICARE OP): Performed by: INTERNAL MEDICINE

## 2025-04-20 PROCEDURE — 82947 ASSAY GLUCOSE BLOOD QUANT: CPT

## 2025-04-20 PROCEDURE — 80177 DRUG SCRN QUAN LEVETIRACETAM: CPT | Mod: AHULAB | Performed by: PSYCHIATRY & NEUROLOGY

## 2025-04-20 PROCEDURE — 99223 1ST HOSP IP/OBS HIGH 75: CPT | Performed by: PSYCHIATRY & NEUROLOGY

## 2025-04-20 PROCEDURE — 1100000001 HC PRIVATE ROOM DAILY

## 2025-04-20 RX ADMIN — LEVETIRACETAM 500 MG: 500 TABLET, FILM COATED, EXTENDED RELEASE ORAL at 20:31

## 2025-04-20 RX ADMIN — ACETAMINOPHEN 650 MG: 325 TABLET ORAL at 11:52

## 2025-04-20 RX ADMIN — ATORVASTATIN CALCIUM 10 MG: 10 TABLET, FILM COATED ORAL at 20:32

## 2025-04-20 RX ADMIN — ACETAMINOPHEN 650 MG: 325 TABLET ORAL at 20:32

## 2025-04-20 RX ADMIN — MIRTAZAPINE 7.5 MG: 15 TABLET, FILM COATED ORAL at 20:32

## 2025-04-20 RX ADMIN — ENOXAPARIN SODIUM 40 MG: 40 INJECTION SUBCUTANEOUS at 17:09

## 2025-04-20 ASSESSMENT — PAIN SCALES - GENERAL: PAINLEVEL_OUTOF10: 7

## 2025-04-20 ASSESSMENT — COGNITIVE AND FUNCTIONAL STATUS - GENERAL
TOILETING: A LITTLE
DAILY ACTIVITIY SCORE: 20
STANDING UP FROM CHAIR USING ARMS: A LITTLE
CLIMB 3 TO 5 STEPS WITH RAILING: A LITTLE
CLIMB 3 TO 5 STEPS WITH RAILING: A LITTLE
HELP NEEDED FOR BATHING: A LITTLE
DRESSING REGULAR LOWER BODY CLOTHING: A LITTLE
HELP NEEDED FOR BATHING: A LITTLE
MOBILITY SCORE: 20
TOILETING: A LITTLE
DRESSING REGULAR UPPER BODY CLOTHING: A LITTLE
DRESSING REGULAR LOWER BODY CLOTHING: A LITTLE
DRESSING REGULAR UPPER BODY CLOTHING: A LITTLE
WALKING IN HOSPITAL ROOM: A LITTLE
MOVING TO AND FROM BED TO CHAIR: A LITTLE
MOVING TO AND FROM BED TO CHAIR: A LITTLE
WALKING IN HOSPITAL ROOM: A LITTLE
STANDING UP FROM CHAIR USING ARMS: A LITTLE
MOBILITY SCORE: 20
DAILY ACTIVITIY SCORE: 20

## 2025-04-20 ASSESSMENT — PAIN - FUNCTIONAL ASSESSMENT: PAIN_FUNCTIONAL_ASSESSMENT: 0-10

## 2025-04-20 NOTE — CARE PLAN
The clinical goals for the shift include Patient will remain injury free this shift.      Problem: Fall/Injury  Goal: Not fall by end of shift  Outcome: Progressing  Goal: Be free from injury by end of the shift  Outcome: Progressing  Goal: Verbalize understanding of personal risk factors for fall in the hospital  Outcome: Progressing  Goal: Verbalize understanding of risk factor reduction measures to prevent injury from fall in the home  Outcome: Progressing     Problem: Pain - Adult  Goal: Verbalizes/displays adequate comfort level or baseline comfort level  Outcome: Progressing     Problem: Safety - Adult  Goal: Free from fall injury  Outcome: Progressing     Problem: Nutrition  Goal: Nutrient intake appropriate for maintaining nutritional needs  Outcome: Progressing     Problem: Mobility  Goal: Maintains or increases mobility and physical activity  Outcome: Progressing     Problem: Sleep  Goal: Patient obtains adequate amount of sleep  Outcome: Progressing     Problem: Agitation  Goal: Patient experiences decreased agitation  Outcome: Progressing     Problem: Hydration  Goal: Patient maintains adequate hydration  Outcome: Progressing

## 2025-04-20 NOTE — CONSULTS
INITIAL NEUROLOGY CONSULT NOTE    IMPRESSION:  Left hemiataxia worse in the leg suggestive of stroke.  Recent onset encephalopathy of uncertain etiology.  I cannot absolutely rule out dementia with psychosis, but the onset for this cognitive change seems abrupt.  She is actively prescribed tramadol, so withdrawal is unlikely, but I cannot rule out that she is taking extra.  She appears to have a prior history of seizure and is on levetiracetam.  Labs don't indicate an acute infectious or metabolic process.    RECOMMENDATIONS:  Cranial MRI to rule out acute pathology, given left ataxia.  ECASA 81 mg daily for stroke prophylaxis.  I added a levetiracetam level to prior bloodwork to rule out intoxication.  Needs an antipsychotic, but is currently refusing meds.  May benefit from low-dose IM Zyprexa.  Definitely needs Psychiatry consultation.  Ideally, would benefit from an Addiction Medicine consultation regarding tramadol withdrawal, if [place] [facility] [place]  Will sign out to the next neurologist in the rotation.    Heriberto Holliday Jr., M.D., FAAN       History Of Present Illness  Ann Marie Murphy is a 89 y.o. female presenting with altered mentation.  History comes from the patient's daughter, with whom I spoke by phone, and from EMR review as the patient is a limited historian.    In the last two weeks, she has been more confused than usual.  For example, she was forgetting when her appointments were.  Usually, she keeps a calendar and is organized, pays her own bills, and is able to communicate well.  She has been more imbalanced in the last two weeks.  She has also been increasingly paranoid in the last week, and having visual and auditory hallucinations.    In the last four days, she didn't know how to use the remote control, thought the remote was her phone.    In the last two days, she was tremulous, as if she was having chills.  No convulsions noted.  To her daughter's knowledge, no  "falls.  No new medications recently.    Has been taking tramadol for 15-20 years, even though she tells her family hasn't seen any benefit, for chronic low back pain.  She is currently taking 100 mg tid.  It's not clear whether she is taking extra at times.    She was brought to the [facility] [place] ED yesterday by her family because of worsening confusion and the tremor.  I observed the patient decline medication and lab draws this morning before I came in for my exam.    No alcohol use.    The patient herself currently denies other focal neurological symptoms including dysarthria, dysphagia, diplopia, focal weakness, focal sensory change, ataxia, vertigo, or bowel/bladder incontinence, among others.  She does complain of abdominal pain and low back pain; the latter is a chronic issue.    Past Medical History  Medical History[1]  Surgical History  Surgical History[2]  Social History  Social History[3]    Scheduled medications  Scheduled Medications[4]  Continuous medications  Continuous Medications[5]  PRN medications  PRN Medications[6]      Family History[7]  Allergies  Adhesive tape-silicones, Cortisone, Diphenhydramine, Latex, and Morphine  Prescriptions Prior to Admission[8]    Scheduled medications  Scheduled Medications[9]  Continuous medications  Continuous Medications[10]  PRN medications  PRN Medications[11]    Review of Systems    Last Recorded Vitals  Blood pressure (!) 135/92, pulse (!) 128, temperature 36.6 °C (97.9 °F), temperature source Temporal, resp. rate 18, height 1.524 m (5'), weight 54.4 kg (120 lb), SpO2 96%.    CONSTITUTIONAL:  No acute distress    CARDIOVASCULAR:  Normal pulses in the distal legs, no edema of either arm or either leg.  No carotid bruits.    MENTAL STATUS:  Awake, alert, oriented to self, \"hospital\", but not to time, with poor short-term memory (0/3 5-minute recall), poor awareness of recent events, reduced attention span, concentration, and fund of knowledge.  She is cooperative for " "me, but is clearly confused with some commands.  She frequently tells me \"I need more time\" when asked very simple \"yes-no\" questions.    SPEECH AND LANGUAGE:  Can name and repeat, follows all commands, has no dysarthria    FUNDOSCOPIC:  No papilledema    CRANIAL NERVES:  II-Vision present, visual fields full to confrontational testing    III/IV/VI--EOMs are present in all directions.  Pupils are symmetrically reactive in dim light.  No ptosis.    V--Normal facial sensation.    VII--No facial asymmetry.    VIII--Hearing present to voice bilaterally, though reduced enough that I have to speak loudly.    IX/X--Symmetric soft palate rise.    XI--Normal trapezius power bilaterally.    XII--Tongue protrudes without deviation.    MOTOR:  Normal power, tone, and bulk in both arms and both legs.  No involuntary movements at this time.    SENSORY:  Present pin sensation in both arms and both legs, but her confusion does not let me determine whether she has distal-proximal gradient, asymmetry, or spinal sensory level.    COORDINATION:  Left hemiataxia, more in the leg than the arm.  Normal finger-to-nose and heel-to-shin testing in the right limbs.    REFLEXES are normal and symmetric at the biceps, triceps, brachioradialis, patella, and ankle.  The plantar responses are flexor.    GAIT is abnormal in that she is mildly imbalanced, requiring mild assist of one to ambulate.  She is without steppage, wide-based ataxia, shuffling, or spasticity.    Relevant Results  Results for orders placed or performed during the hospital encounter of 04/19/25 (from the past 24 hours)   POCT GLUCOSE   Result Value Ref Range    POCT Glucose 150 (H) 74 - 99 mg/dL   CBC and Auto Differential   Result Value Ref Range    WBC 8.6 4.4 - 11.3 x10*3/uL    nRBC 0.0 0.0 - 0.0 /100 WBCs    RBC 4.36 4.00 - 5.20 x10*6/uL    Hemoglobin 13.6 12.0 - 16.0 g/dL    Hematocrit 41.5 36.0 - 46.0 %    MCV 95 80 - 100 fL    MCH 31.2 26.0 - 34.0 pg    MCHC 32.8 32.0 - " 36.0 g/dL    RDW 12.9 11.5 - 14.5 %    Platelets 227 150 - 450 x10*3/uL    Neutrophils % 71.0 40.0 - 80.0 %    Immature Granulocytes %, Automated 0.3 0.0 - 0.9 %    Lymphocytes % 19.3 13.0 - 44.0 %    Monocytes % 8.2 2.0 - 10.0 %    Eosinophils % 0.7 0.0 - 6.0 %    Basophils % 0.5 0.0 - 2.0 %    Neutrophils Absolute 6.12 (H) 1.60 - 5.50 x10*3/uL    Immature Granulocytes Absolute, Automated 0.03 0.00 - 0.50 x10*3/uL    Lymphocytes Absolute 1.66 0.80 - 3.00 x10*3/uL    Monocytes Absolute 0.71 0.05 - 0.80 x10*3/uL    Eosinophils Absolute 0.06 0.00 - 0.40 x10*3/uL    Basophils Absolute 0.04 0.00 - 0.10 x10*3/uL   Comprehensive Metabolic Panel   Result Value Ref Range    Glucose 128 (H) 74 - 99 mg/dL    Sodium 143 136 - 145 mmol/L    Potassium 3.7 3.5 - 5.3 mmol/L    Chloride 109 (H) 98 - 107 mmol/L    Bicarbonate 24 21 - 32 mmol/L    Anion Gap 14 10 - 20 mmol/L    Urea Nitrogen 12 6 - 23 mg/dL    Creatinine 0.87 0.50 - 1.05 mg/dL    eGFR 64 >60 mL/min/1.73m*2    Calcium 10.4 (H) 8.6 - 10.3 mg/dL    Albumin 3.9 3.4 - 5.0 g/dL    Alkaline Phosphatase 97 33 - 136 U/L    Total Protein 6.3 (L) 6.4 - 8.2 g/dL    AST 59 (H) 9 - 39 U/L    Bilirubin, Total 1.1 0.0 - 1.2 mg/dL    ALT 36 7 - 45 U/L   Lactate   Result Value Ref Range    Lactate 2.1 (H) 0.4 - 2.0 mmol/L   Blood Culture    Specimen: Peripheral Venipuncture; Blood culture   Result Value Ref Range    Blood Culture Loaded on Instrument - Culture in progress    Blood Culture    Specimen: Peripheral Venipuncture; Blood culture   Result Value Ref Range    Blood Culture Loaded on Instrument - Culture in progress    Sars-CoV-2 and Influenza A/B PCR   Result Value Ref Range    Flu A Result Not Detected Not Detected    Flu B Result Not Detected Not Detected    Coronavirus 2019, PCR Not Detected Not Detected   Urinalysis with Reflex Culture and Microscopic   Result Value Ref Range    Color, Urine Yellow Light-Yellow, Yellow, Dark-Yellow    Appearance, Urine Clear Clear     Specific Gravity, Urine 1.026 1.005 - 1.035    pH, Urine 5.5 5.0, 5.5, 6.0, 6.5, 7.0, 7.5, 8.0    Protein, Urine NEGATIVE NEGATIVE, 10 (TRACE), 20 (TRACE) mg/dL    Glucose, Urine Normal Normal mg/dL    Blood, Urine NEGATIVE NEGATIVE mg/dL    Ketones, Urine TRACE (A) NEGATIVE mg/dL    Bilirubin, Urine NEGATIVE NEGATIVE mg/dL    Urobilinogen, Urine 3 (1+) (A) Normal mg/dL    Nitrite, Urine NEGATIVE NEGATIVE    Leukocyte Esterase, Urine NEGATIVE NEGATIVE         I have personally reviewed the following imaging results:  CT head wo IV contrast  Result Date: 4/19/2025  Interpreted By:  Dieter Levine, STUDY: CT HEAD WO IV CONTRAST;  4/19/2025 12:46 pm   INDICATION: Signs/Symptoms:Worsening confusion x 2 weeks, unsteady on feet.   COMPARISON: 10/24/2021   ACCESSION NUMBER(S): [id number]   ORDERING CLINICIAN: JJ ROWLEY   TECHNIQUE: Sequential trans axial images were obtained  .   FINDINGS: INTRACRANIAL:   There is moderate prominence of the cortical sulci indicating atrophy.   There is moderate ventriculomegaly, again consistent with atrophy.   Moderate decreased attenuation of the periventricular and long tracks of the white matter most consistent with gliosis from arterial disease. There is no evidence of definite subacute infarction, intracranial hemorrhage or mass.     EXTRACRANIAL: Moderate mucosal thickening of the right maxillary air cell with 1.4 cm nodularity indicating a polyp or mucous retention cyst. The calvarium is intact.       Moderate age-related degenerative change appears progressed from previous examination, but without acute findings.   Signed by: Dieter Levine 4/19/2025 1:07 PM Dictation workstation:   [id number]              Heriberto Holliday Jr., M.D., FAAN         [1]   Past Medical History:  Diagnosis Date    Anxiety disorder due to known physiological condition     Anxiety disorder due to a general medical condition    Breast cancer     Cardiomyopathy, unspecified     Cardiomyopathy     Diverticulosis of intestine, part unspecified, without perforation or abscess without bleeding     Diverticulosis    Encounter for screening mammogram for malignant neoplasm of breast     Visit for screening mammogram    Localization-related (focal) (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, not intractable, without status epilepticus     Complex partial seizure    Other conditions influencing health status     Ovarian Torsion On The Left    Other conditions influencing health status     Stroke syndrome    Other specified noninflammatory disorders of uterus     Fibrosis of uterus    Personal history of other diseases of the circulatory system     History of hypertension    Personal history of other diseases of the circulatory system     History of hypertrophic cardiomyopathy    Personal history of other diseases of the digestive system     History of cholelithiasis    Personal history of other diseases of the digestive system     History of hemorrhoids    Personal history of other diseases of the digestive system     History of hiatal hernia    Personal history of other diseases of the digestive system     History of esophageal reflux    Personal history of other diseases of the female genital tract 01/14/2020    History of breast pain    Personal history of other endocrine, nutritional and metabolic disease     History of hypercholesterolemia    Personal history of other endocrine, nutritional and metabolic disease 03/24/2016    History of Cushing's syndrome    Personal history of transient ischemic attack (TIA), and cerebral infarction without residual deficits     History of transient cerebral ischemia    Unspecified convulsions (Multi)     Generalized convulsive seizure   [2]   Past Surgical History:  Procedure Laterality Date    APPENDECTOMY  01/02/2014    Appendectomy    BREAST BIOPSY Right 08/05/2022    Invasive Ductal Carinoma    COLONOSCOPY  01/02/2014    Complete  Colonoscopy    EXPLORATORY LAPAROTOMY  2014    Exploratory Laparotomy    HERNIA REPAIR  2014    Hernia Repair    HYSTERECTOMY  2014    Hysterectomy    OTHER SURGICAL HISTORY  2014    Upper Gastrointestinal Endoscopy (Therapeutic)    OTHER SURGICAL HISTORY  2014    Surgery    UMBILICAL HERNIA REPAIR  2014    Umbilical Hernia Repair   [3]   Social History  Tobacco Use    Smoking status: Former     Types: Cigarettes    Smokeless tobacco: Never   Substance Use Topics    Alcohol use: Not Currently    Drug use: Not Currently   [4] anastrozole, 1 mg, oral, Daily  aspirin, 81 mg, oral, Daily  atorvastatin, 10 mg, oral, Nightly  cholecalciferol, 50 mcg, oral, Daily  enoxaparin, 40 mg, subcutaneous, q24h  levETIRAcetam XR, 500 mg, oral, BID  mirtazapine, 7.5 mg, oral, Nightly  pantoprazole, 40 mg, oral, Daily before breakfast  polyethylene glycol, 17 g, oral, Daily  [5]    [6] PRN medications: acetaminophen, ondansetron **OR** ondansetron  [7]   Family History  Problem Relation Name Age of Onset    Lung cancer Father      Other (Carcinoma of the pancreas) Sister      Kidney cancer Sister      Arrhythmia Brother          ALl 3 Brothers     Other (Parkinson disease) Brother      Prostate cancer Brother     [8]   Medications Prior to Admission   Medication Sig Dispense Refill Last Dose/Taking    acetaminophen (Tylenol) 325 mg tablet Take 2 tablets (650 mg) by mouth every 4 hours if needed for mild pain (1 - 3) or moderate pain (4 - 6).       anastrozole (Arimidex) 1 mg tablet Take 1 tablet (1 mg total) by mouth once daily. 30 tablet 11     ascorbic acid, vitamin C, 500 mg capsule Take 1 capsule by mouth once daily.       aspirin 81 mg EC tablet Take 1 tablet (81 mg) by mouth once daily.       atorvastatin (Lipitor) 10 mg tablet Take 1 tablet (10 mg) by mouth once daily at bedtime. 30 tablet 3     cholecalciferol (Vitamin D-3) 50 MCG (2000 UT) tablet Take 1 tablet (2,000 Units) by mouth  once daily.       famotidine (Pepcid) 20 mg tablet Take 1 tablet (20 mg) by mouth once daily.       fluticasone (Flonase Allergy Relief) 50 mcg/actuation nasal spray Administer 2 sprays into each nostril once daily as needed for rhinitis or allergies.       Lactobacillus acidophilus capsule Take 1 capsule by mouth once daily.       levETIRAcetam (Keppra) 500 mg tablet TAKE 2 TABLETS BY MOUTH DAILY 180 tablet 0     levETIRAcetam XR (Keppra XR) 500 mg 24 hr tablet TAKE 2 TABLETS(1000 MG) BY MOUTH EVERY  tablet 0     levETIRAcetam XR (Keppra XR) 500 mg 24 hr tablet do not crush, chew, or split.TAKE 2 TABLETS(1000 MG) BY MOUTH EVERY  tablet 1     metoprolol succinate XL (Toprol-XL) 100 mg 24 hr tablet TAKE 1 TABLET(100 MG) BY MOUTH EVERY DAY 90 tablet 1     midodrine (Proamatine) 10 mg tablet Per instructions AS NEEDED (route: oral)       mirtazapine (Remeron) 15 mg tablet Take 0.5 tablets (7.5 mg) by mouth once daily at bedtime.       naloxone (Narcan) 4 mg/0.1 mL nasal spray Administer 1 spray (4 mg) into affected nostril(s) if needed for opioid reversal or respiratory depression.       RABEprazole (Aciphex) EC tablet Take 1 tablet (20 mg) by mouth once daily.       traMADol (Ultram) 50 mg tablet Take 1-2 tablets ( mg) by mouth 3 times a day as needed for severe pain (7 - 10). Do not fill before March 22, 2025. 180 tablet 0     valsartan (Diovan) 320 mg tablet Take 1 tablet (320 mg) by mouth once daily. 30 tablet 3    [9] anastrozole, 1 mg, oral, Daily  aspirin, 81 mg, oral, Daily  atorvastatin, 10 mg, oral, Nightly  cholecalciferol, 50 mcg, oral, Daily  enoxaparin, 40 mg, subcutaneous, q24h  levETIRAcetam XR, 500 mg, oral, BID  mirtazapine, 7.5 mg, oral, Nightly  pantoprazole, 40 mg, oral, Daily before breakfast  polyethylene glycol, 17 g, oral, Daily  [10]    [11] PRN medications: acetaminophen, ondansetron **OR** ondansetron

## 2025-04-20 NOTE — H&P
History Of Present Illness  Ann Marie Murphy is a 89 y.o. female presenting with generalized weakness and wobbly on her feet and almost fell..    89-year-old lady who lives with her daughter is admitted with intermittent confusion, wobbly gait and hemiataxia and and shaking.  Patient has been taking tramadol for chronic pain about 3 tablets a day for 80 mg each.  When I saw her general condition is improved her mentation has improved significantly and she was able to tell me her name day and date and remembered that she is in the hospital and she is here because she was not able to walk very well.  She has been seen by neurology who felt that patient has hemiataxia and need further investigation in the form of MRI of the brain.  She does have a history of seizures and has been taking Keppra.  She denies any chest pain or shortness of breath.  Because of imbalance and equilibrium she did fall at home have some bruises over the anterior aspect of the shin.  No head injury.  Overall clinical condition confusion improved since admission.     Past Medical History  She has a past medical history of Anxiety disorder due to known physiological condition, Breast cancer, Cardiomyopathy, unspecified, Diverticulosis of intestine, part unspecified, without perforation or abscess without bleeding, Encounter for screening mammogram for malignant neoplasm of breast, Localization-related (focal) (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, not intractable, without status epilepticus, Other conditions influencing health status, Other conditions influencing health status, Other specified noninflammatory disorders of uterus, Personal history of other diseases of the circulatory system, Personal history of other diseases of the circulatory system, Personal history of other diseases of the digestive system, Personal history of other diseases of the digestive system, Personal history of other  diseases of the digestive system, Personal history of other diseases of the digestive system, Personal history of other diseases of the female genital tract (01/14/2020), Personal history of other endocrine, nutritional and metabolic disease, Personal history of other endocrine, nutritional and metabolic disease (03/24/2016), Personal history of transient ischemic attack (TIA), and cerebral infarction without residual deficits, and Unspecified convulsions (Multi).    Surgical History  She has a past surgical history that includes Colonoscopy (01/02/2014); Exploratory laparotomy (01/02/2014); Appendectomy (01/02/2014); Umbilical hernia repair (01/02/2014); Hysterectomy (01/02/2014); Hernia repair (01/02/2014); Other surgical history (01/02/2014); Other surgical history (01/02/2014); and Breast biopsy (Right, 08/05/2022).     Social History  She reports that she has quit smoking. Her smoking use included cigarettes. She has never used smokeless tobacco. She reports that she does not currently use alcohol. She reports that she does not currently use drugs.    Family History  Family History[1]     Allergies  Adhesive tape-silicones, Cortisone, Diphenhydramine, Latex, and Morphine    Review of Systems  COMPLETE REVIEW OF SYSTEMS:    GENERAL: No weight loss, positive for general malaise ,no fevers., SEE HPI  HEENT: Negative for frequent or significant headaches, No changes in hearing or vision, no nose bleeds or other nasal problems  NECK: Negative for lumps, goiter, pain and significant neck swelling  RESPIRATORY: Negative for cough, wheezing or shortness of breath.  Breast previous history of breast carcinoma in remission  CARDIOVASCULAR: Patient has history of cardiomyopathy but no evidence of overt heart failure,  negative for chest pain, leg swelling or palpitations.  GI: Negative for abdominal discomfort, blood in stools or black stools or change in bowel habits  : No history of dysuria, frequency or  incontinence  GYN: Negative for abnormal vaginal bleeding, abnormal vaginal discharge.   MUSCULOSKELETAL: Positive for chronic joint pain , back pain and has been taking tramadol for last 15 years.    SKIN: Negative for lesions, rash, and itching.  PSYCH: Negative for sleep disturbance, mood disorder and recent psychosocial stressors.  HEMATOLOGY/LYMPHOLOGY: Negative for prolonged bleeding, bruising easily or swollen nodes.  ENDOCRINE: Negative for cold or heat intolerance, polyuria, polydipsia and goiter.  NEURO: Positive for seizures, and unstable gait and wobbly on her feet, intermittent confusion, no history of headaches, syncope.     Physical Exam  GENERAL:  awake more alert today, moderate distress, cooperative, afebrile  SKIN: Skin color, texture, turgor normal. No rashes or lesions.  HEAD/SINUSES: No significant findings.  EYES: PERRLA and EOMI  EARS: external ears normal.  NOSE:  Septum midline.  OROPHARYNX: Lips, mucosa, and tongue normal. Teeth and gums normal. Oropharynx normal.  NECK: no jugulovenous distention, no carotid bruits, carotid pulse normal contour, supple  BACK:  No CVAT.  LUNGS: Lungs clear to auscultation. Good diaphragmatic excursion.  CARDIAC: Rate and rhythm is regular, normal S1 and S2; no rubs, or gallops, 2/6 systolic ejection murmur left sternal border, no edema and no CHF  ABDOMEN: Abdomen soft, non-tender. BS normal. No masses or organomegaly.  EXTREMITIES: Extremities normal. No deformities, edema, clubbing or skin discoloration.  NEURO: Please see detailed neurological examination done by Dr. Holliday the neurologist, patient has left hemiataxia, tendon reflexes are normal and plantars are downgoing.  Gait was not examined.  PULSES: 2+ radial, 2+ carotid  RECTAL: not done    Last Recorded Vitals  /77 (BP Location: Left arm, Patient Position: Lying)   Pulse (!) 112   Temp 36.6 °C (97.9 °F) (Temporal)   Resp 18   Wt 54.4 kg (120 lb)   SpO2 97%     Relevant  Results  Scheduled medications  anastrozole, 1 mg, oral, Daily  aspirin, 81 mg, oral, Daily  atorvastatin, 10 mg, oral, Nightly  cholecalciferol, 50 mcg, oral, Daily  enoxaparin, 40 mg, subcutaneous, q24h  levETIRAcetam XR, 500 mg, oral, BID  mirtazapine, 7.5 mg, oral, Nightly  pantoprazole, 40 mg, oral, Daily before breakfast  polyethylene glycol, 17 g, oral, Daily    Continuous medications  Continuous Medications[2]  PRN medications  PRN Medications[3]      Results for orders placed or performed during the hospital encounter of 04/19/25 (from the past 96 hours)   POCT GLUCOSE   Result Value Ref Range    POCT Glucose 150 (H) 74 - 99 mg/dL   CBC and Auto Differential   Result Value Ref Range    WBC 8.6 4.4 - 11.3 x10*3/uL    nRBC 0.0 0.0 - 0.0 /100 WBCs    RBC 4.36 4.00 - 5.20 x10*6/uL    Hemoglobin 13.6 12.0 - 16.0 g/dL    Hematocrit 41.5 36.0 - 46.0 %    MCV 95 80 - 100 fL    MCH 31.2 26.0 - 34.0 pg    MCHC 32.8 32.0 - 36.0 g/dL    RDW 12.9 11.5 - 14.5 %    Platelets 227 150 - 450 x10*3/uL    Neutrophils % 71.0 40.0 - 80.0 %    Immature Granulocytes %, Automated 0.3 0.0 - 0.9 %    Lymphocytes % 19.3 13.0 - 44.0 %    Monocytes % 8.2 2.0 - 10.0 %    Eosinophils % 0.7 0.0 - 6.0 %    Basophils % 0.5 0.0 - 2.0 %    Neutrophils Absolute 6.12 (H) 1.60 - 5.50 x10*3/uL    Immature Granulocytes Absolute, Automated 0.03 0.00 - 0.50 x10*3/uL    Lymphocytes Absolute 1.66 0.80 - 3.00 x10*3/uL    Monocytes Absolute 0.71 0.05 - 0.80 x10*3/uL    Eosinophils Absolute 0.06 0.00 - 0.40 x10*3/uL    Basophils Absolute 0.04 0.00 - 0.10 x10*3/uL   Comprehensive Metabolic Panel   Result Value Ref Range    Glucose 128 (H) 74 - 99 mg/dL    Sodium 143 136 - 145 mmol/L    Potassium 3.7 3.5 - 5.3 mmol/L    Chloride 109 (H) 98 - 107 mmol/L    Bicarbonate 24 21 - 32 mmol/L    Anion Gap 14 10 - 20 mmol/L    Urea Nitrogen 12 6 - 23 mg/dL    Creatinine 0.87 0.50 - 1.05 mg/dL    eGFR 64 >60 mL/min/1.73m*2    Calcium 10.4 (H) 8.6 - 10.3 mg/dL     Albumin 3.9 3.4 - 5.0 g/dL    Alkaline Phosphatase 97 33 - 136 U/L    Total Protein 6.3 (L) 6.4 - 8.2 g/dL    AST 59 (H) 9 - 39 U/L    Bilirubin, Total 1.1 0.0 - 1.2 mg/dL    ALT 36 7 - 45 U/L   Lactate   Result Value Ref Range    Lactate 2.1 (H) 0.4 - 2.0 mmol/L   Blood Culture    Specimen: Peripheral Venipuncture; Blood culture   Result Value Ref Range    Blood Culture Loaded on Instrument - Culture in progress    Blood Culture    Specimen: Peripheral Venipuncture; Blood culture   Result Value Ref Range    Blood Culture Loaded on Instrument - Culture in progress    Sars-CoV-2 and Influenza A/B PCR   Result Value Ref Range    Flu A Result Not Detected Not Detected    Flu B Result Not Detected Not Detected    Coronavirus 2019, PCR Not Detected Not Detected   Urinalysis with Reflex Culture and Microscopic   Result Value Ref Range    Color, Urine Yellow Light-Yellow, Yellow, Dark-Yellow    Appearance, Urine Clear Clear    Specific Gravity, Urine 1.026 1.005 - 1.035    pH, Urine 5.5 5.0, 5.5, 6.0, 6.5, 7.0, 7.5, 8.0    Protein, Urine NEGATIVE NEGATIVE, 10 (TRACE), 20 (TRACE) mg/dL    Glucose, Urine Normal Normal mg/dL    Blood, Urine NEGATIVE NEGATIVE mg/dL    Ketones, Urine TRACE (A) NEGATIVE mg/dL    Bilirubin, Urine NEGATIVE NEGATIVE mg/dL    Urobilinogen, Urine 3 (1+) (A) Normal mg/dL    Nitrite, Urine NEGATIVE NEGATIVE    Leukocyte Esterase, Urine NEGATIVE NEGATIVE   CBC   Result Value Ref Range    WBC 6.7 4.4 - 11.3 x10*3/uL    nRBC 0.0 0.0 - 0.0 /100 WBCs    RBC 4.28 4.00 - 5.20 x10*6/uL    Hemoglobin 13.8 12.0 - 16.0 g/dL    Hematocrit 41.1 36.0 - 46.0 %    MCV 96 80 - 100 fL    MCH 32.2 26.0 - 34.0 pg    MCHC 33.6 32.0 - 36.0 g/dL    RDW 13.2 11.5 - 14.5 %    Platelets 210 150 - 450 x10*3/uL   Basic metabolic panel   Result Value Ref Range    Glucose 140 (H) 74 - 99 mg/dL    Sodium 146 (H) 136 - 145 mmol/L    Potassium 3.4 (L) 3.5 - 5.3 mmol/L    Chloride 114 (H) 98 - 107 mmol/L    Bicarbonate 25 21 - 32  mmol/L    Anion Gap 10 10 - 20 mmol/L    Urea Nitrogen 10 6 - 23 mg/dL    Creatinine 0.67 0.50 - 1.05 mg/dL    eGFR 84 >60 mL/min/1.73m*2    Calcium 10.1 8.6 - 10.3 mg/dL   POCT GLUCOSE   Result Value Ref Range    POCT Glucose 161 (H) 74 - 99 mg/dL   Lavender Top   Result Value Ref Range    Extra Tube Hold for add-ons.    CT abdomen pelvis w IV contrast  Result Date: 4/19/2025  Interpreted By:  Dieter Levine, STUDY: CT ABDOMEN PELVIS W IV CONTRAST;  4/19/2025 12:46 pm   INDICATION: Signs/Symptoms:Tenderness over suprapubic region and right lower quadrant.   COMPARISON: 02/13/2023   ACCESSION NUMBER(S): [id number]   ORDERING CLINICIAN: JJ ROWLEY   TECHNIQUE: CT of the abdomen and pelvis was performed. Contiguous axial images were obtained at 3 mm slice thickness through the abdomen and pelvis. Coronal and sagittal reconstructions at 3 mm slice thickness were performed.  75 ML of Omnipaque 350 was administered intravenously without immediate complication.   FINDINGS: LOWER CHEST: Moderate emphysema and fibrosis similar to the previous exam. The ascending aorta is mildly dilated measuring up to 4 cm in diameter, without significant atherosclerotic calcification.   ABDOMEN:   LIVER: A slightly lobulated 1.6 cm hypodensity is present at the right liver lobe, corresponding to hypodensity on the prior examination and with what appear to be several foci of nodular enhancement most consistent with a hemangioma. The hepatic parenchyma otherwise is homogeneous.The hepatic size is normal.   SPLEEN: The spleen is normal in size and homogeneous.   ADRENAL GLANDS: Bilateral adrenal glands appear normal.   KIDNEYS AND URETERS: There are several small cortical cysts.  Additionally there are several hypodensities too small to definitively characterize, but statistically benign such as cysts. The renal cortices otherwise are maintained..   The ureters throughout their distal course are not well identified due to a paucity of  intra-abdominal fat and overlying crowded bowel loops, but the tracts otherwise are unremarkable without identified ureteral dilatation or radiodense calculi.   PANCREAS: The pancreas appears unremarkable, there is no ductal dilatation or masses.   GALLBLADDER: Cholelithiasis is noted, but without wall thickening or pericholecystic fluid to indicate cholecystitis.   BILE DUCTS: There is no biliary dilatation or filling defects.     VESSELS: Moderate atherosclerotic calcification of the aorta and iliac vessels, and proximal segment of the left renal artery. This appears fairly similar to the prior exam.   PERITONEUM AND RETROPERITONEUM: No ascites or free air, no fluid collection.   There are several surgical clips at the left upper abdomen posteriorly similar to the previous exam. No significant retroperitoneal adenopathy.   No enlarged mesenteric lymph nodes.   BOWEL: Mild diverticulosis of the sigmoid and descending colon, fairly similar to the prior exam. The bowel otherwise is unremarkable without significant dilatation or mural thickening.   PELVIS:   BLADDER: The urinary bladder contour is smooth.   REPRODUCTIVE ORGANS: Status post hysterectomy.     BONE, ABDOMINAL WALL AND OTHER FINDINGS: No suspicious osseous lesions are identified.   Fat containing small left non incarcerated inguinal hernia is noted.This appears similar to the previous exam.       1.  Emphysema and fibrosis at the lung bases. Mild aneurysmal dilatation of the ascending aorta measuring 4 cm. 2. Cholelithiasis. 3. Diverticulosis. 4. Hepatic hemangioma. Nonincarcerated small left inguinal hernia. Otherwise no acute findings.   MACRO: 1. None   Signed by: Dieter Levine 4/19/2025 1:17 PM Dictation workstation:   [id number]    CT head wo IV contrast  Result Date: 4/19/2025  Interpreted By:  Dieter Levine, STUDY: CT HEAD WO IV CONTRAST;  4/19/2025 12:46 pm   INDICATION: Signs/Symptoms:Worsening confusion x 2 weeks, unsteady on feet.   COMPARISON:  10/24/2021   ACCESSION NUMBER(S): [id number]   ORDERING CLINICIAN: JJ ROWLEY   TECHNIQUE: Sequential trans axial images were obtained  .   FINDINGS: INTRACRANIAL:   There is moderate prominence of the cortical sulci indicating atrophy.   There is moderate ventriculomegaly, again consistent with atrophy.   Moderate decreased attenuation of the periventricular and long tracks of the white matter most consistent with gliosis from arterial disease. There is no evidence of definite subacute infarction, intracranial hemorrhage or mass.     EXTRACRANIAL: Moderate mucosal thickening of the right maxillary air cell with 1.4 cm nodularity indicating a polyp or mucous retention cyst. The calvarium is intact.       Moderate age-related degenerative change appears progressed from previous examination, but without acute findings.   Signed by: Dieter Levine 4/19/2025 1:07 PM Dictation workstation:   [id number]    XR lumbar spine 2-3 views  Result Date: 4/11/2025  Interpreted By:  Jareth Hess, STUDY: XR LUMBAR SPINE 2-3 VIEWS   INDICATION: Signs/Symptoms:lbp.   COMPARISON: None   ACCESSION NUMBER(S): [id number]   ORDERING CLINICIAN: JACQUIE CASEY   FINDINGS: Fairly advanced lumbar degenerative changes mostly L3-S1 with a mild scoliosis. No evidence of fracture or lesion.       Advanced lower lumbar degenerative changes with mild scoliosis.   Signed by: Jareth Hess 4/11/2025 7:01 AM Dictation workstation:   [id number]      Assessment/Plan   Assessment & Plan  Confusion  89-year-old lady with history of hypertension, hyperlipidemia, previous breast carcinoma in remission on anastrozole, diverticulosis of colon and asymptomatic cholelithiasis seen on CT scan of abdomen, history of seizure disorder and chronic lower back pain for which she has been taking tramadol presents with confusion and wobbly gait for the last 5 to 7 days and forgetfulness.  On examination she was noted to be intermittent confusion and left  hemiataxia.    1.  Acute intermittent confusion could be medication related including tramadol which she has been taking 300 mg daily for many years for chronic back pain  However early dementia is a possibility, will observe closely    2.  Left hemiataxia and wobbly gait in a patient who has a history of seizure disorder, needs further investigation as per neurology an MRI of the brain has been ordered, CT of the brain was not suggestive of any acute stroke.  Patient has been started on aspirin and will continue with atorvastatin    3.  Hypertension stable will monitor closely    4.  History of breast carcinoma in remission resume anastrozole    5.  Chronic back pain, CT of the lumbar spine revealed no fractures, does have significant arthritis and degenerative disc disease, will add Tylenol for pain as needed and hold tramadol for the time being.    6.  Seizure Disorder, Continue with Keppra.    Will order PT OT.    Neurology consultation appreciated, patient awaiting to have MRI of the brain, reviewed all labs and imaging studies including CT of abdomen and pelvis and CT brain.  Total time spent in examination counseling coordinating care for this patient was greater than 65 minutes.           James Galindo MD         [1]   Family History  Problem Relation Name Age of Onset    Lung cancer Father      Other (Carcinoma of the pancreas) Sister      Kidney cancer Sister      Arrhythmia Brother          ALl 3 Brothers     Other (Parkinson disease) Brother      Prostate cancer Brother     [2]    [3] PRN medications: acetaminophen, ondansetron **OR** ondansetron

## 2025-04-21 LAB
FOLATE SERPL-MCNC: 23.1 NG/ML
TSH SERPL-ACNC: 0.45 MIU/L (ref 0.44–3.98)
VIT B12 SERPL-MCNC: 1545 PG/ML (ref 211–911)

## 2025-04-21 PROCEDURE — 99232 SBSQ HOSP IP/OBS MODERATE 35: CPT

## 2025-04-21 PROCEDURE — 1100000001 HC PRIVATE ROOM DAILY

## 2025-04-21 PROCEDURE — 2500000002 HC RX 250 W HCPCS SELF ADMINISTERED DRUGS (ALT 637 FOR MEDICARE OP, ALT 636 FOR OP/ED): Performed by: INTERNAL MEDICINE

## 2025-04-21 PROCEDURE — 2500000004 HC RX 250 GENERAL PHARMACY W/ HCPCS (ALT 636 FOR OP/ED): Performed by: INTERNAL MEDICINE

## 2025-04-21 PROCEDURE — 97165 OT EVAL LOW COMPLEX 30 MIN: CPT | Mod: GO

## 2025-04-21 PROCEDURE — 84443 ASSAY THYROID STIM HORMONE: CPT | Performed by: INTERNAL MEDICINE

## 2025-04-21 PROCEDURE — 36415 COLL VENOUS BLD VENIPUNCTURE: CPT | Performed by: INTERNAL MEDICINE

## 2025-04-21 PROCEDURE — 2500000002 HC RX 250 W HCPCS SELF ADMINISTERED DRUGS (ALT 637 FOR MEDICARE OP, ALT 636 FOR OP/ED): Performed by: NURSE PRACTITIONER

## 2025-04-21 PROCEDURE — 82746 ASSAY OF FOLIC ACID SERUM: CPT | Mod: AHULAB | Performed by: INTERNAL MEDICINE

## 2025-04-21 PROCEDURE — 82607 VITAMIN B-12: CPT | Mod: AHULAB | Performed by: INTERNAL MEDICINE

## 2025-04-21 PROCEDURE — 97161 PT EVAL LOW COMPLEX 20 MIN: CPT | Mod: GP

## 2025-04-21 PROCEDURE — 99232 SBSQ HOSP IP/OBS MODERATE 35: CPT | Performed by: INTERNAL MEDICINE

## 2025-04-21 PROCEDURE — 2500000001 HC RX 250 WO HCPCS SELF ADMINISTERED DRUGS (ALT 637 FOR MEDICARE OP): Performed by: INTERNAL MEDICINE

## 2025-04-21 RX ORDER — OLANZAPINE 5 MG/1
5 TABLET, ORALLY DISINTEGRATING ORAL ONCE
Status: COMPLETED | OUTPATIENT
Start: 2025-04-21 | End: 2025-04-21

## 2025-04-21 RX ORDER — METOPROLOL SUCCINATE 50 MG/1
100 TABLET, EXTENDED RELEASE ORAL DAILY
Status: DISCONTINUED | OUTPATIENT
Start: 2025-04-21 | End: 2025-04-21

## 2025-04-21 RX ORDER — POTASSIUM CHLORIDE 20 MEQ/1
40 TABLET, EXTENDED RELEASE ORAL ONCE
Status: COMPLETED | OUTPATIENT
Start: 2025-04-21 | End: 2025-04-21

## 2025-04-21 RX ORDER — METOPROLOL SUCCINATE 50 MG/1
100 TABLET, EXTENDED RELEASE ORAL DAILY
Status: DISCONTINUED | OUTPATIENT
Start: 2025-04-22 | End: 2025-04-24 | Stop reason: HOSPADM

## 2025-04-21 RX ADMIN — ANASTROZOLE 1 MG: 1 TABLET, COATED ORAL at 09:58

## 2025-04-21 RX ADMIN — ATORVASTATIN CALCIUM 10 MG: 10 TABLET, FILM COATED ORAL at 20:11

## 2025-04-21 RX ADMIN — Medication 50 MCG: at 09:58

## 2025-04-21 RX ADMIN — ACETAMINOPHEN 650 MG: 325 TABLET ORAL at 20:19

## 2025-04-21 RX ADMIN — POTASSIUM CHLORIDE 40 MEQ: 1500 TABLET, EXTENDED RELEASE ORAL at 09:58

## 2025-04-21 RX ADMIN — ACETAMINOPHEN 650 MG: 325 TABLET ORAL at 10:04

## 2025-04-21 RX ADMIN — MIRTAZAPINE 7.5 MG: 15 TABLET, FILM COATED ORAL at 20:09

## 2025-04-21 RX ADMIN — LEVETIRACETAM 500 MG: 500 TABLET, FILM COATED, EXTENDED RELEASE ORAL at 20:11

## 2025-04-21 RX ADMIN — ENOXAPARIN SODIUM 40 MG: 40 INJECTION SUBCUTANEOUS at 16:45

## 2025-04-21 RX ADMIN — PANTOPRAZOLE SODIUM 40 MG: 40 TABLET, DELAYED RELEASE ORAL at 09:58

## 2025-04-21 RX ADMIN — OLANZAPINE 5 MG: 5 TABLET, ORALLY DISINTEGRATING ORAL at 01:22

## 2025-04-21 RX ADMIN — ASPIRIN 81 MG: 81 TABLET, COATED ORAL at 09:58

## 2025-04-21 RX ADMIN — POLYETHYLENE GLYCOL 3350 17 G: 17 POWDER, FOR SOLUTION ORAL at 09:59

## 2025-04-21 RX ADMIN — LEVETIRACETAM 500 MG: 500 TABLET, FILM COATED, EXTENDED RELEASE ORAL at 09:58

## 2025-04-21 RX ADMIN — ACETAMINOPHEN 650 MG: 325 TABLET ORAL at 16:55

## 2025-04-21 SDOH — ECONOMIC STABILITY: TRANSPORTATION INSECURITY: IN THE PAST 12 MONTHS, HAS LACK OF TRANSPORTATION KEPT YOU FROM MEDICAL APPOINTMENTS OR FROM GETTING MEDICATIONS?: NO

## 2025-04-21 SDOH — ECONOMIC STABILITY: FOOD INSECURITY: HOW HARD IS IT FOR YOU TO PAY FOR THE VERY BASICS LIKE FOOD, HOUSING, MEDICAL CARE, AND HEATING?: NOT VERY HARD

## 2025-04-21 SDOH — ECONOMIC STABILITY: HOUSING INSECURITY: AT ANY TIME IN THE PAST 12 MONTHS, WERE YOU HOMELESS OR LIVING IN A SHELTER (INCLUDING NOW)?: NO

## 2025-04-21 SDOH — ECONOMIC STABILITY: HOUSING INSECURITY: IN THE LAST 12 MONTHS, WAS THERE A TIME WHEN YOU WERE NOT ABLE TO PAY THE MORTGAGE OR RENT ON TIME?: NO

## 2025-04-21 ASSESSMENT — COGNITIVE AND FUNCTIONAL STATUS - GENERAL
HELP NEEDED FOR BATHING: A LITTLE
WALKING IN HOSPITAL ROOM: A LOT
CLIMB 3 TO 5 STEPS WITH RAILING: A LOT
DRESSING REGULAR LOWER BODY CLOTHING: A LITTLE
TURNING FROM BACK TO SIDE WHILE IN FLAT BAD: A LITTLE
MOBILITY SCORE: 16
MOVING FROM LYING ON BACK TO SITTING ON SIDE OF FLAT BED WITH BEDRAILS: A LITTLE
PERSONAL GROOMING: A LITTLE
EATING MEALS: A LITTLE
DRESSING REGULAR UPPER BODY CLOTHING: A LITTLE
DAILY ACTIVITIY SCORE: 18
STANDING UP FROM CHAIR USING ARMS: A LITTLE
MOVING TO AND FROM BED TO CHAIR: A LITTLE
TOILETING: A LITTLE

## 2025-04-21 ASSESSMENT — PAIN SCALES - GENERAL
PAINLEVEL_OUTOF10: 5 - MODERATE PAIN
PAINLEVEL_OUTOF10: 3
PAINLEVEL_OUTOF10: 0 - NO PAIN
PAINLEVEL_OUTOF10: 0 - NO PAIN
PAINLEVEL_OUTOF10: 5 - MODERATE PAIN

## 2025-04-21 ASSESSMENT — ACTIVITIES OF DAILY LIVING (ADL)
BATHING_ASSISTANCE: MINIMAL
ADL_ASSISTANCE: INDEPENDENT
LACK_OF_TRANSPORTATION: NO
ADL_ASSISTANCE: INDEPENDENT

## 2025-04-21 ASSESSMENT — PAIN - FUNCTIONAL ASSESSMENT: PAIN_FUNCTIONAL_ASSESSMENT: 0-10

## 2025-04-21 NOTE — PROGRESS NOTES
Ann Marie Murphy is a 89 y.o. female on day 2 of admission presenting with Confusion.    Subjective   Patient seen and examined, patient's daughter Mary is in the room.  Patient daughter mentioned that patient has shown significant manage she is fully alert and awake and will evaluate her about this.  His gait is improved and MRI is unremarkable.  Blood pressures are moderately elevated, will resume home medications as needed.  All labs were satisfactory  Was evaluated by PT OT and will consider discharging patient home tomorrow.       Objective     Physical Exam  GENERAL:  Alert, no distress, cooperative  SKIN:  Skin color, texture, turgor normal. No rashes or lesions.  OROPHARYNX:  Lips, mucosa, and tongue are normal.Teeth and gums, normal. Oropharynx normal.  NECK:  No jugulovenous distention, No carotid bruits, Carotid pulse normal contour, Supple  LUNGS:  Lungs clear to auscultation. Good diaphragmatic excursion.  CARDIAC: Rate and rhythm is regular, normal S1 and S2; no rubs,  or gallops, 2/6 systolic ejection murmur left sternal border, no edema no CHF  ABDOMEN:  Abdomen soft, non-tender, BS normal, No masses or organomegaly  EXTREMETIES:  Extremities normal, no deformities, edema, clubbing or skin discoloration. Good capillary refill., No ulcers  NEURO:  Alert, oriented X 3, Gait  unsteady. Non-focal. Reflexes normal and symmetric.  Cranial nerves II-XII intact  PULSES:  2+ radial, 2+ carotid    Last Recorded Vitals  Blood pressure 136/72, pulse 110, temperature 36.7 °C (98.1 °F), resp. rate 20, height 1.524 m (5'), weight 54.4 kg (120 lb), SpO2 97%.  Intake/Output last 3 Shifts:  No intake/output data recorded.    Relevant Results   Scheduled medications  Scheduled Medications[1]  Continuous medications  Continuous Medications[2]  PRN medications  PRN Medications[3]   Results for orders placed or performed during the hospital encounter of 04/19/25 (from the past 96 hours)   POCT GLUCOSE   Result Value  Ref Range    POCT Glucose 150 (H) 74 - 99 mg/dL   CBC and Auto Differential   Result Value Ref Range    WBC 8.6 4.4 - 11.3 x10*3/uL    nRBC 0.0 0.0 - 0.0 /100 WBCs    RBC 4.36 4.00 - 5.20 x10*6/uL    Hemoglobin 13.6 12.0 - 16.0 g/dL    Hematocrit 41.5 36.0 - 46.0 %    MCV 95 80 - 100 fL    MCH 31.2 26.0 - 34.0 pg    MCHC 32.8 32.0 - 36.0 g/dL    RDW 12.9 11.5 - 14.5 %    Platelets 227 150 - 450 x10*3/uL    Neutrophils % 71.0 40.0 - 80.0 %    Immature Granulocytes %, Automated 0.3 0.0 - 0.9 %    Lymphocytes % 19.3 13.0 - 44.0 %    Monocytes % 8.2 2.0 - 10.0 %    Eosinophils % 0.7 0.0 - 6.0 %    Basophils % 0.5 0.0 - 2.0 %    Neutrophils Absolute 6.12 (H) 1.60 - 5.50 x10*3/uL    Immature Granulocytes Absolute, Automated 0.03 0.00 - 0.50 x10*3/uL    Lymphocytes Absolute 1.66 0.80 - 3.00 x10*3/uL    Monocytes Absolute 0.71 0.05 - 0.80 x10*3/uL    Eosinophils Absolute 0.06 0.00 - 0.40 x10*3/uL    Basophils Absolute 0.04 0.00 - 0.10 x10*3/uL   Comprehensive Metabolic Panel   Result Value Ref Range    Glucose 128 (H) 74 - 99 mg/dL    Sodium 143 136 - 145 mmol/L    Potassium 3.7 3.5 - 5.3 mmol/L    Chloride 109 (H) 98 - 107 mmol/L    Bicarbonate 24 21 - 32 mmol/L    Anion Gap 14 10 - 20 mmol/L    Urea Nitrogen 12 6 - 23 mg/dL    Creatinine 0.87 0.50 - 1.05 mg/dL    eGFR 64 >60 mL/min/1.73m*2    Calcium 10.4 (H) 8.6 - 10.3 mg/dL    Albumin 3.9 3.4 - 5.0 g/dL    Alkaline Phosphatase 97 33 - 136 U/L    Total Protein 6.3 (L) 6.4 - 8.2 g/dL    AST 59 (H) 9 - 39 U/L    Bilirubin, Total 1.1 0.0 - 1.2 mg/dL    ALT 36 7 - 45 U/L   Lactate   Result Value Ref Range    Lactate 2.1 (H) 0.4 - 2.0 mmol/L   Blood Culture    Specimen: Peripheral Venipuncture; Blood culture   Result Value Ref Range    Blood Culture No growth at 1 day    Blood Culture    Specimen: Peripheral Venipuncture; Blood culture   Result Value Ref Range    Blood Culture No growth at 1 day    Sars-CoV-2 and Influenza A/B PCR   Result Value Ref Range    Flu A Result Not  Detected Not Detected    Flu B Result Not Detected Not Detected    Coronavirus 2019, PCR Not Detected Not Detected   Urinalysis with Reflex Culture and Microscopic   Result Value Ref Range    Color, Urine Yellow Light-Yellow, Yellow, Dark-Yellow    Appearance, Urine Clear Clear    Specific Gravity, Urine 1.026 1.005 - 1.035    pH, Urine 5.5 5.0, 5.5, 6.0, 6.5, 7.0, 7.5, 8.0    Protein, Urine NEGATIVE NEGATIVE, 10 (TRACE), 20 (TRACE) mg/dL    Glucose, Urine Normal Normal mg/dL    Blood, Urine NEGATIVE NEGATIVE mg/dL    Ketones, Urine TRACE (A) NEGATIVE mg/dL    Bilirubin, Urine NEGATIVE NEGATIVE mg/dL    Urobilinogen, Urine 3 (1+) (A) Normal mg/dL    Nitrite, Urine NEGATIVE NEGATIVE    Leukocyte Esterase, Urine NEGATIVE NEGATIVE   CBC   Result Value Ref Range    WBC 6.7 4.4 - 11.3 x10*3/uL    nRBC 0.0 0.0 - 0.0 /100 WBCs    RBC 4.28 4.00 - 5.20 x10*6/uL    Hemoglobin 13.8 12.0 - 16.0 g/dL    Hematocrit 41.1 36.0 - 46.0 %    MCV 96 80 - 100 fL    MCH 32.2 26.0 - 34.0 pg    MCHC 33.6 32.0 - 36.0 g/dL    RDW 13.2 11.5 - 14.5 %    Platelets 210 150 - 450 x10*3/uL   Basic metabolic panel   Result Value Ref Range    Glucose 140 (H) 74 - 99 mg/dL    Sodium 146 (H) 136 - 145 mmol/L    Potassium 3.4 (L) 3.5 - 5.3 mmol/L    Chloride 114 (H) 98 - 107 mmol/L    Bicarbonate 25 21 - 32 mmol/L    Anion Gap 10 10 - 20 mmol/L    Urea Nitrogen 10 6 - 23 mg/dL    Creatinine 0.67 0.50 - 1.05 mg/dL    eGFR 84 >60 mL/min/1.73m*2    Calcium 10.1 8.6 - 10.3 mg/dL   POCT GLUCOSE   Result Value Ref Range    POCT Glucose 161 (H) 74 - 99 mg/dL   Levetiracetam   Result Value Ref Range    Keppra 10 10 - 40 ug/mL   Lavender Top   Result Value Ref Range    Extra Tube Hold for add-ons.    TSH with reflex to Free T4 if abnormal   Result Value Ref Range    Thyroid Stimulating Hormone 0.45 0.44 - 3.98 mIU/L    MR brain wo IV contrast  Result Date: 4/20/2025  Interpreted By:  Zach Galvan, STUDY: MR BRAIN WO IV CONTRAST;  4/20/2025 4:25 pm    INDICATION: Signs/Symptoms:Left hemiataxia and encephalopathy.     COMPARISON: April 19, 2025   ACCESSION NUMBER(S): [id number]   ORDERING CLINICIAN: JACQUIE GARLAND   TECHNIQUE: Multiplaner, multisequence MRI were obtained without contrast   FINDINGS: Parenchyma:  There is a background of age-related volume loss and presumed ischemic white matter demyelination.   Ventricles: There is no hydrocephalus.   Extra-axial spaces: No abnormal extra-axial fluid collection. Basal cisterns are patent.   Vessels: T2 flow voids are maintained.   Orbits: The imaged orbits are unremarkable.   Paranasal sinuses and mastoid air cells: No fluid levels are present.   Skull: There is normal T1 marrow signal without evidence of aggressive lesion.   Soft tissues: Unremarkable           1. Negative brain MRI examination for acute change. 2. There is a background of age-related volume loss, atherosclerotic disease, and ischemic white matter demyelination.   MACRO: None   Signed by: Zach Galvan 4/20/2025 4:54 PM Dictation workstation:   [id number]    CT abdomen pelvis w IV contrast  Result Date: 4/19/2025  Interpreted By:  Dieter Levine, STUDY: CT ABDOMEN PELVIS W IV CONTRAST;  4/19/2025 12:46 pm   INDICATION: Signs/Symptoms:Tenderness over suprapubic region and right lower quadrant.   COMPARISON: 02/13/2023   ACCESSION NUMBER(S): [id number]   ORDERING CLINICIAN: JJ ROWLEY   TECHNIQUE: CT of the abdomen and pelvis was performed. Contiguous axial images were obtained at 3 mm slice thickness through the abdomen and pelvis. Coronal and sagittal reconstructions at 3 mm slice thickness were performed.  75 ML of Omnipaque 350 was administered intravenously without immediate complication.   FINDINGS: LOWER CHEST: Moderate emphysema and fibrosis similar to the previous exam. The ascending aorta is mildly dilated measuring up to 4 cm in diameter, without significant atherosclerotic calcification.   ABDOMEN:   LIVER: A slightly lobulated  1.6 cm hypodensity is present at the right liver lobe, corresponding to hypodensity on the prior examination and with what appear to be several foci of nodular enhancement most consistent with a hemangioma. The hepatic parenchyma otherwise is homogeneous.The hepatic size is normal.   SPLEEN: The spleen is normal in size and homogeneous.   ADRENAL GLANDS: Bilateral adrenal glands appear normal.   KIDNEYS AND URETERS: There are several small cortical cysts.  Additionally there are several hypodensities too small to definitively characterize, but statistically benign such as cysts. The renal cortices otherwise are maintained..   The ureters throughout their distal course are not well identified due to a paucity of intra-abdominal fat and overlying crowded bowel loops, but the tracts otherwise are unremarkable without identified ureteral dilatation or radiodense calculi.   PANCREAS: The pancreas appears unremarkable, there is no ductal dilatation or masses.   GALLBLADDER: Cholelithiasis is noted, but without wall thickening or pericholecystic fluid to indicate cholecystitis.   BILE DUCTS: There is no biliary dilatation or filling defects.     VESSELS: Moderate atherosclerotic calcification of the aorta and iliac vessels, and proximal segment of the left renal artery. This appears fairly similar to the prior exam.   PERITONEUM AND RETROPERITONEUM: No ascites or free air, no fluid collection.   There are several surgical clips at the left upper abdomen posteriorly similar to the previous exam. No significant retroperitoneal adenopathy.   No enlarged mesenteric lymph nodes.   BOWEL: Mild diverticulosis of the sigmoid and descending colon, fairly similar to the prior exam. The bowel otherwise is unremarkable without significant dilatation or mural thickening.   PELVIS:   BLADDER: The urinary bladder contour is smooth.   REPRODUCTIVE ORGANS: Status post hysterectomy.     BONE, ABDOMINAL WALL AND OTHER FINDINGS: No suspicious  osseous lesions are identified.   Fat containing small left non incarcerated inguinal hernia is noted.This appears similar to the previous exam.       1.  Emphysema and fibrosis at the lung bases. Mild aneurysmal dilatation of the ascending aorta measuring 4 cm. 2. Cholelithiasis. 3. Diverticulosis. 4. Hepatic hemangioma. Nonincarcerated small left inguinal hernia. Otherwise no acute findings.   MACRO: 1. None   Signed by: Dieter Levine 4/19/2025 1:17 PM Dictation workstation:   [id number]    CT head wo IV contrast  Result Date: 4/19/2025  Interpreted By:  Dieter Levine, STUDY: CT HEAD WO IV CONTRAST;  4/19/2025 12:46 pm   INDICATION: Signs/Symptoms:Worsening confusion x 2 weeks, unsteady on feet.   COMPARISON: 10/24/2021   ACCESSION NUMBER(S): [id number]   ORDERING CLINICIAN: JJ ROWLEY   TECHNIQUE: Sequential trans axial images were obtained  .   FINDINGS: INTRACRANIAL:   There is moderate prominence of the cortical sulci indicating atrophy.   There is moderate ventriculomegaly, again consistent with atrophy.   Moderate decreased attenuation of the periventricular and long tracks of the white matter most consistent with gliosis from arterial disease. There is no evidence of definite subacute infarction, intracranial hemorrhage or mass.     EXTRACRANIAL: Moderate mucosal thickening of the right maxillary air cell with 1.4 cm nodularity indicating a polyp or mucous retention cyst. The calvarium is intact.       Moderate age-related degenerative change appears progressed from previous examination, but without acute findings.   Signed by: Dieter Levine 4/19/2025 1:07 PM Dictation workstation:   [id number]    XR lumbar spine 2-3 views  Result Date: 4/11/2025  Interpreted By:  Jareth Hess, STUDY: XR LUMBAR SPINE 2-3 VIEWS   INDICATION: Signs/Symptoms:lbp.   COMPARISON: None   ACCESSION NUMBER(S): [id number]   ORDERING CLINICIAN: JACQUIE CASEY   FINDINGS: Fairly advanced lumbar degenerative changes mostly L3-S1 with  a mild scoliosis. No evidence of fracture or lesion.       Advanced lower lumbar degenerative changes with mild scoliosis.   Signed by: Jareth Hess 4/11/2025 7:01 AM Dictation workstation:   [id number]                  Assessment & Plan  Confusion  89-year-old lady with history of hypertension, hyperlipidemia, previous breast carcinoma in remission on anastrozole, diverticulosis of colon and asymptomatic cholelithiasis seen on CT scan of abdomen, history of seizure disorder and chronic lower back pain for which she has been taking tramadol presents with confusion and wobbly gait for the last 5 to 7 days and forgetfulness.  On examination she was noted to be intermittent confusion and left hemiataxia.    1.  Acute intermittent confusion could be medication related including tramadol which she has been taking 300 mg daily for many years for chronic back pain  However patient also has early dementia  Her confusion has resolved now ,she is very appropriate and according to daughter she is back to baseline.    2.  Left hemiataxia and wobbly gait in a patient who has a history of seizure disorder,  CT of the brain was not suggestive of any acute stroke.  Patient has been started on aspirin and will continue with atorvastatin  Seen by neurology other day and MRI of the brain was ordered which was completed which revealed no evidence of acute stroke.    3.  Hypertension, blood pressures are elevated, will resume metoprolol and patient was also on valsartan which could be resumed if blood pressure persistently remain elevated.    4.  History of breast carcinoma in remission resumed anastrozole    5.  Chronic back pain, CT of the lumbar spine revealed no fractures, does have significant arthritis and degenerative disc disease, continue Tylenol for pain as needed and hold tramadol for the time being.    6.  Seizure Disorder, Continue with Keppra.     PT OT.  Evaluated patient today.    Overall significant improvement in her  condition since admission and MRI is negative for acute stroke and no bony injuries are noted.  Physical therapy is working on patient and patient started ambulating better and anticipate discharge home tomorrow.    Patient's daughter was in the room who is updated with patient's condition.    Total time spent in examination counseling coordinating care for this patient was greater than 35 minutes.    James Galindo MD         [1] anastrozole, 1 mg, oral, Daily  aspirin, 81 mg, oral, Daily  atorvastatin, 10 mg, oral, Nightly  cholecalciferol, 50 mcg, oral, Daily  enoxaparin, 40 mg, subcutaneous, q24h  levETIRAcetam XR, 500 mg, oral, BID  mirtazapine, 7.5 mg, oral, Nightly  pantoprazole, 40 mg, oral, Daily before breakfast  polyethylene glycol, 17 g, oral, Daily  [2]    [3] PRN medications: acetaminophen, ondansetron **OR** ondansetron

## 2025-04-21 NOTE — PROGRESS NOTES
Occupational Therapy    Evaluation    Patient Name: Ann Marie Murphy  MRN: [id number]  Department: [facility]  Room: [address] [place] [address]  Today's Date: 4/21/2025  Time Calculation  Start Time: 1019  Stop Time: 1047  Time Calculation (min): 28 min        Assessment:  OT Assessment: Pt presents with impulsivity, impaired attention, decreased safety awareness and decreased problem solving. She is below baseline in ADLs, IADLs and fxnl mob. She would benefit from ongoing acute OT as well as Mod intensity OT at discharge.  Prognosis: Good  Barriers to Discharge Home: Cognition needs, Physical needs, Caregiver assistance  Caregiver Assistance: Patient lives alone and/or does not have reliable caregiver assistance  Cognition Needs: Cognition-related high falls risk, 24hr supervision for safety awareness needed, Medication and/or medical management daily assist needed, Recollection or understanding of precautions/restrictions limited, Insight of patient limited regarding functional ability/needs  Physical Needs: Stair navigation into home limited by function/safety, 24hr mobility assistance needed, Intermittent ADL assistance needed, High falls risk due to function or environment  Evaluation/Treatment Tolerance: Patient tolerated treatment well  Medical Staff Made Aware: Yes  End of Session Communication: Bedside nurse  End of Session Patient Position: Up in chair, Alarm on  OT Assessment Results: Decreased ADL status, Decreased cognition, Decreased functional mobility, Decreased IADLs, Decreased safe judgment during ADL  Prognosis: Good  Barriers to Discharge: Decreased caregiver support, Inaccessible home environment  Evaluation/Treatment Tolerance: Patient tolerated treatment well  Medical Staff Made Aware: Yes  Strengths: Ability to acquire knowledge, Support of extended family/friends  Barriers to Participation: Comorbidities, Insight into problems  Plan:  Treatment Interventions: ADL retraining, Functional transfer training,  Endurance training, Patient/family training, Equipment evaluation/education  OT Frequency: 3 times per week  OT Discharge Recommendations: Moderate intensity level of continued care  Equipment Recommended upon Discharge: Wheeled walker (owns)  OT Recommended Transfer Status: Minimal assist, Assist of 1  OT - OK to Discharge: Yes (POC established)  Treatment Interventions: ADL retraining, Functional transfer training, Endurance training, Patient/family training, Equipment evaluation/education    Subjective   Current Problem:  1. Confusion        2. Difficulty walking          General:  General  Reason for Referral: To ED with new onset full body shaking and confusion. Pt also reporting abdominal pain with imaging showing cholelithiasis. Brain imaging (-) for acute processes.  Referred By: James Galindo MD  Past Medical History Relevant to Rehab:   Past Medical History:   Diagnosis Date    Anxiety disorder due to known physiological condition     Anxiety disorder due to a general medical condition    Breast cancer     Cardiomyopathy, unspecified     Cardiomyopathy    Diverticulosis of intestine, part unspecified, without perforation or abscess without bleeding     Diverticulosis    Encounter for screening mammogram for malignant neoplasm of breast     Visit for screening mammogram    Localization-related (focal) (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, not intractable, without status epilepticus     Complex partial seizure    Other conditions influencing health status     Ovarian Torsion On The Left    Other conditions influencing health status     Stroke syndrome    Other specified noninflammatory disorders of uterus     Fibrosis of uterus    Personal history of other diseases of the circulatory system     History of hypertension    Personal history of other diseases of the circulatory system     History of hypertrophic cardiomyopathy    Personal history of other  diseases of the digestive system     History of cholelithiasis    Personal history of other diseases of the digestive system     History of hemorrhoids    Personal history of other diseases of the digestive system     History of hiatal hernia    Personal history of other diseases of the digestive system     History of esophageal reflux    Personal history of other diseases of the female genital tract 01/14/2020    History of breast pain    Personal history of other endocrine, nutritional and metabolic disease     History of hypercholesterolemia    Personal history of other endocrine, nutritional and metabolic disease 03/24/2016    History of Cushing's syndrome    Personal history of transient ischemic attack (TIA), and cerebral infarction without residual deficits     History of transient cerebral ischemia    Unspecified convulsions (Multi)     Generalized convulsive seizure     Family/Caregiver Present: Yes  Caregiver Feedback: daughter arrived towards end of eval, provided confirmation on PLOF and home setup  Co-Treatment: PT  Co-Treatment Reason: to maximize pt safety, participation and outcomes  Prior to Session Communication: Bedside nurse  Patient Position Received: Bed, 3 rail up, Alarm on  Preferred Learning Style: kinesthetic, auditory, verbal, visual  General Comment: pleasant and cooperative, very poor attention span, requires frequent redirection  Precautions:  Medical Precautions: Fall precautions     Date/Time Vitals Session Patient Position Pulse Resp SpO2 BP MAP (mmHg)    04/21/25 1244 --  --  110  20  97 %  136/72  87           Pain:  Pain Assessment  0-10 (Numeric) Pain Score: 0 - No pain    Objective   Cognition:  Orientation Level: Disoriented to time, Disoriented to situation  Attention: Exceptions to WFL  Alternating Attention: Impaired  Divided Attention: Impaired  Selective Attention: Impaired  Sustained Attention: Impaired  Memory: Exceptions to WFL  Long-Term Memory: Impaired  Insight:  Moderate  Impulsive: Severely     Home Living:  Type of Home: Apartment  Lives With: Alone  Home Adaptive Equipment: Walker rolling or standard  Home Layout: Two level  Home Access: Stairs to enter with rails  Entrance Stairs-Number of Steps: per daughter, 2 flights of stairs to get to apartment- pt was doing independently before admission  Bathroom Shower/Tub: Tub/shower unit  Bathroom Toilet: Standard  Bathroom Equipment: Grab bars in shower, Shower chair with back  Home Living Comments: pt lives in a 4 unit apartment, her family lives in the other 3 units and are available for assistance PRN throughout the day. Per daughter, pt was compeltely independent prior to this admission, ind with stairs, ADLs and IADLs but does not drive.  Prior Function:  Level of [place]: Independent with ADLs and functional transfers, Needs assistance with homemaking  Receives Help From: Family  ADL Assistance: Independent  Homemaking Assistance: Needs assistance  Ambulatory Assistance: Independent (started using a WW 1 week ago)  IADL History:     ADL:  Eating Assistance: Independent  Grooming Assistance: Stand by  Grooming Deficit: Supervision/safety, Standing with assistive device, Increased time to complete  Bathing Assistance: Minimal  Bathing Deficit: Increased time to complete , Supervision/safety, Steadying  UE Dressing Assistance: Stand by  UE Dressing Deficit: Setup  LE Dressing Assistance: Minimal  LE Dressing Deficit: Increased time to complete, Verbal cueing, Supervision/safety, Requires assistive device for steadying, Steadying  Toileting Assistance with Device: Minimal  Toileting Deficit: Supervison/safety, Increased time to complete, Grab bar use  ADL Comments: pt requires hands on assist for ADLs due to impulsivity and unsteadiness in standing  Activity Tolerance:  Endurance: Tolerates 10 - 20 min exercise with multiple rests  Bed Mobility/Transfers: Bed Mobility  Bed Mobility: Yes  Bed Mobility 1  Bed Mobility 1:  Supine to sitting  Level of Assistance 1: Close supervision  Bed Mobility Comments 1: HOB slightly elevated    Transfers  Transfer: Yes  Transfer 1  Transfer From 1: Bed to  Transfer to 1: Stand  Technique 1: Sit to stand, Stand to sit  Transfer Device 1: Walker  Transfer Level of Assistance 1: Contact guard  Transfers 2  Transfer From 2: Bed to  Transfer to 2: Chair with arms  Technique 2: Stand pivot  Transfer Device 2: Walker  Transfer Level of Assistance 2: Contact guard  Trials/Comments 2: cues for proximity, hand placement ,safety awareness      Functional Mobility:  Functional Mobility  Functional Mobility Performed: Yes  Functional Mobility 1  Surface 1: Level tile  Device 1: Rolling walker  Functional Mobility Support Devices: Gait belt  Assistance 1: Minimum assistance  Comments 1: impulsive, max  cues for speed and proximity towards RW.  Sitting Balance:  Static Sitting Balance  Static Sitting-Balance Support: Feet supported  Static Sitting-Level of Assistance: Close supervision  Dynamic Sitting Balance  Dynamic Sitting-Balance Support: Feet supported  Dynamic Sitting-Level of Assistance: Close supervision  Standing Balance:  Static Standing Balance  Static Standing-Balance Support: Bilateral upper extremity supported  Static Standing-Level of Assistance: Contact guard  Dynamic Standing Balance  Dynamic Standing-Balance Support: Bilateral upper extremity supported  Dynamic Standing-Level of Assistance: Minimum assistance  Dynamic Standing-Balance: Turning   Sensation:  Light Touch: No apparent deficits  Sharp/Dull: No apparent deficits  Strength:  Strength Comments: BUEs grossly 4/5  Hand Function:  Gross Grasp: Functional  Coordination: Functional  Extremities: RUE   RUE : Within Functional Limits and LUE   LUE: Within Functional Limits    Outcome Measures:[facility] Daily Activity  Putting on and taking off regular lower body clothing: A little  Bathing (including washing, rinsing, drying): A little  Putting  on and taking off regular upper body clothing: A little  Toileting, which includes using toilet, bedpan or urinal: A little  Taking care of personal grooming such as brushing teeth: A little  Eating Meals: A little  Daily Activity - Total Score: 18    Education Documentation  Body Mechanics, taught by Britany Guerrero OT at 4/21/2025  2:25 PM.  Learner: Family, Patient  Readiness: Acceptance  Method: Explanation  Response: Needs Reinforcement, Verbalizes Understanding  Comment: ongoing education required for safety with ADL/ IADL due to impulsivity and poor safety awareness    Precautions, taught by Britany Guerrero OT at 4/21/2025  2:25 PM.  Learner: Family, Patient  Readiness: Acceptance  Method: Explanation  Response: Needs Reinforcement, Verbalizes Understanding  Comment: ongoing education required for safety with ADL/ IADL due to impulsivity and poor safety awareness    ADL Training, taught by Britany Guerrero OT at 4/21/2025  2:25 PM.  Learner: Family, Patient  Readiness: Acceptance  Method: Explanation  Response: Needs Reinforcement, Verbalizes Understanding  Comment: ongoing education required for safety with ADL/ IADL due to impulsivity and poor safety awareness    Education Comments  No comments found.        OP EDUCATION:       Goals:  Encounter Problems       Encounter Problems (Active)       ADLs       Patient will perform UB and LB bathing  with supervision level of assistance and raised toilet seat.       Start:  04/21/25    Expected End:  05/05/25            Patient with complete upper body dressing with modified independent level of assistance donning and doffing all UE clothes with PRN adaptive equipment while edge of bed        Start:  04/21/25    Expected End:  05/05/25            Patient with complete lower body dressing with modified independent level of assistance donning and doffing all LE clothes  with PRN adaptive equipment while edge of bed        Start:  04/21/25    Expected End:  05/05/25             Patient will complete daily grooming tasks brushing teeth and washing face/hair with modified independent level of assistance and PRN adaptive equipment while standing sinkside.       Start:  04/21/25    Expected End:  05/05/25            Patient will complete toileting including hygiene clothing management/hygiene with modified independent level of assistance and grab bars.       Start:  04/21/25    Expected End:  05/05/25               MOBILITY       Patient will perform Functional mobility mod  Household distances/Community Distances with modified independent level of assistance and least restrictive device in order to improve safety and functional mobility.       Start:  04/21/25    Expected End:  05/05/25               TRANSFERS       Patient will perform bed mobility independent level of assistance in order to improve safety and independence with mobility       Start:  04/21/25    Expected End:  05/05/25            Patient will complete functional transfer to chair with arms with least restrictive device with  modified independent level of assistance.       Start:  04/21/25    Expected End:  05/05/25

## 2025-04-21 NOTE — ASSESSMENT & PLAN NOTE
89-year-old lady with history of hypertension, hyperlipidemia, previous breast carcinoma in remission on anastrozole, diverticulosis of colon and asymptomatic cholelithiasis seen on CT scan of abdomen, history of seizure disorder and chronic lower back pain for which she has been taking tramadol presents with confusion and wobbly gait for the last 5 to 7 days and forgetfulness.  On examination she was noted to be intermittent confusion and left hemiataxia.    1.  Acute intermittent confusion could be medication related including tramadol which she has been taking 300 mg daily for many years for chronic back pain  However patient also has early dementia  Her confusion has resolved now ,she is very appropriate and according to daughter she is back to baseline.    2.  Left hemiataxia and wobbly gait in a patient who has a history of seizure disorder,  CT of the brain was not suggestive of any acute stroke.  Patient has been started on aspirin and will continue with atorvastatin  Seen by neurology other day and MRI of the brain was ordered which was completed which revealed no evidence of acute stroke.    3.  Hypertension, blood pressures are elevated, will resume metoprolol and patient was also on valsartan which could be resumed if blood pressure persistently remain elevated.    4.  History of breast carcinoma in remission resumed anastrozole    5.  Chronic back pain, CT of the lumbar spine revealed no fractures, does have significant arthritis and degenerative disc disease, continue Tylenol for pain as needed and hold tramadol for the time being.    6.  Seizure Disorder, Continue with Keppra.     PT OT.  Evaluated patient today.    Overall significant improvement in her condition since admission and MRI is negative for acute stroke and no bony injuries are noted.  Physical therapy is working on patient and patient started ambulating better and anticipate discharge home tomorrow.    Patient's daughter was in the room  who is updated with patient's condition.    Total time spent in examination counseling coordinating care for this patient was greater than 35 minutes.

## 2025-04-21 NOTE — ASSESSMENT & PLAN NOTE
89-year-old lady with history of hypertension, hyperlipidemia, previous breast carcinoma in remission on anastrozole, diverticulosis of colon and asymptomatic cholelithiasis seen on CT scan of abdomen, history of seizure disorder and chronic lower back pain for which she has been taking tramadol presents with confusion and wobbly gait for the last 5 to 7 days and forgetfulness.  On examination she was noted to be intermittent confusion and left hemiataxia.    1.  Acute intermittent confusion could be medication related including tramadol which she has been taking 300 mg daily for many years for chronic back pain  However early dementia is a possibility, will observe closely    2.  Left hemiataxia and wobbly gait in a patient who has a history of seizure disorder, needs further investigation as per neurology an MRI of the brain has been ordered, CT of the brain was not suggestive of any acute stroke.  Patient has been started on aspirin and will continue with atorvastatin    3.  Hypertension stable will monitor closely    4.  History of breast carcinoma in remission resume anastrozole    5.  Chronic back pain, CT of the lumbar spine revealed no fractures, does have significant arthritis and degenerative disc disease, will add Tylenol for pain as needed and hold tramadol for the time being.    6.  Seizure Disorder, Continue with Keppra.    Will order PT OT.    Neurology consultation appreciated, patient awaiting to have MRI of the brain, reviewed all labs and imaging studies including CT of abdomen and pelvis and CT brain.  Total time spent in examination counseling coordinating care for this patient was greater than 65 minutes.

## 2025-04-21 NOTE — PROGRESS NOTES
04/21/25 0814   Discharge Planning   Living Arrangements Alone  (lives home alone/ has support from family who live in same apt complex)   Support Systems Children;Family members   Type of Residence Private residence   Do you have animals or pets at home? No   Who is requesting discharge planning? Patient   Home or Post Acute Services None   Expected Discharge Disposition Home H   Does the patient need discharge transport arranged? Yes   RoundTrip coordination needed? Yes   Has discharge transport been arranged? No   Financial Resource Strain   How hard is it for you to pay for the very basics like food, housing, medical care, and heating? Not very   Housing Stability   In the last 12 months, was there a time when you were not able to pay the mortgage or rent on time? N   At any time in the past 12 months, were you homeless or living in a shelter (including now)? N   Transportation Needs   In the past 12 months, has lack of transportation kept you from medical appointments or from getting medications? no   In the past 12 months, has lack of transportation kept you from meetings, work, or from getting things needed for daily living? No   Patient Choice   Provider Choice list and CMS website (https://medicare.gov/care-compare#search) for post-acute Quality and Resource Measure Data were provided and reviewed with: Patient     Per notes patient was adm with confusion, I did call the patient's daughter Mary at [phone] she did confirm the patient lives at home alone, she does have support from family members who live in the same aprt complex, patient does not use a cane or walker or home oxygen. Patient was mostly independent prior to the last few weeks, Neuro consulted along with PT/OT , I will continue to monitor for discharge planing and home going needs, possible need for HC on discharge.

## 2025-04-21 NOTE — PROGRESS NOTES
Physical Therapy    Physical Therapy Evaluation    Patient Name: Ann Marie Murphy  MRN: [id number]  Department: [place] [address]  Room: [address] [place] [address]  Today's Date: 4/21/2025   Time Calculation  Start Time: 1019  Stop Time: 1047  Time Calculation (min): 28 min    Assessment/Plan   PT Assessment  PT Assessment Results: Decreased strength, Decreased endurance, Impaired balance, Decreased mobility, Decreased cognition, Impaired judgement, Decreased safety awareness  Rehab Prognosis: Good  Barriers to Discharge Home: Caregiver assistance, Physical needs, Cognition needs  Caregiver Assistance: Patient lives alone and/or does not have reliable caregiver assistance  Cognition Needs: 24hr supervision for safety awareness needed, Insight of patient limited regarding functional ability/needs, Cognition-related high falls risk  Physical Needs: Stair navigation into home limited by function/safety, In-home setup navigation limited by function/safety, 24hr mobility assistance needed, Ambulating household distances limited by function/safety, 24hr ADL assistance needed, High falls risk due to function or environment  End of Session Communication: Bedside nurse  Assessment Comment: PT Evaluation Completed. The patient presented with decreased mobility, balance, endurance, safety awareness, and increased impulsivity, cognitive deficits, and fatigue. These impairments are negatively impacting her ability to perform at baseline functional level, in home environment. The patient is at risk of falls & injury. This patient would benefit from skilled therapy intervention to address limitations and progress towards the PT goals.  Anticipate moderate frequency PT needs at discharge  End of Session Patient Position: Up in chair, Alarm on (Daughter present)  IP OR SWING BED PT PLAN  Inpatient or Swing Bed: Inpatient  PT Plan  Treatment/Interventions: Bed mobility, Transfer training, Gait training, Balance training, Strengthening, Endurance training,  Range of motion, Therapeutic exercise, Therapeutic activity, Home exercise program, Stair training  PT Plan: Ongoing PT  PT Frequency: 3 times per week  PT Discharge Recommendations: Moderate intensity level of continued care  PT Recommended Transfer Status: Assist x1  PT - OK to Discharge: Yes (PT POC established)    Subjective   General Visit Information:  General  Reason for Referral: To ED with new onset full body shaking and confusion. Pt also reporting abdominal pain with imaging showing cholelithiasis. Brain imaging (-) for acute processes.  Referred By: James Galindo MD  Past Medical History Relevant to Rehab: Medical History[1]    Prior to Session Communication: Bedside nurse  Patient Position Received: Bed, 3 rail up, Alarm on  General Comment: Pt confused but agreeable to PT evaL. Pt has a very difficult time staying on task and jumps from topic to topic in conversation. Pt very impulsive with very poor safety awareness. Daughter present at end of session to confirm home setup and PLOF  Home Living:  Home Living  Type of Home: Apartment  Lives With: Alone (Reports apartment is split into 4 suites with family living in the other 3 units)  Home Adaptive Equipment: Walker rolling or standard, Cane  Home Layout: One level  Home Access: Stairs to enter with rails  Entrance Stairs-Number of Steps: 24 (2 flights, per dtr pt typically completes independently.)  Bathroom Shower/Tub: Tub/shower unit  Bathroom Equipment: Grab bars in shower  Prior Level of Function:  Prior Function Per Pt/Caregiver Report  Level of [place]: Independent with ADLs and functional transfers, Independent with homemaking with ambulation  Receives Help From:  (Family available to assist as needed however pt very self sufficient at baseline per daughter.)  ADL Assistance: Independent  Homemaking Assistance: Independent  Ambulatory Assistance: Independent (Using RW recently, no AD at baseline)  Prior Function Comments: Does not  drive. Daughter assists with providing history. Pt frequently goes on unrelated tangents when asked simple questions.  Precautions:  Precautions  Hearing/Visual Limitations: [place]  Medical Precautions: Fall precautions      Date/Time Vitals Session Patient Position Pulse Resp SpO2 BP MAP (mmHg)    04/21/25 1244 --  --  110  20  97 %  136/72  87                 Objective   Pain:  Pain Assessment  Pain Assessment: 0-10  0-10 (Numeric) Pain Score: 0 - No pain  Cognition:  Cognition  Overall Cognitive Status: Impaired  Orientation Level: Disoriented to situation, Disoriented to time  Safety Judgment: Decreased awareness of need for safety precautions  Attention: Exceptions to WFL  Alternating Attention: Impaired  Divided Attention: Impaired  Sustained Attention: Impaired  Other (Comment): Severe attention deficits despite max effort to redirect and stay on task  Memory: Exceptions to WFL  Long-Term Memory: Impaired  Short-Term Memory: Impaired  Problem Solving: Exceptions to WFL  Safety/Judgement: Exceptions to WFL  Insight: Moderate  Impulsive: Severely    General Assessments:  Activity Tolerance  Endurance: Tolerates 10 - 20 min exercise with multiple rests    Sensation  Light Touch: No apparent deficits         Postural Control  Postural Control: Impaired    Static Sitting Balance  Static Sitting-Balance Support: Feet supported, Bilateral upper extremity supported  Static Sitting-Level of Assistance: Close supervision  Dynamic Sitting Balance  Dynamic Sitting-Balance Support: Feet supported, Bilateral upper extremity supported  Dynamic Sitting-Level of Assistance: Close supervision    Static Standing Balance  Static Standing-Balance Support: Bilateral upper extremity supported  Static Standing-Level of Assistance: Contact guard  Dynamic Standing Balance  Dynamic Standing-Balance Support: Bilateral upper extremity supported  Dynamic Standing-Level of Assistance: Minimum assistance  Functional Assessments:  Bed  Mobility  Bed Mobility: Yes  Bed Mobility 1  Bed Mobility 1: Supine to sitting  Level of Assistance 1: Close supervision  Bed Mobility Comments 1: Able to sit up into long sitting and pivot legs over EOB.    Transfers  Transfer: Yes  Transfer 1  Technique 1: Sit to stand, Stand to sit  Transfer Device 1: Walker  Transfer Level of Assistance 1: Contact guard  Trials/Comments 1: Max cues for hand placement and reaching back prior to sitting. Pt unsteady and leaning on bed/chair initially upon standing.    Ambulation/Gait Training  Ambulation/Gait Training Performed: Yes  Ambulation/Gait Training 1  Surface 1: Level tile  Device 1: Rolling walker  Assistance 1: Minimum assistance, Moderate assistance  Comments/Distance (ft) 1: 75 ft. Varying min-mod A due to frequent LOB. Pt ambulates with step through gait however will suddenly stop and lose balance without warning. Max cues for walker placement and to stay within the walker. Pt is very impulsively with difficulty following cues. Very unsteady\ with very poor safety awareness  Extremity/Trunk Assessments:  RUE   RUE : Within Functional Limits  LUE   LUE: Within Functional Limits  RLE   RLE :  (Strength >3/5 based on observation of functional mobility. ROM WFL.)  LLE   LLE :  (Strength >3/5 based on observation of functional mobility. ROM WFL.)  Outcome Measures:  [facility] Basic Mobility  Turning from your back to your side while in a flat bed without using bedrails: A little  Moving from lying on your back to sitting on the side of a flat bed without using bedrails: A little  Moving to and from bed to chair (including a wheelchair): A little  Standing up from a chair using your arms (e.g. wheelchair or bedside chair): A little  To walk in hospital room: A lot  Climbing 3-5 steps with railing: A lot  Basic Mobility - Total Score: 16    Tinetti  Sitting Balance: Steady, safe  Arises: Able without using arms  Attempts to Arise: Able to arise, one attempt  Immediate Standing  Balance (First 5 Seconds): Steady but uses walker or other support  Standing Balance: Steady but wide stance, uses cane or other support  Nudged: Begins to fall  Eyes Closed: Unsteady  Turned 360 Degrees: Steadiness: Unsteady (Grabs, staggers)  Turned 360 Degrees: Continuity of Steps: Continuous  Sitting Down: Safe, smooth motion  Balance Score: 10  Initiation of Gait: No hesitancy  Step Height: R Swing Foot: Right foot complete clears floor  Step Length: R Swing Foot: Passes left stance foot  Step Height: L Swing Foot: Left foot complete clears floor  Step Length: L Swing Foot: Passes right stance foot  Step Symmetry: Right and left step appear equal  Step Continuity: Stopping or discontinuity between steps  Path: Marked deviation  Trunk: Marked sway or uses walking aid  Walking Time: Heels almost touching while walking  Gait Score: 7  Total Score: 17\    The Tinetti is used to assess older adult’s gait and balance abilities. The scoring is as follows: 25-28 = low fall risk, 19-24 = medium fall risk, < 19 = high fall risk. With this patient scoring 17/28, this indicates that she is at a high fall risk.       Encounter Problems       Encounter Problems (Active)       Balance       complete all mobility with normal balance while dual tasking, negotiating in a dynamic environment, carrying items, etc., with proactive and reactive static and dynamic standing and sitting tasks, with mod I and LRAD, >15 minutes.         Start:  04/21/25    Expected End:  05/05/25            Pt will scores >/= to 24/28 on the Tinetti Balance assessement in order to demonstrate decreased risk of falls.        Start:  04/21/25    Expected End:  05/05/25               Mobility       STG - Patient will ambulate 150 ft mod I using LRAD       Start:  04/21/25    Expected End:  05/05/25            STG - Patient will ascend and descend a flight of stairs mod I using unilateral HR and LRAD       Start:  04/21/25    Expected End:  05/05/25                PT Transfers       STG - Patient will perform bed mobility independently.        Start:  04/21/25    Expected End:  05/05/25            STG - Patient will transfer sit to and from stand mod I using a LRAD       Start:  04/21/25    Expected End:  05/05/25               Pain - Adult              Education Documentation  Body Mechanics, taught by Marilin Agudelo, PT at 4/21/2025  2:26 PM.  Learner: Patient  Readiness: Acceptance  Method: Explanation  Response: Needs Reinforcement    Mobility Training, taught by Marilin Agudelo PT at 4/21/2025  2:26 PM.  Learner: Patient  Readiness: Acceptance  Method: Explanation  Response: Needs Reinforcement    Education Comments  No comments found.                 [1]   Past Medical History:  Diagnosis Date    Anxiety disorder due to known physiological condition     Anxiety disorder due to a general medical condition    Breast cancer     Cardiomyopathy, unspecified     Cardiomyopathy    Diverticulosis of intestine, part unspecified, without perforation or abscess without bleeding     Diverticulosis    Encounter for screening mammogram for malignant neoplasm of breast     Visit for screening mammogram    Localization-related (focal) (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, not intractable, without status epilepticus     Complex partial seizure    Other conditions influencing health status     Ovarian Torsion On The Left    Other conditions influencing health status     Stroke syndrome    Other specified noninflammatory disorders of uterus     Fibrosis of uterus    Personal history of other diseases of the circulatory system     History of hypertension    Personal history of other diseases of the circulatory system     History of hypertrophic cardiomyopathy    Personal history of other diseases of the digestive system     History of cholelithiasis    Personal history of other diseases of the digestive system     History of  hemorrhoids    Personal history of other diseases of the digestive system     History of hiatal hernia    Personal history of other diseases of the digestive system     History of esophageal reflux    Personal history of other diseases of the female genital tract 01/14/2020    History of breast pain    Personal history of other endocrine, nutritional and metabolic disease     History of hypercholesterolemia    Personal history of other endocrine, nutritional and metabolic disease 03/24/2016    History of Cushing's syndrome    Personal history of transient ischemic attack (TIA), and cerebral infarction without residual deficits     History of transient cerebral ischemia    Unspecified convulsions (Multi)     Generalized convulsive seizure

## 2025-04-21 NOTE — PROGRESS NOTES
Medicine PAHELIO follow up note    Subjective:  Pt seen with daughter at bedside. She is answering some questions appropriately but is still confused. Daughter reporting she has improved greatly since arrival to hospital but has still not returned to her baseline. Pt is normally independent and able to care for self      Additional information:      Vitals (Last 24 Hours):  Heart Rate:  [103-111]   Temp:  [36.4 °C (97.6 °F)-36.7 °C (98.1 °F)]   Resp:  [16-20]   BP: (109-169)/()   SpO2:  [96 %-98 %]       I have reviewed all imaging reports and labs pertinent to this visit / presenting problem    PHYSICAL EXAM:  Constitutional: NAD, cooperative  Eyes: no icterus  ENMT: mucous membranes moist, no lesions  Head/Neck: supple  Respiratory/Thorax: CTA bilaterally, non-labored breathing, no cough, on RA  Cardiovascular: RRR, + systolic murmur   Gastrointestinal: ND/S/NT  : no Haile, no SP/flank discomfort  Musculoskeletal: no joint swelling, ROM intact  Extremities: no edema  Neurological: A&Ox2, not oriented to situation   Skin: warm and dry  Psych: calm, stable mood     MEDS:  Scheduled meds  Scheduled Medications[1]    Continuous meds  Continuous Medications[2]    PRN meds  PRN Medications[3]      ASSESSMENT/PLAN:  Ann Marie Murphy is an 89 year old female with PMHx of HTN, hyperlipidemia, breast ca on anastrozole, diverticulosis, seizure disorder, and chronic low back pain who presented to [facility] ED with confusion and tremulous activity. ED work up was largely unremarkable and pt was admitted for ongoing evaluation and management of her symptoms.     Acute encephalopathy   - No signs of infection: Flu/COVID negative, UA negative, afebrile, no leukocytosis   - CTH - age related degenerative changes, no acute findings   - CT abd/pel - Cholelithiasis, diverticulosis, hepatic hemangioma, small L inguinal hernia (nonincarcerated), otherwise no acute findings   - MRI brain - negative for acute change   - Pt has been  taking Tramadol daily for 15+ years (currently 100mg TID) , suspect withdrawal or overuse could have caused symptoms   - Seen by neurology - recommending ASA 81mg daily for stroke prophylaxis, addiction medicine consult, psychiatry consult. Will hold off on psychiatry consult for now with improvement in mentation. Consider OP follow up with addiction medicine     Chronic back pain   - Currently prescribed Tramadol as above and has been taking for 15+ years  - Consider weaning/stopping this (not currently ordered for admission), risk likely outweighs benefit    HTN   - BP variable this admission but overall stable   - Home valsartan not ordered currently, restarted home metoprolol due to tachycardia     Seizure disorder   - Continue home Keppra XR  - Keppra level WNL     Hx of breast cancer   - Continue home anastrozole     Other comorbidities as above  - Continue medications as ordered and adjust based on clinical course     VTE / GI prophylaxis   - Subcutaneous Lovenox, PPI, bowel regimen in place     Discharge planning  - PT/OT rec mod   - Possible [facility]     Discussed with Dr. Galindo and the interdisciplinary team     Cara Saleh PA-C            [1] anastrozole, 1 mg, oral, Daily  aspirin, 81 mg, oral, Daily  atorvastatin, 10 mg, oral, Nightly  cholecalciferol, 50 mcg, oral, Daily  enoxaparin, 40 mg, subcutaneous, q24h  levETIRAcetam XR, 500 mg, oral, BID  mirtazapine, 7.5 mg, oral, Nightly  pantoprazole, 40 mg, oral, Daily before breakfast  polyethylene glycol, 17 g, oral, Daily    [2]    [3] PRN medications: acetaminophen, ondansetron **OR** ondansetron

## 2025-04-21 NOTE — PROGRESS NOTES
Pharmacy Medication History     Source of Information: PATIENT'S DAUGHTER    Additional concerns with the patient's PTA list.     Notified Provider via Haiku : No    The following updates were made to the Prior to Admission medication list:     Medications ADDED:   N/A  Medications CHANGED:  N/A  Medications REMOVED:   FAMOTIDINE 20 MG  FLONASE 50 MCG NASAL SPRAY  LACTOBACILLUS ACIDOPHILUS  KEPPRA  MG  MIDODRINE 10 MG  NALOXONE 4 MG NASAL SPRAY   RABEPRAZOLE 20 MG  TRAMADOL 50 MG  Medications NOT TAKING:   FAMOTIDINE 20 MG  FLONASE 50 MCG NASAL SPRAY  LACTOBACILLUS ACIDOPHILUS  KEPPRA  MG  MIDODRINE 10 MG  NALOXONE 4 MG NASAL SPRAY   RABEPRAZOLE 20 MG  TRAMADOL 50 MG    Allergy reviewed : Yes    Meds 2 Beds : Yes    Outpatient pharmacy confirmed and updated in chart : Yes    [facility]    The list below reflectives the updated PTA list. Please review each medication in order reconciliation for additional clarification and justification.    Prior to Admission Medications   Prescriptions Last Dose                                acetaminophen (Tylenol) 325 mg tablet Past Week      Sig: Take 2 tablets (650 mg) by mouth every 4 hours if needed for mild pain (1 - 3) or moderate pain (4 - 6).   anastrozole (Arimidex) 1 mg tablet 4/18/2025 Morning      Sig: Take 1 tablet (1 mg total) by mouth once daily.   ascorbic acid, vitamin C, 500 mg capsule 4/18/2025 Morning      Sig: Take 1 capsule by mouth once daily.   aspirin 81 mg EC tablet 4/18/2025 Morning      Sig: Take 1 tablet (81 mg) by mouth once daily.   atorvastatin (Lipitor) 10 mg tablet 4/18/2025 Bedtime      Sig: Take 1 tablet (10 mg) by mouth once daily at bedtime.   cholecalciferol (Vitamin D-3) 50 MCG (2000 UT) tablet 4/18/2025 Morning      Sig: Take 1 tablet (50 mcg) by mouth once daily.                             levETIRAcetam (Keppra) 500 mg tablet 4/18/2025 Morning      Sig: TAKE 2 TABLETS BY MOUTH DAILY                            "  metoprolol succinate XL (Toprol-XL) 100 mg 24 hr tablet 4/18/2025 Morning      Sig: TAKE 1 TABLET(100 MG) BY MOUTH EVERY DAY                                                       valsartan (Diovan) 320 mg tablet 4/18/2025 Morning      Sig: Take 1 tablet (320 mg) by mouth once daily.      Facility-Administered Medications: None       The list below reflectives the updated allergy list. Please review each documented allergy for additional clarification and justification.    Allergies   Allergen Reactions    Adhesive Tape-Silicones Unknown    Cortisone Swelling    Diphenhydramine Hallucinations and Other     \"goes out of head\"    Latex Unknown    Morphine Unknown          04/21/25 at 3:35 PM - Lindsay Guerrero     "

## 2025-04-22 LAB
ANION GAP SERPL CALC-SCNC: 10 MMOL/L (ref 10–20)
BUN SERPL-MCNC: 22 MG/DL (ref 6–23)
CALCIUM SERPL-MCNC: 10.1 MG/DL (ref 8.6–10.3)
CHLORIDE SERPL-SCNC: 113 MMOL/L (ref 98–107)
CO2 SERPL-SCNC: 26 MMOL/L (ref 21–32)
CREAT SERPL-MCNC: 0.74 MG/DL (ref 0.5–1.05)
EGFRCR SERPLBLD CKD-EPI 2021: 78 ML/MIN/1.73M*2
ERYTHROCYTE [DISTWIDTH] IN BLOOD BY AUTOMATED COUNT: 13.6 % (ref 11.5–14.5)
GLUCOSE SERPL-MCNC: 101 MG/DL (ref 74–99)
HCT VFR BLD AUTO: 41.1 % (ref 36–46)
HGB BLD-MCNC: 13 G/DL (ref 12–16)
MCH RBC QN AUTO: 31 PG (ref 26–34)
MCHC RBC AUTO-ENTMCNC: 31.6 G/DL (ref 32–36)
MCV RBC AUTO: 98 FL (ref 80–100)
NRBC BLD-RTO: 0 /100 WBCS (ref 0–0)
PLATELET # BLD AUTO: 203 X10*3/UL (ref 150–450)
POTASSIUM SERPL-SCNC: 4 MMOL/L (ref 3.5–5.3)
RBC # BLD AUTO: 4.2 X10*6/UL (ref 4–5.2)
SODIUM SERPL-SCNC: 145 MMOL/L (ref 136–145)
WBC # BLD AUTO: 9.5 X10*3/UL (ref 4.4–11.3)

## 2025-04-22 PROCEDURE — 2500000001 HC RX 250 WO HCPCS SELF ADMINISTERED DRUGS (ALT 637 FOR MEDICARE OP): Performed by: INTERNAL MEDICINE

## 2025-04-22 PROCEDURE — 97116 GAIT TRAINING THERAPY: CPT | Mod: GP,CQ

## 2025-04-22 PROCEDURE — 1100000001 HC PRIVATE ROOM DAILY

## 2025-04-22 PROCEDURE — 36415 COLL VENOUS BLD VENIPUNCTURE: CPT

## 2025-04-22 PROCEDURE — 2500000001 HC RX 250 WO HCPCS SELF ADMINISTERED DRUGS (ALT 637 FOR MEDICARE OP)

## 2025-04-22 PROCEDURE — 99232 SBSQ HOSP IP/OBS MODERATE 35: CPT | Performed by: INTERNAL MEDICINE

## 2025-04-22 PROCEDURE — 2500000002 HC RX 250 W HCPCS SELF ADMINISTERED DRUGS (ALT 637 FOR MEDICARE OP, ALT 636 FOR OP/ED)

## 2025-04-22 PROCEDURE — 80048 BASIC METABOLIC PNL TOTAL CA: CPT

## 2025-04-22 PROCEDURE — 2500000004 HC RX 250 GENERAL PHARMACY W/ HCPCS (ALT 636 FOR OP/ED)

## 2025-04-22 PROCEDURE — 85027 COMPLETE CBC AUTOMATED: CPT

## 2025-04-22 PROCEDURE — 2500000004 HC RX 250 GENERAL PHARMACY W/ HCPCS (ALT 636 FOR OP/ED): Performed by: INTERNAL MEDICINE

## 2025-04-22 RX ORDER — OLANZAPINE 2.5 MG/1
5 TABLET, FILM COATED ORAL ONCE
Status: COMPLETED | OUTPATIENT
Start: 2025-04-22 | End: 2025-04-22

## 2025-04-22 RX ORDER — OLANZAPINE 10 MG/2ML
5 INJECTION, POWDER, FOR SOLUTION INTRAMUSCULAR EVERY 6 HOURS PRN
Status: DISCONTINUED | OUTPATIENT
Start: 2025-04-22 | End: 2025-04-23

## 2025-04-22 RX ADMIN — Medication 50 MCG: at 09:56

## 2025-04-22 RX ADMIN — METOPROLOL SUCCINATE 100 MG: 50 TABLET, EXTENDED RELEASE ORAL at 09:56

## 2025-04-22 RX ADMIN — ANASTROZOLE 1 MG: 1 TABLET, COATED ORAL at 09:56

## 2025-04-22 RX ADMIN — LEVETIRACETAM 500 MG: 500 TABLET, FILM COATED, EXTENDED RELEASE ORAL at 09:58

## 2025-04-22 RX ADMIN — OLANZAPINE 5 MG: 2.5 TABLET, FILM COATED ORAL at 03:41

## 2025-04-22 RX ADMIN — OLANZAPINE 5 MG: 10 INJECTION, POWDER, FOR SOLUTION INTRAMUSCULAR at 19:34

## 2025-04-22 RX ADMIN — PANTOPRAZOLE SODIUM 40 MG: 40 TABLET, DELAYED RELEASE ORAL at 09:56

## 2025-04-22 RX ADMIN — ENOXAPARIN SODIUM 40 MG: 40 INJECTION SUBCUTANEOUS at 16:41

## 2025-04-22 RX ADMIN — ASPIRIN 81 MG: 81 TABLET, COATED ORAL at 09:56

## 2025-04-22 ASSESSMENT — COGNITIVE AND FUNCTIONAL STATUS - GENERAL
MOVING TO AND FROM BED TO CHAIR: A LOT
STANDING UP FROM CHAIR USING ARMS: A LITTLE
CLIMB 3 TO 5 STEPS WITH RAILING: A LOT
STANDING UP FROM CHAIR USING ARMS: A LITTLE
TURNING FROM BACK TO SIDE WHILE IN FLAT BAD: A LITTLE
MOBILITY SCORE: 16
DAILY ACTIVITIY SCORE: 20
DRESSING REGULAR UPPER BODY CLOTHING: A LITTLE
CLIMB 3 TO 5 STEPS WITH RAILING: A LOT
CLIMB 3 TO 5 STEPS WITH RAILING: A LOT
TURNING FROM BACK TO SIDE WHILE IN FLAT BAD: A LITTLE
WALKING IN HOSPITAL ROOM: A LOT
CLIMB 3 TO 5 STEPS WITH RAILING: A LOT
WALKING IN HOSPITAL ROOM: A LOT
DAILY ACTIVITIY SCORE: 20
STANDING UP FROM CHAIR USING ARMS: A LITTLE
DRESSING REGULAR UPPER BODY CLOTHING: A LITTLE
MOBILITY SCORE: 16
MOVING TO AND FROM BED TO CHAIR: A LITTLE
MOBILITY SCORE: 16
TURNING FROM BACK TO SIDE WHILE IN FLAT BAD: A LITTLE
TOILETING: A LITTLE
MOBILITY SCORE: 16
TURNING FROM BACK TO SIDE WHILE IN FLAT BAD: A LITTLE
TOILETING: A LITTLE
MOVING FROM LYING ON BACK TO SITTING ON SIDE OF FLAT BED WITH BEDRAILS: A LITTLE
MOVING TO AND FROM BED TO CHAIR: A LOT
MOVING TO AND FROM BED TO CHAIR: A LOT
WALKING IN HOSPITAL ROOM: A LOT
HELP NEEDED FOR BATHING: A LITTLE
WALKING IN HOSPITAL ROOM: A LOT
DRESSING REGULAR LOWER BODY CLOTHING: A LITTLE
HELP NEEDED FOR BATHING: A LITTLE
DRESSING REGULAR LOWER BODY CLOTHING: A LITTLE
STANDING UP FROM CHAIR USING ARMS: A LITTLE

## 2025-04-22 ASSESSMENT — PAIN - FUNCTIONAL ASSESSMENT: PAIN_FUNCTIONAL_ASSESSMENT: 0-10

## 2025-04-22 ASSESSMENT — PAIN SCALES - GENERAL
PAINLEVEL_OUTOF10: 0 - NO PAIN
PAINLEVEL_OUTOF10: 8

## 2025-04-22 NOTE — PROGRESS NOTES
Care Coordinator Note:    Plan:Patient in with confusion. Improving. CT head and MRI is negative. PT OT rec MOD. Dtr emailed snf list by LEONARDO. SW to follow up on choices. No auth needed. Med ready today.     Status: inpatient  Payor: medicare  Disposition: NEW SNF- dtr reviewing.  No auth needed  Barrier: med ready. Needs accepting snf  ADOD: today vs tomorrow.     Yamile Almanzakolowski [facility]      04/22/25 1417   Discharge Planning   Expected Discharge Disposition SNF   Patient Choice   Provider Choice list and CMS website (https://medicare.gov/care-compare#search) for post-acute Quality and Resource Measure Data were provided and reviewed with: Family   Patient / Family choosing to utilize agency / facility established prior to hospitalization Yes   Intensity of Service   Intensity of Service 0-30 min

## 2025-04-22 NOTE — PROGRESS NOTES
04/22/25 1237   Discharge Planning   Home or Post Acute Services Post acute facilities (Rehab/SNF/etc)   Type of Post Acute Facility Services Skilled nursing   Expected Discharge Disposition SNF   Does the patient need discharge transport arranged? Yes   RoundTrip coordination needed? Yes   Has discharge transport been arranged? No     Met with patient at bedside. Attempted to discuss discharge planning, she requested I call her daughter. SW called, Mary. She is agreeable to SNF placement. SNF list sent via email to [email or web address], SNF list also left at bedside. SW will follow.    Addendum: met with patient's daughter at bedside. Discussed SNF vs Home. Daughter feels snf will be more appropriate for patient at this time. She will identify choices tonight and will leave list in room. SW will follow.

## 2025-04-22 NOTE — PROGRESS NOTES
Physical Therapy    Physical Therapy Treatment    Patient Name: Ann Marie Murphy  MRN: [id number]  Department: [facility] [id number]  Room: [address] [place] [address]  Today's Date: 4/22/2025  Time Calculation  Start Time: 1139  Stop Time: 1155  Time Calculation (min): 16 min         Assessment/Plan   PT Assessment  Rehab Prognosis: Good  Barriers to Discharge Home: Caregiver assistance, Physical needs, Cognition needs  Caregiver Assistance: Patient lives alone and/or does not have reliable caregiver assistance  Cognition Needs: 24hr supervision for safety awareness needed, Insight of patient limited regarding functional ability/needs, Cognition-related high falls risk  Physical Needs: Stair navigation into home limited by function/safety, In-home setup navigation limited by function/safety, 24hr mobility assistance needed, Ambulating household distances limited by function/safety, 24hr ADL assistance needed, High falls risk due to function or environment  End of Session Communication: Bedside nurse  Assessment Comment: Pt demonstrates significant confusion and requires repeated, one step commands. Pt demonstrates multiple LOB while ambulating. Re-entered pt room due to pt out of bed, bed alarm very quiet, RN notified and chair alarm placed in bed.  End of Session Patient Position: Bed, 3 rail up, Alarm on  PT Plan  Inpatient/Swing Bed or Outpatient: Inpatient  PT Plan  Treatment/Interventions: Bed mobility, Transfer training, Gait training  PT Plan: Ongoing PT  PT Frequency: 3 times per week  PT Discharge Recommendations: Moderate intensity level of continued care  PT Recommended Transfer Status: Assist x1  PT - OK to Discharge: Yes (per POC)      General Visit Information:   PT  Visit  PT Received On: 04/22/25  General  Reason for Referral: To ED with new onset full body shaking and confusion. Pt also reporting abdominal pain with imaging showing cholelithiasis. Brain imaging (-) for acute processes.  Referred By: James Galindo MD  Past  Medical History Relevant to Rehab: Medical History[1]    Prior to Session Communication: Bedside nurse  Patient Position Received: Bed, 3 rail up, Alarm on    Subjective   Precautions:  Precautions  Hearing/Visual Limitations: [place]  Medical Precautions: Fall precautions            Objective   Pain:  Pain Assessment  Pain Assessment: 0-10  0-10 (Numeric) Pain Score: 8 (RN notified)  Pain Type: Acute pain  Pain Location: Back  Pain Orientation: Lower  Cognition:  Cognition  Orientation Level: Disoriented to time, Disoriented to situation       Postural Control:  Static Sitting Balance  Static Sitting-Balance Support: Feet supported, Bilateral upper extremity supported  Static Sitting-Level of Assistance: Close supervision  Static Standing Balance  Static Standing-Balance Support: Bilateral upper extremity supported  Static Standing-Level of Assistance: Contact guard       Treatments:  Therapeutic Exercise  Therapeutic Exercise Performed: Yes  Therapeutic Exercise Activity 1: Pt performed seated x15 reps LAQ and alternating marches. Pt required max VC for pacing and technique.         Bed Mobility  Bed Mobility: Yes  Bed Mobility 1  Bed Mobility 1: Supine to sitting, Sitting to supine  Level of Assistance 1: Close supervision  Bed Mobility Comments 1: HOB elevated. Pt required no use of bed rail for trunk elevation or trunk control.  Bed Mobility 2  Bed Mobility  2: Scooting  Level of Assistance 2: Dependent, +2  Bed Mobility Comments 2: to scoot towards HOB with use of draw sheet    Ambulation/Gait Training  Ambulation/Gait Training Performed: Yes  Ambulation/Gait Training 1  Surface 1: Level tile  Device 1: Rolling walker  Gait Support Devices: Gait belt  Assistance 1: Minimum assistance, Moderate assistance  Quality of Gait 1: Diminished heel strike, Inconsistent stride length, Decreased step length, Shuffling gait, Forward flexed posture, Soft knee(s), Antalgic  Comments/Distance (ft) 1: 20 (Pt demonstrating  frequent LOB and poor WW safety and management. Pt requires  min to modA for fall prevention, upright posture, and WW management.)  Transfers  Transfer: Yes  Transfer 1  Transfer From 1: Bed to  Transfer to 1: Stand  Technique 1: Sit to stand, Stand to sit  Transfer Device 1: Walker, Gait belt  Transfer Level of Assistance 1: Contact guard  Trials/Comments 1: Max VC for correct hand placement and safety    Outcome Measures:  [facility] Basic Mobility  Turning from your back to your side while in a flat bed without using bedrails: A little  Moving from lying on your back to sitting on the side of a flat bed without using bedrails: A little  Moving to and from bed to chair (including a wheelchair): A little  Standing up from a chair using your arms (e.g. wheelchair or bedside chair): A little  To walk in hospital room: A lot  Climbing 3-5 steps with railing: A lot  Basic Mobility - Total Score: 16    Education Documentation  Body Mechanics, taught by Arlen Colorado PTA at 4/22/2025  1:59 PM.  Learner: Patient  Readiness: Acceptance  Method: Explanation  Response: No Evidence of Learning    Mobility Training, taught by Arlen Colorado PTA at 4/22/2025  1:59 PM.  Learner: Patient  Readiness: Acceptance  Method: Explanation  Response: No Evidence of Learning    Education Comments  No comments found.        Encounter Problems       Encounter Problems (Active)       Balance       complete all mobility with normal balance while dual tasking, negotiating in a dynamic environment, carrying items, etc., with proactive and reactive static and dynamic standing and sitting tasks, with mod I and LRAD, >15 minutes.   (Not Progressing)       Start:  04/21/25    Expected End:  05/05/25            Pt will scores >/= to 24/28 on the [employer] Balance assessement in order to demonstrate decreased risk of falls.        Start:  04/21/25    Expected End:  05/05/25               Mobility       STG - Patient will ambulate 150 ft mod I  using LRAD (Not Progressing)       Start:  04/21/25    Expected End:  05/05/25            STG - Patient will ascend and descend a flight of stairs mod I using unilateral HR and LRAD (Not Progressing)       Start:  04/21/25    Expected End:  05/05/25               PT Transfers       STG - Patient will perform bed mobility independently.  (Not Progressing)       Start:  04/21/25    Expected End:  05/05/25            STG - Patient will transfer sit to and from stand mod I using a LRAD (Not Progressing)       Start:  04/21/25    Expected End:  05/05/25               Pain - Adult                   [1]   Past Medical History:  Diagnosis Date    Anxiety disorder due to known physiological condition     Anxiety disorder due to a general medical condition    Breast cancer     Cardiomyopathy, unspecified     Cardiomyopathy    Diverticulosis of intestine, part unspecified, without perforation or abscess without bleeding     Diverticulosis    Encounter for screening mammogram for malignant neoplasm of breast     Visit for screening mammogram    Localization-related (focal) (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, not intractable, without status epilepticus     Complex partial seizure    Other conditions influencing health status     Ovarian Torsion On The Left    Other conditions influencing health status     Stroke syndrome    Other specified noninflammatory disorders of uterus     Fibrosis of uterus    Personal history of other diseases of the circulatory system     History of hypertension    Personal history of other diseases of the circulatory system     History of hypertrophic cardiomyopathy    Personal history of other diseases of the digestive system     History of cholelithiasis    Personal history of other diseases of the digestive system     History of hemorrhoids    Personal history of other diseases of the digestive system     History of hiatal hernia    Personal history  of other diseases of the digestive system     History of esophageal reflux    Personal history of other diseases of the female genital tract 01/14/2020    History of breast pain    Personal history of other endocrine, nutritional and metabolic disease     History of hypercholesterolemia    Personal history of other endocrine, nutritional and metabolic disease 03/24/2016    History of Cushing's syndrome    Personal history of transient ischemic attack (TIA), and cerebral infarction without residual deficits     History of transient cerebral ischemia    Unspecified convulsions (Multi)     Generalized convulsive seizure

## 2025-04-22 NOTE — ASSESSMENT & PLAN NOTE
89-year-old lady with history of hypertension, hyperlipidemia, previous breast carcinoma in remission on anastrozole, diverticulosis of colon and asymptomatic cholelithiasis seen on CT scan of abdomen, history of seizure disorder and chronic lower back pain for which she has been taking tramadol presents with confusion and wobbly gait for the last 5 to 7 days and forgetfulness.  On examination she was noted to be intermittent confusion and left hemiataxia.    1.  Acute intermittent confusion could be medication related including tramadol which she has been taking 300 mg daily for many years for chronic back pain  However patient also has early dementia  Her confusion has resolved now ,she is very appropriate and  she is back to baseline.    2.  Left hemiataxia and wobbly gait in a patient who has a history of seizure disorder,  CT of the brain was not suggestive of any acute stroke.  Patient has been started on aspirin and will continue with atorvastatin  Seen by neurology other day and MRI of the brain was ordered which was completed which revealed no evidence of acute stroke.    3.  Hypertension, blood pressures are elevated, will resume metoprolol and patient was also on valsartan which could be resumed if blood pressure persistently remain elevated.    4.  History of breast carcinoma in remission resumed anastrozole    5.  Chronic back pain, CT of the lumbar spine revealed no fractures, does have significant arthritis and degenerative disc disease, continue Tylenol for pain as needed and hold tramadol for the time being.    6.  Seizure Disorder, Continue with Keppra.     PT OT.  Evaluated patient today.    Overall significant improvement in her condition since admission and MRI is negative for acute stroke and no bony injuries are noted.  Physical therapy is working on patient and patient started ambulating better and anticipate discharge home today.        Total time spent in examination counseling coordinating  care for this patient was greater than 35 minutes.

## 2025-04-22 NOTE — NURSING NOTE
The Pt is confused, tries to get off the bed all the time and very unsteady on her feet, she wants to go back home, this nurse took the Pt to the bathroom, now the Pt is in bed, watching TV, bed alarm is on, Pt's belongings are on the bedside table

## 2025-04-22 NOTE — PROGRESS NOTES
Ann Marie Murphy is a 89 y.o. female on day 3 of admission presenting with Confusion.    Subjective   Patient seen and examined, she is coming along fine, no new complaints, awake and alert answers appropriately, does have some memory impairment.  She is afebrile, no chest pain, she is wobbly on her feet, MRI of the brain was negative for acute stroke.  Patient and family wanted to go home therefore arrangements are being made for her to be discharged home later today.       Objective       Physical Exam  GENERAL:  Alert, no distress, cooperative  SKIN:  Skin color, texture, turgor normal. No rashes or lesions.  OROPHARYNX:  Lips, mucosa, and tongue are normal.Teeth and gums, normal. Oropharynx normal.  NECK:  No jugulovenous distention, No carotid bruits, Carotid pulse normal contour, Supple  LUNGS:  Lungs clear to auscultation. Good diaphragmatic excursion.  CARDIAC: Rate and rhythm is regular, normal S1 and S2; no rubs,  or gallops, 2/6 systolic ejection murmur left sternal border, no edema no CHF  ABDOMEN:  Abdomen soft, non-tender, BS normal, No masses or organomegaly  EXTREMETIES:  Extremities normal, no deformities, edema, clubbing or skin discoloration. Good capillary refill., No ulcers  NEURO:  Alert, oriented X 3, Gait  unsteady. Non-focal. Reflexes normal and symmetric.  Cranial nerves II-XII intact  PULSES:  2+ radial, 2+ carotid  Last Recorded Vitals  Blood pressure (!) 135/92, pulse 106, temperature 36.1 °C (97 °F), temperature source Temporal, resp. rate 18, height 1.524 m (5'), weight 54.4 kg (120 lb), SpO2 100%.  Intake/Output last 3 Shifts:  I/O last 3 completed shifts:  In: 1200 (22 mL/kg) [P.O.:1200]  Out: - (0 mL/kg)   Weight: 54.4 kg     Relevant Results  Scheduled medications  Scheduled Medications[1]  Continuous medications  Continuous Medications[2]  PRN medications  PRN Medications[3]       Results for orders placed or performed during the hospital encounter of 04/19/25 (from the past 96  hours)   POCT GLUCOSE   Result Value Ref Range    POCT Glucose 150 (H) 74 - 99 mg/dL   CBC and Auto Differential   Result Value Ref Range    WBC 8.6 4.4 - 11.3 x10*3/uL    nRBC 0.0 0.0 - 0.0 /100 WBCs    RBC 4.36 4.00 - 5.20 x10*6/uL    Hemoglobin 13.6 12.0 - 16.0 g/dL    Hematocrit 41.5 36.0 - 46.0 %    MCV 95 80 - 100 fL    MCH 31.2 26.0 - 34.0 pg    MCHC 32.8 32.0 - 36.0 g/dL    RDW 12.9 11.5 - 14.5 %    Platelets 227 150 - 450 x10*3/uL    Neutrophils % 71.0 40.0 - 80.0 %    Immature Granulocytes %, Automated 0.3 0.0 - 0.9 %    Lymphocytes % 19.3 13.0 - 44.0 %    Monocytes % 8.2 2.0 - 10.0 %    Eosinophils % 0.7 0.0 - 6.0 %    Basophils % 0.5 0.0 - 2.0 %    Neutrophils Absolute 6.12 (H) 1.60 - 5.50 x10*3/uL    Immature Granulocytes Absolute, Automated 0.03 0.00 - 0.50 x10*3/uL    Lymphocytes Absolute 1.66 0.80 - 3.00 x10*3/uL    Monocytes Absolute 0.71 0.05 - 0.80 x10*3/uL    Eosinophils Absolute 0.06 0.00 - 0.40 x10*3/uL    Basophils Absolute 0.04 0.00 - 0.10 x10*3/uL   Comprehensive Metabolic Panel   Result Value Ref Range    Glucose 128 (H) 74 - 99 mg/dL    Sodium 143 136 - 145 mmol/L    Potassium 3.7 3.5 - 5.3 mmol/L    Chloride 109 (H) 98 - 107 mmol/L    Bicarbonate 24 21 - 32 mmol/L    Anion Gap 14 10 - 20 mmol/L    Urea Nitrogen 12 6 - 23 mg/dL    Creatinine 0.87 0.50 - 1.05 mg/dL    eGFR 64 >60 mL/min/1.73m*2    Calcium 10.4 (H) 8.6 - 10.3 mg/dL    Albumin 3.9 3.4 - 5.0 g/dL    Alkaline Phosphatase 97 33 - 136 U/L    Total Protein 6.3 (L) 6.4 - 8.2 g/dL    AST 59 (H) 9 - 39 U/L    Bilirubin, Total 1.1 0.0 - 1.2 mg/dL    ALT 36 7 - 45 U/L   Lactate   Result Value Ref Range    Lactate 2.1 (H) 0.4 - 2.0 mmol/L   Blood Culture    Specimen: Peripheral Venipuncture; Blood culture   Result Value Ref Range    Blood Culture No growth at 3 days    Blood Culture    Specimen: Peripheral Venipuncture; Blood culture   Result Value Ref Range    Blood Culture No growth at 3 days    Sars-CoV-2 and Influenza A/B PCR    Result Value Ref Range    Flu A Result Not Detected Not Detected    Flu B Result Not Detected Not Detected    Coronavirus 2019, PCR Not Detected Not Detected   Urinalysis with Reflex Culture and Microscopic   Result Value Ref Range    Color, Urine Yellow Light-Yellow, Yellow, Dark-Yellow    Appearance, Urine Clear Clear    Specific Gravity, Urine 1.026 1.005 - 1.035    pH, Urine 5.5 5.0, 5.5, 6.0, 6.5, 7.0, 7.5, 8.0    Protein, Urine NEGATIVE NEGATIVE, 10 (TRACE), 20 (TRACE) mg/dL    Glucose, Urine Normal Normal mg/dL    Blood, Urine NEGATIVE NEGATIVE mg/dL    Ketones, Urine TRACE (A) NEGATIVE mg/dL    Bilirubin, Urine NEGATIVE NEGATIVE mg/dL    Urobilinogen, Urine 3 (1+) (A) Normal mg/dL    Nitrite, Urine NEGATIVE NEGATIVE    Leukocyte Esterase, Urine NEGATIVE NEGATIVE   CBC   Result Value Ref Range    WBC 6.7 4.4 - 11.3 x10*3/uL    nRBC 0.0 0.0 - 0.0 /100 WBCs    RBC 4.28 4.00 - 5.20 x10*6/uL    Hemoglobin 13.8 12.0 - 16.0 g/dL    Hematocrit 41.1 36.0 - 46.0 %    MCV 96 80 - 100 fL    MCH 32.2 26.0 - 34.0 pg    MCHC 33.6 32.0 - 36.0 g/dL    RDW 13.2 11.5 - 14.5 %    Platelets 210 150 - 450 x10*3/uL   Basic metabolic panel   Result Value Ref Range    Glucose 140 (H) 74 - 99 mg/dL    Sodium 146 (H) 136 - 145 mmol/L    Potassium 3.4 (L) 3.5 - 5.3 mmol/L    Chloride 114 (H) 98 - 107 mmol/L    Bicarbonate 25 21 - 32 mmol/L    Anion Gap 10 10 - 20 mmol/L    Urea Nitrogen 10 6 - 23 mg/dL    Creatinine 0.67 0.50 - 1.05 mg/dL    eGFR 84 >60 mL/min/1.73m*2    Calcium 10.1 8.6 - 10.3 mg/dL   POCT GLUCOSE   Result Value Ref Range    POCT Glucose 161 (H) 74 - 99 mg/dL   Levetiracetam   Result Value Ref Range    Keppra 10 10 - 40 ug/mL   Lavender Top   Result Value Ref Range    Extra Tube Hold for add-ons.    Vitamin B12   Result Value Ref Range    Vitamin B12 1,545 (H) 211 - 911 pg/mL   Folate   Result Value Ref Range    Folate, Serum 23.1 >5.0 ng/mL   TSH with reflex to Free T4 if abnormal   Result Value Ref Range     Thyroid Stimulating Hormone 0.45 0.44 - 3.98 mIU/L   CBC   Result Value Ref Range    WBC 9.5 4.4 - 11.3 x10*3/uL    nRBC 0.0 0.0 - 0.0 /100 WBCs    RBC 4.20 4.00 - 5.20 x10*6/uL    Hemoglobin 13.0 12.0 - 16.0 g/dL    Hematocrit 41.1 36.0 - 46.0 %    MCV 98 80 - 100 fL    MCH 31.0 26.0 - 34.0 pg    MCHC 31.6 (L) 32.0 - 36.0 g/dL    RDW 13.6 11.5 - 14.5 %    Platelets 203 150 - 450 x10*3/uL   Basic metabolic panel   Result Value Ref Range    Glucose 101 (H) 74 - 99 mg/dL    Sodium 145 136 - 145 mmol/L    Potassium 4.0 3.5 - 5.3 mmol/L    Chloride 113 (H) 98 - 107 mmol/L    Bicarbonate 26 21 - 32 mmol/L    Anion Gap 10 10 - 20 mmol/L    Urea Nitrogen 22 6 - 23 mg/dL    Creatinine 0.74 0.50 - 1.05 mg/dL    eGFR 78 >60 mL/min/1.73m*2    Calcium 10.1 8.6 - 10.3 mg/dL    MR brain wo IV contrast  Result Date: 4/20/2025  Interpreted By:  Zach Galvan, STUDY: MR BRAIN WO IV CONTRAST;  4/20/2025 4:25 pm   INDICATION: Signs/Symptoms:Left hemiataxia and encephalopathy.     COMPARISON: April 19, 2025   ACCESSION NUMBER(S): [id number]   ORDERING CLINICIAN: JACQUIE GARLAND   TECHNIQUE: Multiplaner, multisequence MRI were obtained without contrast   FINDINGS: Parenchyma:  There is a background of age-related volume loss and presumed ischemic white matter demyelination.   Ventricles: There is no hydrocephalus.   Extra-axial spaces: No abnormal extra-axial fluid collection. Basal cisterns are patent.   Vessels: T2 flow voids are maintained.   Orbits: The imaged orbits are unremarkable.   Paranasal sinuses and mastoid air cells: No fluid levels are present.   Skull: There is normal T1 marrow signal without evidence of aggressive lesion.   Soft tissues: Unremarkable           1. Negative brain MRI examination for acute change. 2. There is a background of age-related volume loss, atherosclerotic disease, and ischemic white matter demyelination.   MACRO: None   Signed by: Zach Galvan 4/20/2025 4:54 PM Dictation workstation:    [id number]    CT abdomen pelvis w IV contrast  Result Date: 4/19/2025  Interpreted By:  Dieter Levine, STUDY: CT ABDOMEN PELVIS W IV CONTRAST;  4/19/2025 12:46 pm   INDICATION: Signs/Symptoms:Tenderness over suprapubic region and right lower quadrant.   COMPARISON: 02/13/2023   ACCESSION NUMBER(S): [id number]   ORDERING CLINICIAN: JJ ROWLEY   TECHNIQUE: CT of the abdomen and pelvis was performed. Contiguous axial images were obtained at 3 mm slice thickness through the abdomen and pelvis. Coronal and sagittal reconstructions at 3 mm slice thickness were performed.  75 ML of Omnipaque 350 was administered intravenously without immediate complication.   FINDINGS: LOWER CHEST: Moderate emphysema and fibrosis similar to the previous exam. The ascending aorta is mildly dilated measuring up to 4 cm in diameter, without significant atherosclerotic calcification.   ABDOMEN:   LIVER: A slightly lobulated 1.6 cm hypodensity is present at the right liver lobe, corresponding to hypodensity on the prior examination and with what appear to be several foci of nodular enhancement most consistent with a hemangioma. The hepatic parenchyma otherwise is homogeneous.The hepatic size is normal.   SPLEEN: The spleen is normal in size and homogeneous.   ADRENAL GLANDS: Bilateral adrenal glands appear normal.   KIDNEYS AND URETERS: There are several small cortical cysts.  Additionally there are several hypodensities too small to definitively characterize, but statistically benign such as cysts. The renal cortices otherwise are maintained..   The ureters throughout their distal course are not well identified due to a paucity of intra-abdominal fat and overlying crowded bowel loops, but the tracts otherwise are unremarkable without identified ureteral dilatation or radiodense calculi.   PANCREAS: The pancreas appears unremarkable, there is no ductal dilatation or masses.   GALLBLADDER: Cholelithiasis is noted, but without wall thickening  or pericholecystic fluid to indicate cholecystitis.   BILE DUCTS: There is no biliary dilatation or filling defects.     VESSELS: Moderate atherosclerotic calcification of the aorta and iliac vessels, and proximal segment of the left renal artery. This appears fairly similar to the prior exam.   PERITONEUM AND RETROPERITONEUM: No ascites or free air, no fluid collection.   There are several surgical clips at the left upper abdomen posteriorly similar to the previous exam. No significant retroperitoneal adenopathy.   No enlarged mesenteric lymph nodes.   BOWEL: Mild diverticulosis of the sigmoid and descending colon, fairly similar to the prior exam. The bowel otherwise is unremarkable without significant dilatation or mural thickening.   PELVIS:   BLADDER: The urinary bladder contour is smooth.   REPRODUCTIVE ORGANS: Status post hysterectomy.     BONE, ABDOMINAL WALL AND OTHER FINDINGS: No suspicious osseous lesions are identified.   Fat containing small left non incarcerated inguinal hernia is noted.This appears similar to the previous exam.       1.  Emphysema and fibrosis at the lung bases. Mild aneurysmal dilatation of the ascending aorta measuring 4 cm. 2. Cholelithiasis. 3. Diverticulosis. 4. Hepatic hemangioma. Nonincarcerated small left inguinal hernia. Otherwise no acute findings.   MACRO: 1. None   Signed by: Dieter Levine 4/19/2025 1:17 PM Dictation workstation:   [id number]    CT head wo IV contrast  Result Date: 4/19/2025  Interpreted By:  Dieter Levine, STUDY: CT HEAD WO IV CONTRAST;  4/19/2025 12:46 pm   INDICATION: Signs/Symptoms:Worsening confusion x 2 weeks, unsteady on feet.   COMPARISON: 10/24/2021   ACCESSION NUMBER(S): [id number]   ORDERING CLINICIAN: JJ ROWLEY   TECHNIQUE: Sequential trans axial images were obtained  .   FINDINGS: INTRACRANIAL:   There is moderate prominence of the cortical sulci indicating atrophy.   There is moderate ventriculomegaly, again consistent with atrophy.    Moderate decreased attenuation of the periventricular and long tracks of the white matter most consistent with gliosis from arterial disease. There is no evidence of definite subacute infarction, intracranial hemorrhage or mass.     EXTRACRANIAL: Moderate mucosal thickening of the right maxillary air cell with 1.4 cm nodularity indicating a polyp or mucous retention cyst. The calvarium is intact.       Moderate age-related degenerative change appears progressed from previous examination, but without acute findings.   Signed by: Dieter Levine 4/19/2025 1:07 PM Dictation workstation:   [id number]    XR lumbar spine 2-3 views  Result Date: 4/11/2025  Interpreted By:  Jareth Hess, STUDY: XR LUMBAR SPINE 2-3 VIEWS   INDICATION: Signs/Symptoms:lbp.   COMPARISON: None   ACCESSION NUMBER(S): [id number]   ORDERING CLINICIAN: JACQUIE CASEY   FINDINGS: Fairly advanced lumbar degenerative changes mostly L3-S1 with a mild scoliosis. No evidence of fracture or lesion.       Advanced lower lumbar degenerative changes with mild scoliosis.   Signed by: Jareth Hess 4/11/2025 7:01 AM Dictation workstation:   [id number]                    Assessment & Plan  Confusion  89-year-old lady with history of hypertension, hyperlipidemia, previous breast carcinoma in remission on anastrozole, diverticulosis of colon and asymptomatic cholelithiasis seen on CT scan of abdomen, history of seizure disorder and chronic lower back pain for which she has been taking tramadol presents with confusion and wobbly gait for the last 5 to 7 days and forgetfulness.  On examination she was noted to be intermittent confusion and left hemiataxia.    1.  Acute intermittent confusion could be medication related including tramadol which she has been taking 300 mg daily for many years for chronic back pain  However patient also has early dementia  Her confusion has resolved now ,she is very appropriate and  she is back to baseline.    2.  Left hemiataxia and  wobbly gait in a patient who has a history of seizure disorder,  CT of the brain was not suggestive of any acute stroke.  Patient has been started on aspirin and will continue with atorvastatin  Seen by neurology other day and MRI of the brain was ordered which was completed which revealed no evidence of acute stroke.    3.  Hypertension, blood pressures are elevated, will resume metoprolol and patient was also on valsartan which could be resumed if blood pressure persistently remain elevated.    4.  History of breast carcinoma in remission resumed anastrozole    5.  Chronic back pain, CT of the lumbar spine revealed no fractures, does have significant arthritis and degenerative disc disease, continue Tylenol for pain as needed and hold tramadol for the time being.    6.  Seizure Disorder, Continue with Keppra.     PT OT.  Evaluated patient today.    Overall significant improvement in her condition since admission and MRI is negative for acute stroke and no bony injuries are noted.  Physical therapy is working on patient and patient started ambulating better and anticipate discharge home today.        Total time spent in examination counseling coordinating care for this patient was greater than 35 minutes.        James Galindo MD         [1] anastrozole, 1 mg, oral, Daily  aspirin, 81 mg, oral, Daily  atorvastatin, 10 mg, oral, Nightly  cholecalciferol, 50 mcg, oral, Daily  enoxaparin, 40 mg, subcutaneous, q24h  levETIRAcetam XR, 500 mg, oral, BID  metoprolol succinate XL, 100 mg, oral, Daily  mirtazapine, 7.5 mg, oral, Nightly  pantoprazole, 40 mg, oral, Daily before breakfast  polyethylene glycol, 17 g, oral, Daily  [2]    [3] PRN medications: acetaminophen, OLANZapine, ondansetron **OR** ondansetron

## 2025-04-23 LAB
ANION GAP SERPL CALC-SCNC: 10 MMOL/L (ref 10–20)
BACTERIA BLD CULT: NORMAL
BACTERIA BLD CULT: NORMAL
BUN SERPL-MCNC: 21 MG/DL (ref 6–23)
CALCIUM SERPL-MCNC: 9.9 MG/DL (ref 8.6–10.3)
CHLORIDE SERPL-SCNC: 114 MMOL/L (ref 98–107)
CO2 SERPL-SCNC: 25 MMOL/L (ref 21–32)
CREAT SERPL-MCNC: 0.74 MG/DL (ref 0.5–1.05)
EGFRCR SERPLBLD CKD-EPI 2021: 78 ML/MIN/1.73M*2
ERYTHROCYTE [DISTWIDTH] IN BLOOD BY AUTOMATED COUNT: 13.7 % (ref 11.5–14.5)
GLUCOSE SERPL-MCNC: 97 MG/DL (ref 74–99)
HCT VFR BLD AUTO: 43 % (ref 36–46)
HGB BLD-MCNC: 13.7 G/DL (ref 12–16)
MCH RBC QN AUTO: 32.5 PG (ref 26–34)
MCHC RBC AUTO-ENTMCNC: 31.9 G/DL (ref 32–36)
MCV RBC AUTO: 102 FL (ref 80–100)
NRBC BLD-RTO: 0 /100 WBCS (ref 0–0)
PLATELET # BLD AUTO: 179 X10*3/UL (ref 150–450)
POTASSIUM SERPL-SCNC: 4.5 MMOL/L (ref 3.5–5.3)
RBC # BLD AUTO: 4.22 X10*6/UL (ref 4–5.2)
SODIUM SERPL-SCNC: 144 MMOL/L (ref 136–145)
WBC # BLD AUTO: 8.2 X10*3/UL (ref 4.4–11.3)

## 2025-04-23 PROCEDURE — 36415 COLL VENOUS BLD VENIPUNCTURE: CPT

## 2025-04-23 PROCEDURE — 99232 SBSQ HOSP IP/OBS MODERATE 35: CPT | Performed by: INTERNAL MEDICINE

## 2025-04-23 PROCEDURE — 2500000002 HC RX 250 W HCPCS SELF ADMINISTERED DRUGS (ALT 637 FOR MEDICARE OP, ALT 636 FOR OP/ED)

## 2025-04-23 PROCEDURE — 1100000001 HC PRIVATE ROOM DAILY

## 2025-04-23 PROCEDURE — 85027 COMPLETE CBC AUTOMATED: CPT

## 2025-04-23 PROCEDURE — 2500000004 HC RX 250 GENERAL PHARMACY W/ HCPCS (ALT 636 FOR OP/ED): Performed by: INTERNAL MEDICINE

## 2025-04-23 PROCEDURE — 80048 BASIC METABOLIC PNL TOTAL CA: CPT

## 2025-04-23 PROCEDURE — 2500000001 HC RX 250 WO HCPCS SELF ADMINISTERED DRUGS (ALT 637 FOR MEDICARE OP)

## 2025-04-23 PROCEDURE — 2500000001 HC RX 250 WO HCPCS SELF ADMINISTERED DRUGS (ALT 637 FOR MEDICARE OP): Performed by: INTERNAL MEDICINE

## 2025-04-23 RX ORDER — QUETIAPINE FUMARATE 25 MG/1
25 TABLET, FILM COATED ORAL 2 TIMES DAILY
Status: DISCONTINUED | OUTPATIENT
Start: 2025-04-23 | End: 2025-04-24 | Stop reason: HOSPADM

## 2025-04-23 RX ORDER — OLANZAPINE 2.5 MG/1
2.5 TABLET, FILM COATED ORAL DAILY
Status: DISCONTINUED | OUTPATIENT
Start: 2025-04-23 | End: 2025-04-23

## 2025-04-23 RX ADMIN — ATORVASTATIN CALCIUM 10 MG: 10 TABLET, FILM COATED ORAL at 21:28

## 2025-04-23 RX ADMIN — QUETIAPINE FUMARATE 25 MG: 25 TABLET ORAL at 21:28

## 2025-04-23 RX ADMIN — ASPIRIN 81 MG: 81 TABLET, COATED ORAL at 09:33

## 2025-04-23 RX ADMIN — LEVETIRACETAM 500 MG: 500 TABLET, FILM COATED, EXTENDED RELEASE ORAL at 09:32

## 2025-04-23 RX ADMIN — LEVETIRACETAM 500 MG: 500 TABLET, FILM COATED, EXTENDED RELEASE ORAL at 21:28

## 2025-04-23 RX ADMIN — METOPROLOL SUCCINATE 100 MG: 50 TABLET, EXTENDED RELEASE ORAL at 09:32

## 2025-04-23 RX ADMIN — Medication 50 MCG: at 09:33

## 2025-04-23 RX ADMIN — MIRTAZAPINE 7.5 MG: 15 TABLET, FILM COATED ORAL at 21:28

## 2025-04-23 RX ADMIN — ANASTROZOLE 1 MG: 1 TABLET, COATED ORAL at 09:33

## 2025-04-23 RX ADMIN — OLANZAPINE 2.5 MG: 2.5 TABLET, FILM COATED ORAL at 09:33

## 2025-04-23 RX ADMIN — ENOXAPARIN SODIUM 40 MG: 40 INJECTION SUBCUTANEOUS at 16:04

## 2025-04-23 ASSESSMENT — COGNITIVE AND FUNCTIONAL STATUS - GENERAL
STANDING UP FROM CHAIR USING ARMS: A LITTLE
MOVING TO AND FROM BED TO CHAIR: A LOT
HELP NEEDED FOR BATHING: A LITTLE
MOBILITY SCORE: 16
DAILY ACTIVITIY SCORE: 20
DRESSING REGULAR UPPER BODY CLOTHING: A LITTLE
CLIMB 3 TO 5 STEPS WITH RAILING: A LOT
DRESSING REGULAR LOWER BODY CLOTHING: A LITTLE
TOILETING: A LITTLE
WALKING IN HOSPITAL ROOM: A LOT
DAILY ACTIVITIY SCORE: 20
WALKING IN HOSPITAL ROOM: A LOT
HELP NEEDED FOR BATHING: A LITTLE
TURNING FROM BACK TO SIDE WHILE IN FLAT BAD: A LITTLE
MOVING TO AND FROM BED TO CHAIR: A LOT
TOILETING: A LITTLE
CLIMB 3 TO 5 STEPS WITH RAILING: A LOT
DRESSING REGULAR LOWER BODY CLOTHING: A LITTLE
TURNING FROM BACK TO SIDE WHILE IN FLAT BAD: A LITTLE
STANDING UP FROM CHAIR USING ARMS: A LITTLE
DRESSING REGULAR UPPER BODY CLOTHING: A LITTLE
MOBILITY SCORE: 16

## 2025-04-23 ASSESSMENT — PAIN SCALES - GENERAL: PAINLEVEL_OUTOF10: 0 - NO PAIN

## 2025-04-23 NOTE — ASSESSMENT & PLAN NOTE
89-year-old lady with history of hypertension, hyperlipidemia, previous breast carcinoma in remission on anastrozole, diverticulosis of colon and asymptomatic cholelithiasis seen on CT scan of abdomen, history of seizure disorder and chronic lower back pain for which she has been taking tramadol presents with confusion and wobbly gait for the last 5 to 7 days and forgetfulness.  On examination she was noted to be intermittent confusion and left hemiataxia.    1.  Acute intermittent confusion could be medication related including tramadol which she has been taking 300 mg daily for many years for chronic back pain  However patient also has early dementia  She was quite agitated overnight and required Zyprexa currently she is fully awake alert.  Add Seroquel 25 mg twice daily and seek psychiatric consultation.    2.  Left hemiataxia and wobbly gait in a patient who has a history of seizure disorder,  CT of the brain was not suggestive of any acute stroke.  Patient has been started on aspirin and will continue with atorvastatin  Seen by neurology other day and MRI of the brain was ordered which was completed which revealed no evidence of acute stroke.    3.  Hypertension, blood pressures are elevated, will resume metoprolol and patient was also on valsartan which could be resumed if blood pressure persistently remain elevated.    4.  History of breast carcinoma in remission resumed anastrozole    5.  Chronic back pain, CT of the lumbar spine revealed no fractures, does have significant arthritis and degenerative disc disease, continue Tylenol for pain as needed and hold tramadol for the time being.    6.  Seizure Disorder, Continue with Keppra.    7.  History of chronic atrial fibrillation status post Watchman device.     PT OT.  Evaluated patient today.    Overall significant improvement in her condition since admission and MRI is negative for acute stroke and no bony injuries are noted.    However patient is agitated  at nighttime and required Zyprexa, she has intermittent confusion, added Seroquel 25 mg twice daily, will seek psych consultation and she will need to go to skilled nursing facility.    Patient's daughter was in the room who is updated with patient's condition.    Total time spent in examination counseling coordinating care for this patient was greater than 35 minutes.

## 2025-04-23 NOTE — PROGRESS NOTES
Spoke with daughter regarding facilities that are able to accept. Her [facility] is [facility]. [facility] was notified that they are [facility].

## 2025-04-23 NOTE — PROGRESS NOTES
Ann Marie Murphy is a 89 y.o. female on day 4 of admission presenting with Confusion.    Subjective   Patient seen and examined, patient's daughter Mary is in the room.  89-year-old lady was admitted with altered mentation confusion and delirium possibly due to new onset dementia with behavior changes.  She was doing fairly well yesterday during the daytime however the middle of the night she was quite agitated and required Zyprexa.  Patient stated that she has had chronic joint pain and has been taking tramadol at home for last 15 to 20 years.  This morning she is much better and calm although feels weak and has pain all over.  She is afebrile and vital signs are stable.         Objective     Physical Exam  GENERAL:  Alert, no distress, cooperative, afebrile  SKIN:  Skin color, texture, turgor normal. No rashes or lesions.  OROPHARYNX:  Lips, mucosa, and tongue are normal.Teeth and gums, normal. Oropharynx normal.  NECK:  No jugulovenous distention, No carotid bruits, Carotid pulse normal contour, Supple  LUNGS:  Lungs clear to auscultation. Good diaphragmatic excursion.  CARDIAC: Currently in atrial fibrillation with controlled rate normal S1 and S2; no rubs,  or gallops, 2/6 systolic ejection murmur left sternal border, no CHF  ABDOMEN:  Abdomen soft, non-tender, BS normal, No masses or organomegaly  EXTREMETIES:  Extremities normal, no deformities, edema, clubbing or skin discoloration. Good capillary refill., No ulcers  NEURO:  Alert, oriented X 3,  Non-focal.  PULSES:  2+ radial, 2+ carotid    Last Recorded Vitals  Blood pressure (!) 153/92, pulse 81, temperature 36.6 °C (97.9 °F), temperature source Temporal, resp. rate 18, height 1.524 m (5'), weight 54.4 kg (120 lb), SpO2 99%.  Intake/Output last 3 Shifts:  No intake/output data recorded.    Relevant Results  Scheduled medications  Scheduled Medications[1]  Continuous medications  Continuous Medications[2]  PRN medications  PRN Medications[3]        Results for orders placed or performed during the hospital encounter of 04/19/25 (from the past 96 hours)   CBC   Result Value Ref Range    WBC 6.7 4.4 - 11.3 x10*3/uL    nRBC 0.0 0.0 - 0.0 /100 WBCs    RBC 4.28 4.00 - 5.20 x10*6/uL    Hemoglobin 13.8 12.0 - 16.0 g/dL    Hematocrit 41.1 36.0 - 46.0 %    MCV 96 80 - 100 fL    MCH 32.2 26.0 - 34.0 pg    MCHC 33.6 32.0 - 36.0 g/dL    RDW 13.2 11.5 - 14.5 %    Platelets 210 150 - 450 x10*3/uL   Basic metabolic panel   Result Value Ref Range    Glucose 140 (H) 74 - 99 mg/dL    Sodium 146 (H) 136 - 145 mmol/L    Potassium 3.4 (L) 3.5 - 5.3 mmol/L    Chloride 114 (H) 98 - 107 mmol/L    Bicarbonate 25 21 - 32 mmol/L    Anion Gap 10 10 - 20 mmol/L    Urea Nitrogen 10 6 - 23 mg/dL    Creatinine 0.67 0.50 - 1.05 mg/dL    eGFR 84 >60 mL/min/1.73m*2    Calcium 10.1 8.6 - 10.3 mg/dL   POCT GLUCOSE   Result Value Ref Range    POCT Glucose 161 (H) 74 - 99 mg/dL   Levetiracetam   Result Value Ref Range    Keppra 10 10 - 40 ug/mL   Lavender Top   Result Value Ref Range    Extra Tube Hold for add-ons.    Vitamin B12   Result Value Ref Range    Vitamin B12 1,545 (H) 211 - 911 pg/mL   Folate   Result Value Ref Range    Folate, Serum 23.1 >5.0 ng/mL   TSH with reflex to Free T4 if abnormal   Result Value Ref Range    Thyroid Stimulating Hormone 0.45 0.44 - 3.98 mIU/L   CBC   Result Value Ref Range    WBC 9.5 4.4 - 11.3 x10*3/uL    nRBC 0.0 0.0 - 0.0 /100 WBCs    RBC 4.20 4.00 - 5.20 x10*6/uL    Hemoglobin 13.0 12.0 - 16.0 g/dL    Hematocrit 41.1 36.0 - 46.0 %    MCV 98 80 - 100 fL    MCH 31.0 26.0 - 34.0 pg    MCHC 31.6 (L) 32.0 - 36.0 g/dL    RDW 13.6 11.5 - 14.5 %    Platelets 203 150 - 450 x10*3/uL   Basic metabolic panel   Result Value Ref Range    Glucose 101 (H) 74 - 99 mg/dL    Sodium 145 136 - 145 mmol/L    Potassium 4.0 3.5 - 5.3 mmol/L    Chloride 113 (H) 98 - 107 mmol/L    Bicarbonate 26 21 - 32 mmol/L    Anion Gap 10 10 - 20 mmol/L    Urea Nitrogen 22 6 - 23 mg/dL     Creatinine 0.74 0.50 - 1.05 mg/dL    eGFR 78 >60 mL/min/1.73m*2    Calcium 10.1 8.6 - 10.3 mg/dL   CBC   Result Value Ref Range    WBC 8.2 4.4 - 11.3 x10*3/uL    nRBC 0.0 0.0 - 0.0 /100 WBCs    RBC 4.22 4.00 - 5.20 x10*6/uL    Hemoglobin 13.7 12.0 - 16.0 g/dL    Hematocrit 43.0 36.0 - 46.0 %     (H) 80 - 100 fL    MCH 32.5 26.0 - 34.0 pg    MCHC 31.9 (L) 32.0 - 36.0 g/dL    RDW 13.7 11.5 - 14.5 %    Platelets 179 150 - 450 x10*3/uL   Basic metabolic panel   Result Value Ref Range    Glucose 97 74 - 99 mg/dL    Sodium 144 136 - 145 mmol/L    Potassium 4.5 3.5 - 5.3 mmol/L    Chloride 114 (H) 98 - 107 mmol/L    Bicarbonate 25 21 - 32 mmol/L    Anion Gap 10 10 - 20 mmol/L    Urea Nitrogen 21 6 - 23 mg/dL    Creatinine 0.74 0.50 - 1.05 mg/dL    eGFR 78 >60 mL/min/1.73m*2    Calcium 9.9 8.6 - 10.3 mg/dL    MR brain wo IV contrast  Result Date: 4/20/2025  Interpreted By:  Zach Galvan, STUDY: MR BRAIN WO IV CONTRAST;  4/20/2025 4:25 pm   INDICATION: Signs/Symptoms:Left hemiataxia and encephalopathy.     COMPARISON: April 19, 2025   ACCESSION NUMBER(S): [id number]   ORDERING CLINICIAN: JACQUIE GARLAND   TECHNIQUE: Multiplaner, multisequence MRI were obtained without contrast   FINDINGS: Parenchyma:  There is a background of age-related volume loss and presumed ischemic white matter demyelination.   Ventricles: There is no hydrocephalus.   Extra-axial spaces: No abnormal extra-axial fluid collection. Basal cisterns are patent.   Vessels: T2 flow voids are maintained.   Orbits: The imaged orbits are unremarkable.   Paranasal sinuses and mastoid air cells: No fluid levels are present.   Skull: There is normal T1 marrow signal without evidence of aggressive lesion.   Soft tissues: Unremarkable           1. Negative brain MRI examination for acute change. 2. There is a background of age-related volume loss, atherosclerotic disease, and ischemic white matter demyelination.   MACRO: None   Signed by: Zach Galvan  4/20/2025 4:54 PM Dictation workstation:   [id number]    CT abdomen pelvis w IV contrast  Result Date: 4/19/2025  Interpreted By:  Dieter Levine, STUDY: CT ABDOMEN PELVIS W IV CONTRAST;  4/19/2025 12:46 pm   INDICATION: Signs/Symptoms:Tenderness over suprapubic region and right lower quadrant.   COMPARISON: 02/13/2023   ACCESSION NUMBER(S): [id number]   ORDERING CLINICIAN: JJ ROWLEY   TECHNIQUE: CT of the abdomen and pelvis was performed. Contiguous axial images were obtained at 3 mm slice thickness through the abdomen and pelvis. Coronal and sagittal reconstructions at 3 mm slice thickness were performed.  75 ML of Omnipaque 350 was administered intravenously without immediate complication.   FINDINGS: LOWER CHEST: Moderate emphysema and fibrosis similar to the previous exam. The ascending aorta is mildly dilated measuring up to 4 cm in diameter, without significant atherosclerotic calcification.   ABDOMEN:   LIVER: A slightly lobulated 1.6 cm hypodensity is present at the right liver lobe, corresponding to hypodensity on the prior examination and with what appear to be several foci of nodular enhancement most consistent with a hemangioma. The hepatic parenchyma otherwise is homogeneous.The hepatic size is normal.   SPLEEN: The spleen is normal in size and homogeneous.   ADRENAL GLANDS: Bilateral adrenal glands appear normal.   KIDNEYS AND URETERS: There are several small cortical cysts.  Additionally there are several hypodensities too small to definitively characterize, but statistically benign such as cysts. The renal cortices otherwise are maintained..   The ureters throughout their distal course are not well identified due to a paucity of intra-abdominal fat and overlying crowded bowel loops, but the tracts otherwise are unremarkable without identified ureteral dilatation or radiodense calculi.   PANCREAS: The pancreas appears unremarkable, there is no ductal dilatation or masses.   GALLBLADDER:  Cholelithiasis is noted, but without wall thickening or pericholecystic fluid to indicate cholecystitis.   BILE DUCTS: There is no biliary dilatation or filling defects.     VESSELS: Moderate atherosclerotic calcification of the aorta and iliac vessels, and proximal segment of the left renal artery. This appears fairly similar to the prior exam.   PERITONEUM AND RETROPERITONEUM: No ascites or free air, no fluid collection.   There are several surgical clips at the left upper abdomen posteriorly similar to the previous exam. No significant retroperitoneal adenopathy.   No enlarged mesenteric lymph nodes.   BOWEL: Mild diverticulosis of the sigmoid and descending colon, fairly similar to the prior exam. The bowel otherwise is unremarkable without significant dilatation or mural thickening.   PELVIS:   BLADDER: The urinary bladder contour is smooth.   REPRODUCTIVE ORGANS: Status post hysterectomy.     BONE, ABDOMINAL WALL AND OTHER FINDINGS: No suspicious osseous lesions are identified.   Fat containing small left non incarcerated inguinal hernia is noted.This appears similar to the previous exam.       1.  Emphysema and fibrosis at the lung bases. Mild aneurysmal dilatation of the ascending aorta measuring 4 cm. 2. Cholelithiasis. 3. Diverticulosis. 4. Hepatic hemangioma. Nonincarcerated small left inguinal hernia. Otherwise no acute findings.   MACRO: 1. None   Signed by: Dieter Levine 4/19/2025 1:17 PM Dictation workstation:   [id number]    CT head wo IV contrast  Result Date: 4/19/2025  Interpreted By:  Dieter Levine, STUDY: CT HEAD WO IV CONTRAST;  4/19/2025 12:46 pm   INDICATION: Signs/Symptoms:Worsening confusion x 2 weeks, unsteady on feet.   COMPARISON: 10/24/2021   ACCESSION NUMBER(S): [id number]   ORDERING CLINICIAN: JJ ROWLEY   TECHNIQUE: Sequential trans axial images were obtained  .   FINDINGS: INTRACRANIAL:   There is moderate prominence of the cortical sulci indicating atrophy.   There is moderate  ventriculomegaly, again consistent with atrophy.   Moderate decreased attenuation of the periventricular and long tracks of the white matter most consistent with gliosis from arterial disease. There is no evidence of definite subacute infarction, intracranial hemorrhage or mass.     EXTRACRANIAL: Moderate mucosal thickening of the right maxillary air cell with 1.4 cm nodularity indicating a polyp or mucous retention cyst. The calvarium is intact.       Moderate age-related degenerative change appears progressed from previous examination, but without acute findings.   Signed by: Dieter Levine 4/19/2025 1:07 PM Dictation workstation:   [id number]    XR lumbar spine 2-3 views  Result Date: 4/11/2025  Interpreted By:  Jareth Hess, STUDY: XR LUMBAR SPINE 2-3 VIEWS   INDICATION: Signs/Symptoms:lbp.   COMPARISON: None   ACCESSION NUMBER(S): [id number]   ORDERING CLINICIAN: JACQUIE CASEY   FINDINGS: Fairly advanced lumbar degenerative changes mostly L3-S1 with a mild scoliosis. No evidence of fracture or lesion.       Advanced lower lumbar degenerative changes with mild scoliosis.   Signed by: Jareth Hess 4/11/2025 7:01 AM Dictation workstation:   [id number]                  Assessment & Plan  Confusion  89-year-old lady with history of hypertension, hyperlipidemia, previous breast carcinoma in remission on anastrozole, diverticulosis of colon and asymptomatic cholelithiasis seen on CT scan of abdomen, history of seizure disorder and chronic lower back pain for which she has been taking tramadol presents with confusion and wobbly gait for the last 5 to 7 days and forgetfulness.  On examination she was noted to be intermittent confusion and left hemiataxia.    1.  Acute intermittent confusion could be medication related including tramadol which she has been taking 300 mg daily for many years for chronic back pain  However patient also has early dementia  She was quite agitated overnight and required Zyprexa currently  she is fully awake alert.  Add Seroquel 25 mg twice daily and seek psychiatric consultation.    2.  Left hemiataxia and wobbly gait in a patient who has a history of seizure disorder,  CT of the brain was not suggestive of any acute stroke.  Patient has been started on aspirin and will continue with atorvastatin  Seen by neurology other day and MRI of the brain was ordered which was completed which revealed no evidence of acute stroke.    3.  Hypertension, blood pressures are elevated, will resume metoprolol and patient was also on valsartan which could be resumed if blood pressure persistently remain elevated.    4.  History of breast carcinoma in remission resumed anastrozole    5.  Chronic back pain, CT of the lumbar spine revealed no fractures, does have significant arthritis and degenerative disc disease, continue Tylenol for pain as needed and hold tramadol for the time being.    6.  Seizure Disorder, Continue with Keppra.    7.  History of chronic atrial fibrillation status post Watchman device.     PT OT.  Evaluated patient today.    Overall significant improvement in her condition since admission and MRI is negative for acute stroke and no bony injuries are noted.    However patient is agitated at nighttime and required Zyprexa, she has intermittent confusion, added Seroquel 25 mg twice daily, will seek psych consultation and she will need to go to skilled nursing facility.    Patient's daughter was in the room who is updated with patient's condition.    Total time spent in examination counseling coordinating care for this patient was greater than 35 minutes.          James Galindo MD         [1] anastrozole, 1 mg, oral, Daily  aspirin, 81 mg, oral, Daily  atorvastatin, 10 mg, oral, Nightly  cholecalciferol, 50 mcg, oral, Daily  enoxaparin, 40 mg, subcutaneous, q24h  levETIRAcetam XR, 500 mg, oral, BID  metoprolol succinate XL, 100 mg, oral, Daily  mirtazapine, 7.5 mg, oral, Nightly  pantoprazole, 40  mg, oral, Daily before breakfast  polyethylene glycol, 17 g, oral, Daily  QUEtiapine, 25 mg, oral, BID  [2]    [3] PRN medications: acetaminophen, ondansetron **OR** ondansetron

## 2025-04-23 NOTE — NURSING NOTE
Pt had begun sundowning before start of shift change in presence of daughter Mary.     Despite pt getting Zyprexa IM, pt remains agitated. Pt sitter was still at bedside at this time. Pt began attempting to kick staff in between her many multiple attempts to get out of bed. Verbal redirection was becoming less and less successful.     Soft wrist restraints ordered and daughter Mary notified. Daughter verbalized understanding and the need for said restraints.     Pt sitter removed from bedside around 4/22/25 [id number].

## 2025-04-23 NOTE — PROGRESS NOTES
04/23/25 1036   Discharge Planning   Expected Discharge Disposition SNF     Plan on discharge would be to SNF, patient's daughter left choices for referrals to be sent, 1) [facility], 2) [facility] and [facility], 4) [facility], I have asked our DSC team to build accepting referrals for SNF review.    13:55 per notes in [facility] able to accept I have reached out to SW to see if family can confirm FOC.    14:31 [facility] is [facility], I will continue to monitor for dc planing, patient will not need auth has medicare.

## 2025-04-24 ENCOUNTER — HOSPITAL ENCOUNTER (INPATIENT)
Dept: CARDIOLOGY | Facility: HOSPITAL | Age: 89
Discharge: HOME | End: 2025-04-24
Payer: MEDICARE

## 2025-04-24 ENCOUNTER — APPOINTMENT (OUTPATIENT)
Dept: CARDIOLOGY | Facility: HOSPITAL | Age: 89
End: 2025-04-24
Payer: MEDICARE

## 2025-04-24 VITALS
HEIGHT: 60 IN | RESPIRATION RATE: 17 BRPM | OXYGEN SATURATION: 97 % | WEIGHT: 120 LBS | TEMPERATURE: 98.2 F | BODY MASS INDEX: 23.56 KG/M2 | DIASTOLIC BLOOD PRESSURE: 73 MMHG | SYSTOLIC BLOOD PRESSURE: 116 MMHG | HEART RATE: 97 BPM

## 2025-04-24 PROBLEM — R41.0 CONFUSION: Status: RESOLVED | Noted: 2025-04-19 | Resolved: 2025-04-24

## 2025-04-24 LAB
ANION GAP SERPL CALC-SCNC: 9 MMOL/L (ref 10–20)
BUN SERPL-MCNC: 28 MG/DL (ref 6–23)
CALCIUM SERPL-MCNC: 9.9 MG/DL (ref 8.6–10.3)
CHLORIDE SERPL-SCNC: 111 MMOL/L (ref 98–107)
CO2 SERPL-SCNC: 28 MMOL/L (ref 21–32)
CREAT SERPL-MCNC: 0.71 MG/DL (ref 0.5–1.05)
EGFRCR SERPLBLD CKD-EPI 2021: 82 ML/MIN/1.73M*2
ERYTHROCYTE [DISTWIDTH] IN BLOOD BY AUTOMATED COUNT: 13.5 % (ref 11.5–14.5)
GLUCOSE SERPL-MCNC: 113 MG/DL (ref 74–99)
HCT VFR BLD AUTO: 42 % (ref 36–46)
HGB BLD-MCNC: 13.3 G/DL (ref 12–16)
MCH RBC QN AUTO: 31.4 PG (ref 26–34)
MCHC RBC AUTO-ENTMCNC: 31.7 G/DL (ref 32–36)
MCV RBC AUTO: 99 FL (ref 80–100)
NRBC BLD-RTO: 0 /100 WBCS (ref 0–0)
PLATELET # BLD AUTO: 196 X10*3/UL (ref 150–450)
POTASSIUM SERPL-SCNC: 4 MMOL/L (ref 3.5–5.3)
RBC # BLD AUTO: 4.24 X10*6/UL (ref 4–5.2)
SODIUM SERPL-SCNC: 144 MMOL/L (ref 136–145)
WBC # BLD AUTO: 9.8 X10*3/UL (ref 4.4–11.3)

## 2025-04-24 PROCEDURE — 97116 GAIT TRAINING THERAPY: CPT | Mod: GP,CQ

## 2025-04-24 PROCEDURE — 2500000004 HC RX 250 GENERAL PHARMACY W/ HCPCS (ALT 636 FOR OP/ED): Performed by: INTERNAL MEDICINE

## 2025-04-24 PROCEDURE — 99497 ADVNCD CARE PLAN 30 MIN: CPT | Performed by: STUDENT IN AN ORGANIZED HEALTH CARE EDUCATION/TRAINING PROGRAM

## 2025-04-24 PROCEDURE — 2500000001 HC RX 250 WO HCPCS SELF ADMINISTERED DRUGS (ALT 637 FOR MEDICARE OP): Performed by: INTERNAL MEDICINE

## 2025-04-24 PROCEDURE — 2500000001 HC RX 250 WO HCPCS SELF ADMINISTERED DRUGS (ALT 637 FOR MEDICARE OP)

## 2025-04-24 PROCEDURE — 93005 ELECTROCARDIOGRAM TRACING: CPT

## 2025-04-24 PROCEDURE — 2500000002 HC RX 250 W HCPCS SELF ADMINISTERED DRUGS (ALT 637 FOR MEDICARE OP, ALT 636 FOR OP/ED)

## 2025-04-24 PROCEDURE — 97110 THERAPEUTIC EXERCISES: CPT | Mod: GP,CQ

## 2025-04-24 PROCEDURE — 80048 BASIC METABOLIC PNL TOTAL CA: CPT

## 2025-04-24 PROCEDURE — 85027 COMPLETE CBC AUTOMATED: CPT

## 2025-04-24 PROCEDURE — 36415 COLL VENOUS BLD VENIPUNCTURE: CPT

## 2025-04-24 PROCEDURE — 93010 ELECTROCARDIOGRAM REPORT: CPT | Performed by: INTERNAL MEDICINE

## 2025-04-24 PROCEDURE — 99223 1ST HOSP IP/OBS HIGH 75: CPT | Performed by: STUDENT IN AN ORGANIZED HEALTH CARE EDUCATION/TRAINING PROGRAM

## 2025-04-24 PROCEDURE — 99239 HOSP IP/OBS DSCHRG MGMT >30: CPT | Performed by: INTERNAL MEDICINE

## 2025-04-24 PROCEDURE — G0316 PR PROLONGED HOSPITAL INPATIENT OR OBSERVATION CARE EVALUATION AND MANAGEMENT SERVICE(S) BEYOND THE TOTAL TIME FOR THE PRIMARY SERVICE (WHEN THE PRIMARY SERVICE HAS BEEN SELECTED USING TIME: HCPCS | Performed by: STUDENT IN AN ORGANIZED HEALTH CARE EDUCATION/TRAINING PROGRAM

## 2025-04-24 RX ORDER — SERTRALINE HYDROCHLORIDE 25 MG/1
25 TABLET, FILM COATED ORAL DAILY
Qty: 60 TABLET | Refills: 0 | Status: SHIPPED | OUTPATIENT
Start: 2025-04-24 | End: 2025-06-23

## 2025-04-24 RX ORDER — QUETIAPINE FUMARATE 25 MG/1
12.5 TABLET, FILM COATED ORAL 2 TIMES DAILY
Qty: 60 TABLET | Refills: 0 | Status: SHIPPED | OUTPATIENT
Start: 2025-04-24

## 2025-04-24 RX ADMIN — QUETIAPINE FUMARATE 25 MG: 25 TABLET ORAL at 09:13

## 2025-04-24 RX ADMIN — ANASTROZOLE 1 MG: 1 TABLET, COATED ORAL at 09:13

## 2025-04-24 RX ADMIN — Medication 50 MCG: at 09:13

## 2025-04-24 RX ADMIN — ASPIRIN 81 MG: 81 TABLET, COATED ORAL at 09:13

## 2025-04-24 RX ADMIN — METOPROLOL SUCCINATE 100 MG: 50 TABLET, EXTENDED RELEASE ORAL at 09:13

## 2025-04-24 RX ADMIN — LEVETIRACETAM 500 MG: 500 TABLET, FILM COATED, EXTENDED RELEASE ORAL at 09:13

## 2025-04-24 RX ADMIN — ENOXAPARIN SODIUM 40 MG: 40 INJECTION SUBCUTANEOUS at 16:50

## 2025-04-24 RX ADMIN — PANTOPRAZOLE SODIUM 40 MG: 40 TABLET, DELAYED RELEASE ORAL at 05:47

## 2025-04-24 ASSESSMENT — COGNITIVE AND FUNCTIONAL STATUS - GENERAL
DRESSING REGULAR UPPER BODY CLOTHING: A LITTLE
TURNING FROM BACK TO SIDE WHILE IN FLAT BAD: A LITTLE
TOILETING: A LOT
EATING MEALS: A LITTLE
CLIMB 3 TO 5 STEPS WITH RAILING: A LOT
HELP NEEDED FOR BATHING: A LITTLE
TURNING FROM BACK TO SIDE WHILE IN FLAT BAD: A LITTLE
DAILY ACTIVITIY SCORE: 17
STANDING UP FROM CHAIR USING ARMS: A LOT
MOBILITY SCORE: 17
MOVING TO AND FROM BED TO CHAIR: A LITTLE
WALKING IN HOSPITAL ROOM: A LOT
STANDING UP FROM CHAIR USING ARMS: A LITTLE
PERSONAL GROOMING: A LITTLE
DRESSING REGULAR LOWER BODY CLOTHING: A LITTLE
MOVING FROM LYING ON BACK TO SITTING ON SIDE OF FLAT BED WITH BEDRAILS: A LITTLE
CLIMB 3 TO 5 STEPS WITH RAILING: A LOT
MOVING TO AND FROM BED TO CHAIR: A LOT
MOBILITY SCORE: 15
WALKING IN HOSPITAL ROOM: A LITTLE

## 2025-04-24 ASSESSMENT — PAIN - FUNCTIONAL ASSESSMENT
PAIN_FUNCTIONAL_ASSESSMENT: 0-10
PAIN_FUNCTIONAL_ASSESSMENT: 0-10

## 2025-04-24 ASSESSMENT — PAIN SCALES - GENERAL: PAINLEVEL_OUTOF10: 0 - NO PAIN

## 2025-04-24 NOTE — CONSULTS
"Consults    Primary Team:  IM    Admit Date: 4/19/2025    Emergency Contact:   Extended Emergency Contact Information  Primary Emergency Contact: Mary Sebastian  Home Phone: [phone]  Mobile Phone: [phone]  Relation: Child  Secondary Emergency Contact: Micah Reddy   [place]  Mobile Phone: [phone]  Relation: Child     Reason For Consult: Agitation, soundowning, undiagnosed dementia.    History Of Present Illness:  Ann Marie Murphy is a 89 y.o. female presenting with ***.    History per patient:  Patient states that she is here because \"they asked her to come\".  She is not sure who asked.  She is a poor historian.  Patient states she is not feeling good - she reports that her back has been hurting.  Patient has been having difficulties with her meory at times.      At baseline:  3 weeks ago, patient was self sufficient, however, does not drive. 3 weeks ago, patient called daughter and did not remember what day it was.  Patient has not been wanting to leave her home at times.  Patient then had issues with figuring out not to call her - she was trying to use a TV remote to call her daughter.  About 1.5 weeks ago, daughter was called by her cousin to come and see her mom.  Patient was not sure something was not right.  Patient was not sure why she was at her home.  Daughter tried to take patient into the ED on Thursday and Friday - patient did not want to go.  Patient had a similar spell on Saturday, when she was confused after waking up.  Daughter became concerned and drove patient to the ED.  Daughter thinks patient's level of confusion has va escalating.      Medication changes - patient went to the pain clinic - patient wanted more pain medications, however, pain management declined.  Patient also had hypercalcemia - hydrochlorothiazide was stopped 6 weeks ago.  Patient also was told to start tylenol arthritis strength in addition to tramadol to help with pain. Patient has been " hallucinating at times.      Patient used to drink one pint of alcohol a day.  Patient stopped drinking >10 years ago.  Patient has had poor appetite.  Patient has been feeling down and depressed. No SI / HI.      History per family/caregiver: (obtained in addition due to patient’s ***)  ***    What matters most to the patient:  ***     Prior to Admission Meds  Prior to Admission Medications   Prescriptions Last Dose Informant Patient Reported? Taking?   Lactobacillus acidophilus capsule Not Taking  Yes No   Sig: Take 1 capsule by mouth once daily.   Patient not taking: Reported on 4/21/2025   RABEprazole (Aciphex) EC tablet Not Taking  Yes No   Sig: Take 1 tablet (20 mg) by mouth once daily.   Patient not taking: Reported on 4/21/2025   acetaminophen (Tylenol) 325 mg tablet Past Week  Yes Yes   Sig: Take 2 tablets (650 mg) by mouth every 4 hours if needed for mild pain (1 - 3) or moderate pain (4 - 6).   anastrozole (Arimidex) 1 mg tablet 4/18/2025 Morning  No No   Sig: Take 1 tablet (1 mg total) by mouth once daily.   ascorbic acid, vitamin C, 500 mg capsule 4/18/2025 Morning  Yes No   Sig: Take 1 capsule by mouth once daily.   aspirin 81 mg EC tablet 4/18/2025 Morning  Yes No   Sig: Take 1 tablet (81 mg) by mouth once daily.   atorvastatin (Lipitor) 10 mg tablet 4/18/2025 Bedtime  No No   Sig: Take 1 tablet (10 mg) by mouth once daily at bedtime.   cholecalciferol (Vitamin D-3) 50 MCG (2000 UT) tablet 4/18/2025 Morning  Yes No   Sig: Take 1 tablet (50 mcg) by mouth once daily.   famotidine (Pepcid) 20 mg tablet Not Taking  Yes No   Sig: Take 1 tablet (20 mg) by mouth once daily.   Patient not taking: Reported on 4/21/2025   fluticasone (Flonase Allergy Relief) 50 mcg/actuation nasal spray Not Taking  Yes No   Sig: Administer 2 sprays into each nostril once daily as needed for rhinitis or allergies.   Patient not taking: Reported on 4/21/2025   levETIRAcetam (Keppra) 500 mg tablet 4/18/2025 Morning  No No   Sig:  TAKE 2 TABLETS BY MOUTH DAILY   levETIRAcetam XR (Keppra XR) 500 mg 24 hr tablet Not Taking  No No   Sig: TAKE 2 TABLETS(1000 MG) BY MOUTH EVERY DAY   Patient not taking: Reported on 4/21/2025   levETIRAcetam XR (Keppra XR) 500 mg 24 hr tablet Not Taking  No No   Sig: do not crush, chew, or split.TAKE 2 TABLETS(1000 MG) BY MOUTH EVERY DAY   Patient not taking: Reported on 4/21/2025   metoprolol succinate XL (Toprol-XL) 100 mg 24 hr tablet 4/18/2025 Morning  No No   Sig: TAKE 1 TABLET(100 MG) BY MOUTH EVERY DAY   midodrine (Proamatine) 10 mg tablet Not Taking  Yes No   Sig: Per instructions AS NEEDED (route: oral)   Patient not taking: Reported on 4/21/2025   mirtazapine (Remeron) 15 mg tablet Not Taking  Yes No   Sig: Take 0.5 tablets (7.5 mg) by mouth once daily at bedtime.   Patient not taking: Reported on 4/21/2025   naloxone (Narcan) 4 mg/0.1 mL nasal spray Not Taking  Yes No   Sig: Administer 1 spray (4 mg) into affected nostril(s) if needed for opioid reversal or respiratory depression.   Patient not taking: Reported on 4/21/2025   traMADol (Ultram) 50 mg tablet Not Taking  No No   Sig: Take 1-2 tablets ( mg) by mouth 3 times a day as needed for severe pain (7 - 10). Do not fill before March 22, 2025.   Patient not taking: Reported on 4/21/2025   valsartan (Diovan) 320 mg tablet 4/18/2025 Morning  No No   Sig: Take 1 tablet (320 mg) by mouth once daily.      Facility-Administered Medications: None        Current Meds in Hospital  Current Medications[1]     The patient's outpatient electronic medical record (EMR) is reviewed, notable information includes ***.      Past Medical History  Medical History[2]     Surgical History  Surgical History[3]     Family History  Her family history includes Arrhythmia in her brother; Carcinoma of the pancreas in her sister; Kidney cancer in her sister; Lung cancer in her father; Parkinson disease in her brother; Prostate cancer in her brother.     Social History  She  reports that she has quit smoking. Her smoking use included cigarettes. She has never used smokeless tobacco. She reports that she does not currently use alcohol. She reports that she does not currently use drugs.  -Alcohol use: Yes - used to drink a lot >10 years ago.    -Tobacco use: No  -Illicit drug use: No  -Exercise: ####  -Spiritual needs: ####  -Marital Status:  - June 2021.  Occupation: [employer] - .    Highest Level of Education: High School  Community Resources: no aids  : No  Current living environment: Patient lives in an apartment - no help at home.     Activities of Daily Living:  Basic ADLs: (I= independent, A= assistance, D= dependent)  Bathing: I, Dressing: I, Toileting: I, Transferring: I, Continence: I, Feeding: I    Sims Index:  6  Instrumental ADLs: (I= independent, A= assistance, D= dependent)  Ability to use phone: I, Shopping: D, Cooking: I, Housekeeping: I, Laundry: D, Transportation: D, Medications: I, Handle Finances: I   Ovi Scale:  5    Allergies  Adhesive tape-silicones, Cortisone, Diphenhydramine, Latex, and Morphine    Review of Systems  Back pain.     Documents on file and valid:  Advance Directive/Living Will: Yes   Health Care Power of : Yes - daughter Mary Myers  Code Status: Full Code    {Link to Stroke Scoring tools - Link :99}  Confusion Assessment Method (CAM)  Not on file.    Lando Cognitive Assessment (MoCA)  Not on file.    Geriatric Depression Scale (GDS)  Not on file.    Mini-Cog (Early Dementia Detection)  Not on file.    Folstein Mini-Mental State Exam (MMSE)  Not on file.    Patient Health Questionnaire (PHQ-9)  Not on file.     Physical Exam    Last Recorded Vitals      4/23/2025     8:34 AM 4/23/2025     3:35 PM 4/23/2025     9:20 PM 4/24/2025    12:12 AM 4/24/2025     3:24 AM 4/24/2025     7:27 AM 4/24/2025    11:15 AM   Vitals   Systolic 153 116 126 132 150 134 121   Diastolic 92 70 70 75 70 70 74   BP Location  Left arm Right arm Left arm Left arm Left arm Left arm Left arm   Heart Rate 81 85 83 81 76 88 57   Temp 36.6 °C (97.9 °F) 36.4 °C (97.6 °F) 36.7 °C (98.1 °F) 36.2 °C (97.1 °F) 36.6 °C (97.9 °F) 37 °C (98.6 °F) 36.8 °C (98.3 °F)   Resp 18 17 18 17 18 17 17      Vitals:    04/19/25 1110   Weight: 54.4 kg (120 lb)        Confusion Assessment Method(CAM) for diagnosis of delirium:    1.  Acute onset or fluctuating course: present  2.  Inattention: absent  3.  Disorganized thinking: absent  4.  Altered level of consciousness: absent  CAM: negative    AT Score For Assessment of Delirium and Cognitive Impairment:    Alertness: 0  Normal(fully alert,but not agitated, throughout assessment)=0  Mild sleepiness for <10 seconds after walking, then normal=0  Clearly abnormal=4  2.  AMT4: 1  No mistakes=0  One mistake=1  Two or more mistakes/untestable=2  3.  Attention: 1  Achieves seven months or more correctly=0  Starts but scores <7 months/ refuses to start=1  Untestable(cannot start because unwell, drowsy, inattentive)=2  4.  Acute: 0  No=0  Yes=4    Total Score: 2  4 or above: Possible delirium +/- cognitive impairment  1-3: Possible cognitive impairment  0: Delirium or severe cognitive impairment unlikely(but delirium still possible if (4) information incomplete)     Relevant Results  Lab Results   Component Value Date    TSH 0.45 04/21/2025    DSAFHZSZ88 1,545 (H) 04/21/2025    VITD25 51 09/12/2022     Results from last 7 days   Lab Units 04/24/25  0606 04/23/25  0735 04/22/25  0607 04/20/25  1032 04/19/25  1134   WBC AUTO x10*3/uL 9.8 8.2 9.5   < > 8.6   HEMOGLOBIN g/dL 13.3 13.7 13.0   < > 13.6   HEMATOCRIT % 42.0 43.0 41.1   < > 41.5   ALT U/L  --   --   --   --  36   AST U/L  --   --   --   --  59*   SODIUM mmol/L 144 144 145   < > 143   POTASSIUM mmol/L 4.0 4.5 4.0   < > 3.7   CHLORIDE mmol/L 111* 114* 113*   < > 109*   CREATININE mg/dL 0.71 0.74 0.74   < > 0.87   BUN mg/dL 28* 21 22   < > 12   CO2 mmol/L 28 25 26    < > 24    < > = values in this interval not displayed.       Recent Imaging Results      MR brain wo IV contrast  Narrative: Interpreted By:  Zach Galvan,   STUDY:  MR BRAIN WO IV CONTRAST;  4/20/2025 4:25 pm      INDICATION:  Signs/Symptoms:Left hemiataxia and encephalopathy.          COMPARISON:  April 19, 2025      ACCESSION NUMBER(S):  [id number]      ORDERING CLINICIAN:  JACQUIE GARLAND      TECHNIQUE:  Multiplaner, multisequence MRI were obtained without contrast      FINDINGS:  Parenchyma:  There is a background of age-related volume loss and  presumed ischemic white matter demyelination.      Ventricles: There is no hydrocephalus.      Extra-axial spaces: No abnormal extra-axial fluid collection. Basal  cisterns are patent.      Vessels: T2 flow voids are maintained.      Orbits: The imaged orbits are unremarkable.      Paranasal sinuses and mastoid air cells: No fluid levels are present.      Skull: There is normal T1 marrow signal without evidence of  aggressive lesion.      Soft tissues: Unremarkable              Impression: 1. Negative brain MRI examination for acute change.  2. There is a background of age-related volume loss, atherosclerotic  disease, and ischemic white matter demyelination.      MACRO:  None      Signed by: Zach Gavlan 4/20/2025 4:54 PM  Dictation workstation:   [id number]      Head/Brain Imaging  === Results for orders placed during the hospital encounter of 04/19/25 ===    MR brain wo IV contrast [[id number]] 04/20/2025    Status: Normal  1. Negative brain MRI examination for acute change.  2. There is a background of age-related volume loss, atherosclerotic  disease, and ischemic white matter demyelination.    MACRO:  None    Signed by: Zach Galvan 4/20/2025 4:54 PM  Dictation workstation:   [id number]  No results found for this or any previous visit.        DATA:  EKG: QTC  No results found for this or any previous visit (from the past 4464 hours).  Anti-psychotics in  48 hours:  Opioids/Benzodiazepines in 48 hours:  Anticholinergics on board:{YES wildcard/NO:60}  Restraints:{YES wildcard/NO:60}  Indwelling catheters:{YES wildcard/NO:60}  Last BM:  UO in 24 hours:  Activity in the past 24 hours:  Need for ambulatory devices:    Assessment/Plan   This is a/an 89 y.o. year old female, with past medical history relevant for ***, presenting for ***, being seen in geriatric consultation for ***. ***    Principal Problem:    Confusion    1. ***  Plan:   2. ***  Plan:   3. ***  Plan:     Care Transitions:  -Recommended level for discharge: ***  -Home going considerations: ***  -Primary care physician: ***    Goals of Care:  -Health care power of :  -Living will:  Code status:    4M AGE-FRIENDLY INITIATIVE:  What matters most to patient:   Medications:   Mentation:   Mobility:       Geriatric medicine will continue to follow the patient. Thank you for allowing geriatric medicine to be involved in the care of your patient. Geriatric medicine consultation team is available during work hours Monday through Friday. For any emergency issues requiring immediate assistance over the weekend, please page Geriatrics pager [phone]    Consult Billing Time  Prep time on date of patient encounter(minutes):  Time directly with patient/family/caregiver(minutes):  Documentation time(minutes):  TOTAL TIME(minutes):    David Ballesteros MD         [1]   Current Facility-Administered Medications   Medication Dose Route Frequency Provider Last Rate Last Admin    acetaminophen (Tylenol) tablet 650 mg  650 mg oral q4h PRN James Galindo MD   650 mg at 04/21/25 2019    anastrozole (Arimidex) tablet 1 mg  1 mg oral Daily James Galindo MD   1 mg at 04/24/25 0913    aspirin EC tablet 81 mg  81 mg oral Daily James Galindo MD   81 mg at 04/24/25 0913    atorvastatin (Lipitor) tablet 10 mg  10 mg oral Nightly James Galindo MD   10 mg at 04/23/25 2128    cholecalciferol (Vitamin D-3) tablet 50 mcg   50 mcg oral Daily James Galindo MD   50 mcg at 04/24/25 0913    enoxaparin (Lovenox) syringe 40 mg  40 mg subcutaneous q24h James Galindo MD   40 mg at 04/23/25 1604    levETIRAcetam XR (Keppra XR) 24 hr tablet 500 mg  500 mg oral BID James Galindo MD   500 mg at 04/24/25 0913    metoprolol succinate XL (Toprol-XL) 24 hr tablet 100 mg  100 mg oral Daily Cara Saleh PA-C   100 mg at 04/24/25 0913    mirtazapine (Remeron) tablet 7.5 mg  7.5 mg oral Nightly James Galindo MD   7.5 mg at 04/23/25 2128    ondansetron (Zofran) tablet 4 mg  4 mg oral q8h PRN James Galindo MD        Or    ondansetron (Zofran) injection 4 mg  4 mg intravenous q8h PRN James Galindo MD        pantoprazole (ProtoNix) EC tablet 40 mg  40 mg oral Daily before breakfast James Galindo MD   40 mg at 04/24/25 0547    polyethylene glycol (Glycolax, Miralax) packet 17 g  17 g oral Daily James Galindo MD   17 g at 04/21/25 0959    QUEtiapine (SEROquel) tablet 25 mg  25 mg oral BID Cara Saleh PA-C   25 mg at 04/24/25 0913   [2]   Past Medical History:  Diagnosis Date    Anxiety disorder due to known physiological condition     Anxiety disorder due to a general medical condition    Breast cancer     Cardiomyopathy, unspecified     Cardiomyopathy    Diverticulosis of intestine, part unspecified, without perforation or abscess without bleeding     Diverticulosis    Encounter for screening mammogram for malignant neoplasm of breast     Visit for screening mammogram    Localization-related (focal) (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, not intractable, without status epilepticus     Complex partial seizure    Other conditions influencing health status     Ovarian Torsion On The Left    Other conditions influencing health status     Stroke syndrome    Other specified noninflammatory disorders of uterus     Fibrosis of uterus    Personal history of other diseases of the  circulatory system     History of hypertension    Personal history of other diseases of the circulatory system     History of hypertrophic cardiomyopathy    Personal history of other diseases of the digestive system     History of cholelithiasis    Personal history of other diseases of the digestive system     History of hemorrhoids    Personal history of other diseases of the digestive system     History of hiatal hernia    Personal history of other diseases of the digestive system     History of esophageal reflux    Personal history of other diseases of the female genital tract 01/14/2020    History of breast pain    Personal history of other endocrine, nutritional and metabolic disease     History of hypercholesterolemia    Personal history of other endocrine, nutritional and metabolic disease 03/24/2016    History of Cushing's syndrome    Personal history of transient ischemic attack (TIA), and cerebral infarction without residual deficits     History of transient cerebral ischemia    Unspecified convulsions (Multi)     Generalized convulsive seizure   [3]   Past Surgical History:  Procedure Laterality Date    APPENDECTOMY  01/02/2014    Appendectomy    BREAST BIOPSY Right 08/05/2022    Invasive Ductal Carinoma    COLONOSCOPY  01/02/2014    Complete Colonoscopy    EXPLORATORY LAPAROTOMY  01/02/2014    Exploratory Laparotomy    HERNIA REPAIR  01/02/2014    Hernia Repair    HYSTERECTOMY  01/02/2014    Hysterectomy    OTHER SURGICAL HISTORY  01/02/2014    Upper Gastrointestinal Endoscopy (Therapeutic)    OTHER SURGICAL HISTORY  01/02/2014    Surgery    UMBILICAL HERNIA REPAIR  01/02/2014    Umbilical Hernia Repair      Nightly James Galindo MD   7.5 mg at 04/23/25 2128    ondansetron (Zofran) tablet 4 mg  4 mg oral q8h PRN James Galindo MD        Or    ondansetron (Zofran) injection 4 mg  4 mg intravenous q8h PRN James Galindo MD        pantoprazole (ProtoNix) EC tablet 40 mg  40 mg oral Daily before breakfast James Galindo MD   40 mg at 04/24/25 0547    polyethylene glycol (Glycolax, Miralax) packet 17 g  17 g oral Daily James Galindo MD   17 g at 04/21/25 0959    QUEtiapine (SEROquel) tablet 25 mg  25 mg oral BID Cara Saleh PA-C   25 mg at 04/24/25 0913   [2]   Past Medical History:  Diagnosis Date    Anxiety disorder due to known physiological condition     Anxiety disorder due to a general medical condition    Breast cancer     Cardiomyopathy, unspecified     Cardiomyopathy    Diverticulosis of intestine, part unspecified, without perforation or abscess without bleeding     Diverticulosis    Encounter for screening mammogram for malignant neoplasm of breast     Visit for screening mammogram    Localization-related (focal) (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, not intractable, without status epilepticus     Complex partial seizure    Other conditions influencing health status     Ovarian Torsion On The Left    Other conditions influencing health status     Stroke syndrome    Other specified noninflammatory disorders of uterus     Fibrosis of uterus    Personal history of other diseases of the circulatory system     History of hypertension    Personal history of other diseases of the circulatory system     History of hypertrophic cardiomyopathy    Personal history of other diseases of the digestive system     History of cholelithiasis    Personal history of other diseases of the digestive system     History of hemorrhoids    Personal history of other diseases of the digestive system     History of hiatal hernia    Personal history of other diseases of the  digestive system     History of esophageal reflux    Personal history of other diseases of the female genital tract 01/14/2020    History of breast pain    Personal history of other endocrine, nutritional and metabolic disease     History of hypercholesterolemia    Personal history of other endocrine, nutritional and metabolic disease 03/24/2016    History of Cushing's syndrome    Personal history of transient ischemic attack (TIA), and cerebral infarction without residual deficits     History of transient cerebral ischemia    Unspecified convulsions (Multi)     Generalized convulsive seizure   [3]   Past Surgical History:  Procedure Laterality Date    APPENDECTOMY  01/02/2014    Appendectomy    BREAST BIOPSY Right 08/05/2022    Invasive Ductal Carinoma    COLONOSCOPY  01/02/2014    Complete Colonoscopy    EXPLORATORY LAPAROTOMY  01/02/2014    Exploratory Laparotomy    HERNIA REPAIR  01/02/2014    Hernia Repair    HYSTERECTOMY  01/02/2014    Hysterectomy    OTHER SURGICAL HISTORY  01/02/2014    Upper Gastrointestinal Endoscopy (Therapeutic)    OTHER SURGICAL HISTORY  01/02/2014    Surgery    UMBILICAL HERNIA REPAIR  01/02/2014    Umbilical Hernia Repair

## 2025-04-24 NOTE — PROGRESS NOTES
Physical Therapy    Physical Therapy Treatment    Patient Name: Ann Marie Murphy  MRN: [id number]  Department: [place] [id number]  Room: [address] [place] [address]  Today's Date: 4/24/2025  Time Calculation  Start Time: 1032  Stop Time: 1057  Time Calculation (min): 25 min         Assessment/Plan   PT Assessment  Rehab Prognosis: Good  Barriers to Discharge Home: Caregiver assistance, Physical needs, Cognition needs  Caregiver Assistance: Patient lives alone and/or does not have reliable caregiver assistance  Cognition Needs: 24hr supervision for safety awareness needed, Insight of patient limited regarding functional ability/needs, Cognition-related high falls risk  Physical Needs: Stair navigation into home limited by function/safety, In-home setup navigation limited by function/safety, 24hr mobility assistance needed, Ambulating household distances limited by function/safety, 24hr ADL assistance needed, High falls risk due to function or environment  End of Session Communication: Bedside nurse  Assessment Comment: Pt demonstrates improvements in command following and balance this date although continues to display confusion and poor safety. Pt requries Fabrizio for ambulation due to poor safety, unsteadiness, and WW management  End of Session Patient Position: Bed, 3 rail up, Alarm on  PT Plan  Inpatient/Swing Bed or Outpatient: Inpatient  PT Plan  Treatment/Interventions: Bed mobility, Transfer training, Gait training  PT Plan: Ongoing PT  PT Frequency: 3 times per week  PT Discharge Recommendations: Moderate intensity level of continued care  PT Recommended Transfer Status: Assist x1  PT - OK to Discharge: Yes (per POC)      General Visit Information:   PT  Visit  PT Received On: 04/24/25  General  Reason for Referral: To ED with new onset full body shaking and confusion. Pt also reporting abdominal pain with imaging showing cholelithiasis. Brain imaging (-) for acute processes.  Referred By: James Galindo MD  Past Medical History  "Relevant to Rehab: Medical History[1]    Prior to Session Communication: Bedside nurse  Patient Position Received: Bed, 3 rail up, Alarm on  General Comment: pleasant and cooperative, very poor attention span, requires frequent redirection    Subjective   Precautions:  Precautions  Hearing/Visual Limitations: [place]  Medical Precautions: Fall precautions          Objective   Pain:  Pain Assessment  Pain Assessment: 0-10  0-10 (Numeric) Pain Score:  (pt reports pain in the abdomen and low back, unable to report number)  Cognition:  Cognition  Orientation Level: Disoriented to time, Disoriented to situation (pt able to state \"hospital\")       Postural Control:  Static Sitting Balance  Static Sitting-Balance Support: Bilateral upper extremity supported  Static Sitting-Level of Assistance: Close supervision  Static Standing Balance  Static Standing-Balance Support: Bilateral upper extremity supported  Static Standing-Level of Assistance: Contact guard       Treatments:  Therapeutic Exercise  Therapeutic Exercise Performed: Yes  Therapeutic Exercise Activity 1: Pt performed seated x15 reps AP, LAQ, hip ABD/ADD, and alternating marches. Pt required max VC for pacing and technique.       Bed Mobility  Bed Mobility: Yes  Bed Mobility 1  Bed Mobility 1: Supine to sitting  Level of Assistance 1: Close supervision  Bed Mobility Comments 1: HOB elevated. Pt required min VC for correct sequencing and max VC for safety, pt impulsive.  Bed Mobility 2  Bed Mobility  2: Sitting to supine  Level of Assistance 2: Close supervision  Bed Mobility Comments 2: Good trunk control, no physical assist required    Ambulation/Gait Training  Ambulation/Gait Training Performed: Yes  Ambulation/Gait Training 1  Surface 1: Level tile  Device 1: Rolling walker  Gait Support Devices: Gait belt  Assistance 1: Minimum assistance  Quality of Gait 1: Diminished heel strike, Inconsistent stride length, Decreased step length, Shuffling gait, Forward flexed " posture  Comments/Distance (ft) 1: 40 x 2 (Pt demonstrates improvements in stability and posture since last visit. Pt continues to require VC/TC for safety and sequencing and Fabrizio for unsteadiness and WW management)  Transfers  Transfer: Yes  Transfer 1  Transfer From 1: Bed to, Chair with arms to  Transfer to 1: Stand  Technique 1: Sit to stand, Stand to sit  Transfer Device 1: Walker, Gait belt  Transfer Level of Assistance 1: Contact guard  Trials/Comments 1: Pt requires VC for correct hand placement and safety. Pt impulsive.    Outcome Measures:  [facility] Basic Mobility  Turning from your back to your side while in a flat bed without using bedrails: A little  Moving from lying on your back to sitting on the side of a flat bed without using bedrails: A little  Moving to and from bed to chair (including a wheelchair): A little  Standing up from a chair using your arms (e.g. wheelchair or bedside chair): A little  To walk in hospital room: A little  Climbing 3-5 steps with railing: A lot  Basic Mobility - Total Score: 17    Education Documentation  Body Mechanics, taught by Arlen Colorado PTA at 4/24/2025  1:22 PM.  Learner: Patient  Readiness: Acceptance  Method: Explanation  Response: Needs Reinforcement, No Evidence of Learning    Mobility Training, taught by Arlen Colorado PTA at 4/24/2025  1:22 PM.  Learner: Patient  Readiness: Acceptance  Method: Explanation  Response: Needs Reinforcement, No Evidence of Learning    Education Comments  No comments found.        Encounter Problems       Encounter Problems (Active)       Balance       complete all mobility with normal balance while dual tasking, negotiating in a dynamic environment, carrying items, etc., with proactive and reactive static and dynamic standing and sitting tasks, with mod I and LRAD, >15 minutes.   (Progressing)       Start:  04/21/25    Expected End:  05/05/25            Pt will scores >/= to 24/28 on the Tinetti Balance assessement in  order to demonstrate decreased risk of falls.        Start:  04/21/25    Expected End:  05/05/25               Mobility       STG - Patient will ambulate 150 ft mod I using LRAD (Progressing)       Start:  04/21/25    Expected End:  05/05/25            STG - Patient will ascend and descend a flight of stairs mod I using unilateral HR and LRAD (Not Progressing)       Start:  04/21/25    Expected End:  05/05/25               PT Transfers       STG - Patient will perform bed mobility independently.  (Progressing)       Start:  04/21/25    Expected End:  05/05/25            STG - Patient will transfer sit to and from stand mod I using a LRAD (Progressing)       Start:  04/21/25    Expected End:  05/05/25               Pain - Adult                   [1]   Past Medical History:  Diagnosis Date    Anxiety disorder due to known physiological condition     Anxiety disorder due to a general medical condition    Breast cancer     Cardiomyopathy, unspecified     Cardiomyopathy    Diverticulosis of intestine, part unspecified, without perforation or abscess without bleeding     Diverticulosis    Encounter for screening mammogram for malignant neoplasm of breast     Visit for screening mammogram    Localization-related (focal) (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, not intractable, without status epilepticus     Complex partial seizure    Other conditions influencing health status     Ovarian Torsion On The Left    Other conditions influencing health status     Stroke syndrome    Other specified noninflammatory disorders of uterus     Fibrosis of uterus    Personal history of other diseases of the circulatory system     History of hypertension    Personal history of other diseases of the circulatory system     History of hypertrophic cardiomyopathy    Personal history of other diseases of the digestive system     History of cholelithiasis    Personal history of other diseases of the  digestive system     History of hemorrhoids    Personal history of other diseases of the digestive system     History of hiatal hernia    Personal history of other diseases of the digestive system     History of esophageal reflux    Personal history of other diseases of the female genital tract 01/14/2020    History of breast pain    Personal history of other endocrine, nutritional and metabolic disease     History of hypercholesterolemia    Personal history of other endocrine, nutritional and metabolic disease 03/24/2016    History of Cushing's syndrome    Personal history of transient ischemic attack (TIA), and cerebral infarction without residual deficits     History of transient cerebral ischemia    Unspecified convulsions (Multi)     Generalized convulsive seizure

## 2025-04-24 NOTE — DISCHARGE SUMMARY
ADMISSION DATE: 4/19/2025     DISCHARGE DATE:  04/24/25     Discharge Diagnosis  Acute confusion and delirium  New onset dementia and behavior changes  Hypertension  Hypothyroidism  Diabetes mellitus type 2  History of seizure disorder    Issues Requiring Follow-Up  Discharge to skilled nursing facility        Discharge Meds     Your medication list        START taking these medications        Instructions Last Dose Given Next Dose Due   QUEtiapine 25 mg tablet  Commonly known as: SEROquel      Take 0.5 tablets (12.5 mg) by mouth 2 times a day.       sertraline 25 mg tablet  Commonly known as: Zoloft      Take 1 tablet (25 mg) by mouth once daily.              CONTINUE taking these medications        Instructions Last Dose Given Next Dose Due   acetaminophen 325 mg tablet  Commonly known as: Tylenol           anastrozole 1 mg tablet  Commonly known as: Arimidex      Take 1 tablet (1 mg total) by mouth once daily.       ascorbic acid (vitamin C) 500 mg capsule           aspirin 81 mg EC tablet           atorvastatin 10 mg tablet  Commonly known as: Lipitor      Take 1 tablet (10 mg) by mouth once daily at bedtime.       cholecalciferol 50 mcg (2,000 units) tablet  Commonly known as: Vitamin D-3           levETIRAcetam 500 mg tablet  Commonly known as: Keppra      TAKE 2 TABLETS BY MOUTH DAILY       metoprolol succinate  mg 24 hr tablet  Commonly known as: Toprol-XL      TAKE 1 TABLET(100 MG) BY MOUTH EVERY DAY       valsartan 320 mg tablet  Commonly known as: Diovan      Take 1 tablet (320 mg) by mouth once daily.              STOP taking these medications      famotidine 20 mg tablet  Commonly known as: Pepcid        Flonase Allergy Relief 50 mcg/actuation nasal spray  Generic drug: fluticasone        lactobacillus acidophilus capsule        midodrine 10 mg tablet  Commonly known as: Proamatine        naloxone 4 mg/0.1 mL nasal spray  Commonly known as: Narcan        RABEprazole EC tablet  Commonly known as:  Aciphex        Remeron 15 mg tablet  Generic drug: mirtazapine        traMADol 50 mg tablet  Commonly known as: Ultram                  Where to Get Your Medications        These medications were sent to [facility]  [address], [place] [id number]      Hours: 8 AM to 6 PM Mon-Fri, 9 AM to 1 PM Saturday Phone: [phone]   QUEtiapine 25 mg tablet  sertraline 25 mg tablet             Results for orders placed or performed during the hospital encounter of 04/19/25 (from the past 24 hours)   CBC   Result Value Ref Range    WBC 9.8 4.4 - 11.3 x10*3/uL    nRBC 0.0 0.0 - 0.0 /100 WBCs    RBC 4.24 4.00 - 5.20 x10*6/uL    Hemoglobin 13.3 12.0 - 16.0 g/dL    Hematocrit 42.0 36.0 - 46.0 %    MCV 99 80 - 100 fL    MCH 31.4 26.0 - 34.0 pg    MCHC 31.7 (L) 32.0 - 36.0 g/dL    RDW 13.5 11.5 - 14.5 %    Platelets 196 150 - 450 x10*3/uL   Basic metabolic panel   Result Value Ref Range    Glucose 113 (H) 74 - 99 mg/dL    Sodium 144 136 - 145 mmol/L    Potassium 4.0 3.5 - 5.3 mmol/L    Chloride 111 (H) 98 - 107 mmol/L    Bicarbonate 28 21 - 32 mmol/L    Anion Gap 9 (L) 10 - 20 mmol/L    Urea Nitrogen 28 (H) 6 - 23 mg/dL    Creatinine 0.71 0.50 - 1.05 mg/dL    eGFR 82 >60 mL/min/1.73m*2    Calcium 9.9 8.6 - 10.3 mg/dL    MR brain wo IV contrast  Result Date: 4/20/2025  Interpreted By:  Zach Galvan, STUDY: MR BRAIN WO IV CONTRAST;  4/20/2025 4:25 pm   INDICATION: Signs/Symptoms:Left hemiataxia and encephalopathy.     COMPARISON: April 19, 2025   ACCESSION NUMBER(S): [id number]   ORDERING CLINICIAN: JACQUIE GARLAND   TECHNIQUE: Multiplaner, multisequence MRI were obtained without contrast   FINDINGS: Parenchyma:  There is a background of age-related volume loss and presumed ischemic white matter demyelination.   Ventricles: There is no hydrocephalus.   Extra-axial spaces: No abnormal extra-axial fluid collection. Basal cisterns are patent.   Vessels: T2 flow voids are maintained.   Orbits: The imaged  orbits are unremarkable.   Paranasal sinuses and mastoid air cells: No fluid levels are present.   Skull: There is normal T1 marrow signal without evidence of aggressive lesion.   Soft tissues: Unremarkable           1. Negative brain MRI examination for acute change. 2. There is a background of age-related volume loss, atherosclerotic disease, and ischemic white matter demyelination.   MACRO: None   Signed by: Zach Galvan 4/20/2025 4:54 PM Dictation workstation:   [id number]    CT abdomen pelvis w IV contrast  Result Date: 4/19/2025  Interpreted By:  Dieter Levine, STUDY: CT ABDOMEN PELVIS W IV CONTRAST;  4/19/2025 12:46 pm   INDICATION: Signs/Symptoms:Tenderness over suprapubic region and right lower quadrant.   COMPARISON: 02/13/2023   ACCESSION NUMBER(S): [id number]   ORDERING CLINICIAN: JJ ROWLEY   TECHNIQUE: CT of the abdomen and pelvis was performed. Contiguous axial images were obtained at 3 mm slice thickness through the abdomen and pelvis. Coronal and sagittal reconstructions at 3 mm slice thickness were performed.  75 ML of Omnipaque 350 was administered intravenously without immediate complication.   FINDINGS: LOWER CHEST: Moderate emphysema and fibrosis similar to the previous exam. The ascending aorta is mildly dilated measuring up to 4 cm in diameter, without significant atherosclerotic calcification.   ABDOMEN:   LIVER: A slightly lobulated 1.6 cm hypodensity is present at the right liver lobe, corresponding to hypodensity on the prior examination and with what appear to be several foci of nodular enhancement most consistent with a hemangioma. The hepatic parenchyma otherwise is homogeneous.The hepatic size is normal.   SPLEEN: The spleen is normal in size and homogeneous.   ADRENAL GLANDS: Bilateral adrenal glands appear normal.   KIDNEYS AND URETERS: There are several small cortical cysts.  Additionally there are several hypodensities too small to definitively characterize, but statistically  benign such as cysts. The renal cortices otherwise are maintained..   The ureters throughout their distal course are not well identified due to a paucity of intra-abdominal fat and overlying crowded bowel loops, but the tracts otherwise are unremarkable without identified ureteral dilatation or radiodense calculi.   PANCREAS: The pancreas appears unremarkable, there is no ductal dilatation or masses.   GALLBLADDER: Cholelithiasis is noted, but without wall thickening or pericholecystic fluid to indicate cholecystitis.   BILE DUCTS: There is no biliary dilatation or filling defects.     VESSELS: Moderate atherosclerotic calcification of the aorta and iliac vessels, and proximal segment of the left renal artery. This appears fairly similar to the prior exam.   PERITONEUM AND RETROPERITONEUM: No ascites or free air, no fluid collection.   There are several surgical clips at the left upper abdomen posteriorly similar to the previous exam. No significant retroperitoneal adenopathy.   No enlarged mesenteric lymph nodes.   BOWEL: Mild diverticulosis of the sigmoid and descending colon, fairly similar to the prior exam. The bowel otherwise is unremarkable without significant dilatation or mural thickening.   PELVIS:   BLADDER: The urinary bladder contour is smooth.   REPRODUCTIVE ORGANS: Status post hysterectomy.     BONE, ABDOMINAL WALL AND OTHER FINDINGS: No suspicious osseous lesions are identified.   Fat containing small left non incarcerated inguinal hernia is noted.This appears similar to the previous exam.       1.  Emphysema and fibrosis at the lung bases. Mild aneurysmal dilatation of the ascending aorta measuring 4 cm. 2. Cholelithiasis. 3. Diverticulosis. 4. Hepatic hemangioma. Nonincarcerated small left inguinal hernia. Otherwise no acute findings.   MACRO: 1. None   Signed by: Deiter Levine 4/19/2025 1:17 PM Dictation workstation:   [id number]    CT head wo IV contrast  Result Date: 4/19/2025  Interpreted By:   Dieter Levine, STUDY: CT HEAD WO IV CONTRAST;  4/19/2025 12:46 pm   INDICATION: Signs/Symptoms:Worsening confusion x 2 weeks, unsteady on feet.   COMPARISON: 10/24/2021   ACCESSION NUMBER(S): [id number]   ORDERING CLINICIAN: JJ ROWLEY   TECHNIQUE: Sequential trans axial images were obtained  .   FINDINGS: INTRACRANIAL:   There is moderate prominence of the cortical sulci indicating atrophy.   There is moderate ventriculomegaly, again consistent with atrophy.   Moderate decreased attenuation of the periventricular and long tracks of the white matter most consistent with gliosis from arterial disease. There is no evidence of definite subacute infarction, intracranial hemorrhage or mass.     EXTRACRANIAL: Moderate mucosal thickening of the right maxillary air cell with 1.4 cm nodularity indicating a polyp or mucous retention cyst. The calvarium is intact.       Moderate age-related degenerative change appears progressed from previous examination, but without acute findings.   Signed by: Dieter Levine 4/19/2025 1:07 PM Dictation workstation:   [id number]    XR lumbar spine 2-3 views  Result Date: 4/11/2025  Interpreted By:  Jareth Hess, STUDY: XR LUMBAR SPINE 2-3 VIEWS   INDICATION: Signs/Symptoms:lbp.   COMPARISON: None   ACCESSION NUMBER(S): [id number]   ORDERING CLINICIAN: JACQUIE CASEY   FINDINGS: Fairly advanced lumbar degenerative changes mostly L3-S1 with a mild scoliosis. No evidence of fracture or lesion.       Advanced lower lumbar degenerative changes with mild scoliosis.   Signed by: Jareth Hess 4/11/2025 7:01 AM Dictation workstation:   [id number]           Hospital Course   Confusion  89-year-old lady with history of hypertension, hyperlipidemia, previous breast carcinoma in remission on anastrozole, diverticulosis of colon and asymptomatic cholelithiasis seen on CT scan of abdomen, history of seizure disorder and chronic lower back pain for which she has been taking tramadol presents with  confusion and wobbly gait for the last 5 to 7 days and forgetfulness.  On examination she was noted to be intermittent confusion and left hemiataxia.     1.  Acute intermittent confusion could be medication related including tramadol which she has been taking 300 mg daily for many years for chronic back pain  However patient also has early dementia  She was quite agitated overnight and required Zyprexa currently she is fully awake alert.  Add Seroquel 25 mg twice daily and seek psychiatric consultation.     2.  Left hemiataxia and wobbly gait in a patient who has a history of seizure disorder,  CT of the brain was not suggestive of any acute stroke.  Patient has been started on aspirin and will continue with atorvastatin  Seen by neurology other day and MRI of the brain was ordered which was completed which revealed no evidence of acute stroke.     3.  Hypertension, blood pressures are elevated, will resume metoprolol and patient was also on valsartan which could be resumed if blood pressure persistently remain elevated.     4.  History of breast carcinoma in remission resumed anastrozole     5.  Chronic back pain, CT of the lumbar spine revealed no fractures, does have significant arthritis and degenerative disc disease, continue Tylenol for pain as needed and hold tramadol for the time being.     6.  Seizure Disorder, Continue with Keppra.     7.  History of chronic atrial fibrillation status post Watchman device.      PT OT.  Evaluated patient today.     Overall significant improvement in her condition since admission and MRI is negative for acute stroke and no bony injuries are noted.    However patient is agitated at nighttime and required Zyprexa, she has intermittent confusion, added Seroquel 25 mg twice daily, will seek psych consultation and she will need to go to skilled nursing facility.     Patient's daughter was in the room who is updated with patient's condition.     Total time spent in examination  counseling coordinating care for this patient was greater than 35 minutes        Pertinent Physical Exam At Time of Discharge  GENERAL:  Alert, no distress, cooperative, afebrile  SKIN:  Skin color, texture, turgor normal. No rashes or lesions.  OROPHARYNX:  Lips, mucosa, and tongue are normal.Teeth and gums, normal. Oropharynx normal.  NECK:  No jugulovenous distention, No carotid bruits, Carotid pulse normal contour, Supple  LUNGS:  Lungs clear to auscultation. Good diaphragmatic excursion.  CARDIAC: Currently in atrial fibrillation with controlled rate normal S1 and S2; no rubs,  or gallops, 2/6 systolic ejection murmur left sternal border, no CHF  ABDOMEN:  Abdomen soft, non-tender, BS normal, No masses or organomegaly  EXTREMETIES:  Extremities normal, no deformities, edema, clubbing or skin discoloration. Good capillary refill., No ulcers  NEURO:  Alert, oriented X 3,  Non-focal.  PULSES:  2+ radial, 2+ carotid    Visit Vitals  /73 (BP Location: Left arm, Patient Position: Lying)   Pulse 97   Temp 36.8 °C (98.2 °F) (Temporal)   Resp 17   Ht 1.524 m (5')   Wt 54.4 kg (120 lb)   SpO2 97%   BMI 23.44 kg/m²   Smoking Status Former   BSA 1.52 m²        Outpatient Follow-Up  Future Appointments   Date Time Provider Department Center   5/1/2025 11:00 [place] [id number] [place]EXA [id number] [facility]   5/14/2025 10:15 AM Segundo Vaughn, PT [id number]   7/24/2025  1:00 PM OSC[facility] Ambriz [id number] [facility]   8/25/2025 10:40 AM Handy Mike MD [id number] [facility]   1/22/2026 11:30 AM OSCAR Ambriz [id number] [facility]         James Galindo MD

## 2025-04-24 NOTE — DISCHARGE INSTRUCTIONS
You were seen by the neurology department, whom ruled out acute stroke event.  Ultimately your tramadol regimen was discontinued as this may have been the cause of your increased confusion.    You were also seen by the geriatrics department.  You were started on medications Seroquel and Zoloft.  Please take these medications as directed.    Return to ED if you experience any new fevers, chills, chest pain, shortness of breath, abdominal pain, nausea, vomiting, weakness, lightheadedness, lethargy.

## 2025-04-24 NOTE — PROGRESS NOTES
Occupational Therapy                 Therapy Communication Note    Patient Name: Ann Marie Murphy  MRN: [id number]  Department: [place] [id number]  Room: [address] [place] [address]  Today's Date: 4/24/2025     Discipline: Occupational Therapy          Missed Visit Reason: Missed Visit Reason:  (OT attempt x 2 this date for follow up treatment with pt per POC. Pt with other members of her care team and unavailable for OT session.  No treatment this date.)    Missed Time: Attempt

## 2025-04-24 NOTE — CONSULTS
"Inpatient consult to Geriatric Medicine  Consult performed by: Radha Byrd, APRN-CNP  Consult ordered by: Cara Saleh PA-C  Reason for consult: Agitation, sundowning, ?undiagnosed dementia          Primary Team: General Medicine     Admit Date: 4/19/2025    Emergency Contact:   Extended Emergency Contact Information  Primary Emergency Contact: Mary Sebastian Phone: [phone]  Mobile Phone: [phone]  Relation: Child  Secondary Emergency Contact: Micah Reddy   [place]  Mobile Phone: [phone]  Relation: Child     Reason For Consult: \"Agitation, sundowning, ?undiagnosed dementia\"    History Of Present Illness:  Ann Marie Murphy is a 89 y.o. female with history of hypertension, hyperlipidemia, previous breast carcinoma in remission on anastrozole, diverticulosis of colon and asymptomatic cholelithiasis seen on CT scan of abdomen, history of seizure disorder and chronic lower back pain for which she has been taking tramadol presents with confusion and wobbly gait for the last 5 to 7 days and forgetfulness.     Hospital course: Admitted due to intermittent confusion, unsteady gait, and hemiataxia with shaking. Neurology consulted MRI completed without acute change-did show age related volume loss, atherosclerotic disease and ischemic white matter demyelination. Keppra level at low end of normal (10) neurology recommending antipsychotic. Patient has been taking tramadol for years-per med rec patient claimed she was not taking. [place] reviewed, last fill noted on 3/21/25 for 180 tabs. Patient has not received Tramadol this admission. Started on Seroquel 25mg BID on 4/23 due to continued agitation.     History per patient:  ***    History per family/caregiver: (obtained in addition due to patient’s ***)  ***    What matters most to the patient:  ***     Prior to Admission Meds  Prior to Admission Medications   Prescriptions Last Dose Informant Patient Reported? Taking? "   Lactobacillus acidophilus capsule Not Taking  Yes No   Sig: Take 1 capsule by mouth once daily.   Patient not taking: Reported on 4/21/2025   RABEprazole (Aciphex) EC tablet Not Taking  Yes No   Sig: Take 1 tablet (20 mg) by mouth once daily.   Patient not taking: Reported on 4/21/2025   acetaminophen (Tylenol) 325 mg tablet Past Week  Yes Yes   Sig: Take 2 tablets (650 mg) by mouth every 4 hours if needed for mild pain (1 - 3) or moderate pain (4 - 6).   anastrozole (Arimidex) 1 mg tablet 4/18/2025 Morning  No No   Sig: Take 1 tablet (1 mg total) by mouth once daily.   ascorbic acid, vitamin C, 500 mg capsule 4/18/2025 Morning  Yes No   Sig: Take 1 capsule by mouth once daily.   aspirin 81 mg EC tablet 4/18/2025 Morning  Yes No   Sig: Take 1 tablet (81 mg) by mouth once daily.   atorvastatin (Lipitor) 10 mg tablet 4/18/2025 Bedtime  No No   Sig: Take 1 tablet (10 mg) by mouth once daily at bedtime.   cholecalciferol (Vitamin D-3) 50 MCG (2000 UT) tablet 4/18/2025 Morning  Yes No   Sig: Take 1 tablet (50 mcg) by mouth once daily.   famotidine (Pepcid) 20 mg tablet Not Taking  Yes No   Sig: Take 1 tablet (20 mg) by mouth once daily.   Patient not taking: Reported on 4/21/2025   fluticasone (Flonase Allergy Relief) 50 mcg/actuation nasal spray Not Taking  Yes No   Sig: Administer 2 sprays into each nostril once daily as needed for rhinitis or allergies.   Patient not taking: Reported on 4/21/2025   levETIRAcetam (Keppra) 500 mg tablet 4/18/2025 Morning  No No   Sig: TAKE 2 TABLETS BY MOUTH DAILY   levETIRAcetam XR (Keppra XR) 500 mg 24 hr tablet Not Taking  No No   Sig: TAKE 2 TABLETS(1000 MG) BY MOUTH EVERY DAY   Patient not taking: Reported on 4/21/2025   levETIRAcetam XR (Keppra XR) 500 mg 24 hr tablet Not Taking  No No   Sig: do not crush, chew, or split.TAKE 2 TABLETS(1000 MG) BY MOUTH EVERY DAY   Patient not taking: Reported on 4/21/2025   metoprolol succinate XL (Toprol-XL) 100 mg 24 hr tablet 4/18/2025  Morning  No No   Sig: TAKE 1 TABLET(100 MG) BY MOUTH EVERY DAY   midodrine (Proamatine) 10 mg tablet Not Taking  Yes No   Sig: Per instructions AS NEEDED (route: oral)   Patient not taking: Reported on 4/21/2025   mirtazapine (Remeron) 15 mg tablet Not Taking  Yes No   Sig: Take 0.5 tablets (7.5 mg) by mouth once daily at bedtime.   Patient not taking: Reported on 4/21/2025   naloxone (Narcan) 4 mg/0.1 mL nasal spray Not Taking  Yes No   Sig: Administer 1 spray (4 mg) into affected nostril(s) if needed for opioid reversal or respiratory depression.   Patient not taking: Reported on 4/21/2025   traMADol (Ultram) 50 mg tablet Not Taking  No No   Sig: Take 1-2 tablets ( mg) by mouth 3 times a day as needed for severe pain (7 - 10). Do not fill before March 22, 2025.   Patient not taking: Reported on 4/21/2025   valsartan (Diovan) 320 mg tablet 4/18/2025 Morning  No No   Sig: Take 1 tablet (320 mg) by mouth once daily.      Facility-Administered Medications: None        Current Meds in Hospital  Current Medications[1]     The patient's outpatient electronic medical record (EMR) is reviewed, notable information includes ***.      Past Medical History  Medical History[2]     Surgical History  Surgical History[3]     Family History  Her family history includes Arrhythmia in her brother; Carcinoma of the pancreas in her sister; Kidney cancer in her sister; Lung cancer in her father; Parkinson disease in her brother; Prostate cancer in her brother.     Social History  She reports that she has quit smoking. Her smoking use included cigarettes. She has never used smokeless tobacco. She reports that she does not currently use alcohol. She reports that she does not currently use drugs.  -Alcohol use: {YES wildcard/NO:60}  -Tobacco use: {YES wildcard/NO:60}  -Illicit drug use: {YES wildcard/NO:60}  -Exercise: ####  -Spiritual needs: ####  -Marital Status: ***  Occupation: ***  {Highest Level of Education:12683}  {[facility]  "Resources:34265}  : {yes,no:43176}  Current living environment: ***    Activities of Daily Living:  Basic ADLs: (I= independent, A= assistance, D= dependent)  Bathing: {Activities of Daily Livin}, Dressing: {Activities of Daily Livin}, Toileting: {Activities of Daily Livin}, Transferring: {Activities of Daily Livin}, Continence: {Activities of Daily Livin}, Feeding: {Activities of Daily Livin}    Sims Index:  ***  Instrumental ADLs: (I= independent, A= assistance, D= dependent)  Ability to use phone: {Activities of Daily Livin}, Shopping: {Activities of Daily Livin}, Cooking: {Activities of Daily Livin}, Housekeeping: {Activities of Daily Livin}, Laundry: {Activities of Daily Livin}, Transportation: {Activities of Daily Livin}, Medications: {Activities of Daily Livin}, Handle Finances: {Activities of Daily Livin}   Ovi Scale:  ***    Allergies  Adhesive tape-silicones, Cortisone, Diphenhydramine, Latex, and Morphine    Review of Systems  Review of System:  Constitutional:   -Night sweats/fever: ***     -Weight change: ***    Integumentary: ***    Ears, Nose, Throat:  -Hearing loss: ***        Eyes:  -Vision loss: ***    Cardiovascular:  -Chest Pain: ***  -Orthopnea: ***      -Paroxysmal nocturnal dyspnea: ***  -Edema: ***    Respiratory:   -Dyspnea: ***  -Wheezing: ***    Gastrointestinal:           -Appetite: ***  -Difficulty chewing/ swallowing: ***  -Heartburn: ***  -Bowels:***    Genitourinary:   -Incontinence: ***  -Nocturia: *** per night    Musculoskeletal:  -Mobility: ***  -Pain: ***    Neurological:  -Confusion: ***  -Falls: ***  -Gait: ***  -Memory: ***  -Tremor: ***    Psychiatric:  -Mood: ***  -Sleep: ***    Endocrine: ***    Hematologic/ Lymphatic: ***    Allergic/ Immunologic: ***     Documents on file and valid:  Advance Directive/Living Will: {YES OR NO - DEFAULT YES:94178::\"Yes\"}   Health Care " "Power of : {YES OR NO - DEFAULT YES:71773::\"Yes\"}  Other: ***  Code Status: Full Code    {Link to Stroke Scoring tools - Link :99}  Confusion Assessment Method (CAM)  Not on file.    [place] Cognitive Assessment (MoCA)  Not on file.    Geriatric Depression Scale (GDS)  Not on file.    Mini-Cog (Early Dementia Detection)  Not on file.    [place]stein Mini-Mental State Exam (MMSE)  Not on file.    Patient Health Questionnaire (PHQ-9)  Not on file.     Physical Exam    Last Recorded Vitals      4/23/2025     3:00 AM 4/23/2025     8:34 AM 4/23/2025     3:35 PM 4/23/2025     9:20 PM 4/24/2025    12:12 AM 4/24/2025     3:24 AM 4/24/2025     7:27 AM   Vitals   Systolic 156 153 116 126 132 150 134   Diastolic 87 92 70 70 75 70 70   BP Location Left arm Left arm Right arm Left arm Left arm Left arm Left arm   Heart Rate 59 81 85 83 81 76 88   Temp 36.6 °C (97.9 °F) 36.6 °C (97.9 °F) 36.4 °C (97.6 °F) 36.7 °C (98.1 °F) 36.2 °C (97.1 °F) 36.6 °C (97.9 °F) 37 °C (98.6 °F)   Resp 16 18 17 18 17 18 17      Vitals:    04/19/25 1110   Weight: 54.4 kg (120 lb)        Confusion Assessment Method(CAM) for diagnosis of delirium:    1.  Acute onset or fluctuating course: {DESC; PRESENT/ABSENT:07346::\"absent\"}  2.  Inattention: {DESC; PRESENT/ABSENT:54629::\"absent\"}  3.  Disorganized thinking: {DESC; PRESENT/ABSENT:39198::\"absent\"}  4.  Altered level of consciousness: {DESC; PRESENT/ABSENT:65478::\"absent\"}  CAM: {POSITIVE/NEGATIVE:94799}    AT Score For Assessment of Delirium and Cognitive Impairment:    Alertness: {Numbers 0,4:63737}  Normal(fully alert,but not agitated, throughout assessment)=0  Mild sleepiness for <10 seconds after walking, then normal=0  Clearly abnormal=4  2.  AMT4: {0-2:08250}  No mistakes=0  One mistake=1  Two or more mistakes/untestable=2  3.  Attention: {0-2:97394}  Achieves seven months or more correctly=0  Starts but scores <7 months/ refuses to start=1  Untestable(cannot start because unwell, drowsy, " inattentive)=2  4.  Acute: {Numbers 0,4:99484}  No=0  Yes=4    Total Score: {NUMBERS 0-4:34140}  4 or above: Possible delirium +/- cognitive impairment  1-3: Possible cognitive impairment  0: Delirium or severe cognitive impairment unlikely(but delirium still possible if (4) information incomplete)     Relevant Results  Lab Results   Component Value Date    TSH 0.45 04/21/2025    KCDLBBXB32 1,545 (H) 04/21/2025    VITD25 51 09/12/2022     Results from last 7 days   Lab Units 04/24/25  0606 04/23/25  0735 04/22/25  0607 04/20/25  1032 04/19/25  1134   WBC AUTO x10*3/uL 9.8 8.2 9.5   < > 8.6   HEMOGLOBIN g/dL 13.3 13.7 13.0   < > 13.6   HEMATOCRIT % 42.0 43.0 41.1   < > 41.5   ALT U/L  --   --   --   --  36   AST U/L  --   --   --   --  59*   SODIUM mmol/L 144 144 145   < > 143   POTASSIUM mmol/L 4.0 4.5 4.0   < > 3.7   CHLORIDE mmol/L 111* 114* 113*   < > 109*   CREATININE mg/dL 0.71 0.74 0.74   < > 0.87   BUN mg/dL 28* 21 22   < > 12   CO2 mmol/L 28 25 26   < > 24    < > = values in this interval not displayed.       Recent Imaging Results  XR lumbar spine 2-3 views  Result Date: 4/11/2025  Impression: Advanced lower lumbar degenerative changes with mild scoliosis.   Signed by: Jareth Hess 4/11/2025 7:01 AM Dictation workstation:   [id number]     MR brain wo IV contrast  Narrative: Interpreted By:  Zach Galvan,   STUDY:  MR BRAIN WO IV CONTRAST;  4/20/2025 4:25 pm      INDICATION:  Signs/Symptoms:Left hemiataxia and encephalopathy.          COMPARISON:  April 19, 2025      ACCESSION NUMBER(S):  [id number]      ORDERING CLINICIAN:  JACQUIE GARLAND      TECHNIQUE:  Multiplaner, multisequence MRI were obtained without contrast      FINDINGS:  Parenchyma:  There is a background of age-related volume loss and  presumed ischemic white matter demyelination.      Ventricles: There is no hydrocephalus.      Extra-axial spaces: No abnormal extra-axial fluid collection. Basal  cisterns are patent.      Vessels: T2  flow voids are maintained.      Orbits: The imaged orbits are unremarkable.      Paranasal sinuses and mastoid air cells: No fluid levels are present.      Skull: There is normal T1 marrow signal without evidence of  aggressive lesion.      Soft tissues: Unremarkable              Impression: 1. Negative brain MRI examination for acute change.  2. There is a background of age-related volume loss, atherosclerotic  disease, and ischemic white matter demyelination.      MACRO:  None      Signed by: Zach Galvan 4/20/2025 4:54 PM  Dictation workstation:   [id number]      Head/Brain Imaging  === Results for orders placed during the hospital encounter of 04/19/25 ===    MR brain wo IV contrast [[id number]] 04/20/2025    Status: Normal  1. Negative brain MRI examination for acute change.  2. There is a background of age-related volume loss, atherosclerotic  disease, and ischemic white matter demyelination.    MACRO:  None    Signed by: Zach Galvan 4/20/2025 4:54 PM  Dictation workstation:   [id number]  No results found for this or any previous visit.        DATA:  EKG: QTC  No results found for this or any previous visit (from the past 4464 hours).  Anti-psychotics in 48 hours:  Opioids/Benzodiazepines in 48 hours:  Anticholinergics on board:{YES wildcard/NO:60}  Restraints:{YES wildcard/NO:60}  Indwelling catheters:{YES wildcard/NO:60}  Last BM:  UO in 24 hours:  Activity in the past 24 hours:  Need for ambulatory devices:    Assessment/Plan   This is a/an 89 y.o. year old female, with past medical history relevant for ***, presenting for ***, being seen in geriatric consultation for ***. ***    Principal Problem:    Confusion    1. ***  Plan:   2. ***  Plan:   3. ***  Plan:     Care Transitions:  -Recommended level for discharge: ***  -Home going considerations: ***  -Primary care physician: ***    Goals of Care:  -Health care power of :  -Living will:  Code status:    4M AGE-FRIENDLY INITIATIVE:  What matters  most to patient:   Medications:   Mentation:   Mobility:       Geriatric medicine will continue to follow the patient. Thank you for allowing geriatric medicine to be involved in the care of your patient. Geriatric medicine consultation team is available during work hours Monday through Friday. For any emergency issues requiring immediate assistance over the weekend, please page Geriatrics pager [phone]    Consult Billing Time  Prep time on date of patient encounter(minutes):  Time directly with patient/family/caregiver(minutes):  Documentation time(minutes):  TOTAL TIME(minutes):    OSCAR Burgos         [1]   Current Facility-Administered Medications   Medication Dose Route Frequency Provider Last Rate Last Admin    acetaminophen (Tylenol) tablet 650 mg  650 mg oral q4h PRN James Galindo MD   650 mg at 04/21/25 2019    anastrozole (Arimidex) tablet 1 mg  1 mg oral Daily James Galindo MD   1 mg at 04/23/25 0933    aspirin EC tablet 81 mg  81 mg oral Daily James Galindo MD   81 mg at 04/23/25 0933    atorvastatin (Lipitor) tablet 10 mg  10 mg oral Nightly James Galindo MD   10 mg at 04/23/25 2128    cholecalciferol (Vitamin D-3) tablet 50 mcg  50 mcg oral Daily James Galindo MD   50 mcg at 04/23/25 0933    enoxaparin (Lovenox) syringe 40 mg  40 mg subcutaneous q24h James Galindo MD   40 mg at 04/23/25 1604    levETIRAcetam XR (Keppra XR) 24 hr tablet 500 mg  500 mg oral BID James Galindo MD   500 mg at 04/23/25 2128    metoprolol succinate XL (Toprol-XL) 24 hr tablet 100 mg  100 mg oral Daily Cara Saleh PA-C   100 mg at 04/23/25 0932    mirtazapine (Remeron) tablet 7.5 mg  7.5 mg oral Nightly James Galindo MD   7.5 mg at 04/23/25 2128    ondansetron (Zofran) tablet 4 mg  4 mg oral q8h PRN James Galindo MD        Or    ondansetron (Zofran) injection 4 mg  4 mg intravenous q8h PRN James Galindo MD        pantoprazole (ProtoNix) EC tablet 40 mg  40 mg oral Daily  before breakfast James Galindo MD   40 mg at 04/24/25 0547    polyethylene glycol (Glycolax, Miralax) packet 17 g  17 g oral Daily James Galindo MD   17 g at 04/21/25 0959    QUEtiapine (SEROquel) tablet 25 mg  25 mg oral BID Cara Saleh PA-C   25 mg at 04/23/25 3008   [2]   Past Medical History:  Diagnosis Date    Anxiety disorder due to known physiological condition     Anxiety disorder due to a general medical condition    Breast cancer     Cardiomyopathy, unspecified     Cardiomyopathy    Diverticulosis of intestine, part unspecified, without perforation or abscess without bleeding     Diverticulosis    Encounter for screening mammogram for malignant neoplasm of breast     Visit for screening mammogram    Localization-related (focal) (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, not intractable, without status epilepticus     Complex partial seizure    Other conditions influencing health status     Ovarian Torsion On The Left    Other conditions influencing health status     Stroke syndrome    Other specified noninflammatory disorders of uterus     Fibrosis of uterus    Personal history of other diseases of the circulatory system     History of hypertension    Personal history of other diseases of the circulatory system     History of hypertrophic cardiomyopathy    Personal history of other diseases of the digestive system     History of cholelithiasis    Personal history of other diseases of the digestive system     History of hemorrhoids    Personal history of other diseases of the digestive system     History of hiatal hernia    Personal history of other diseases of the digestive system     History of esophageal reflux    Personal history of other diseases of the female genital tract 01/14/2020    History of breast pain    Personal history of other endocrine, nutritional and metabolic disease     History of hypercholesterolemia    Personal history of other  endocrine, nutritional and metabolic disease 03/24/2016    History of Cushing's syndrome    Personal history of transient ischemic attack (TIA), and cerebral infarction without residual deficits     History of transient cerebral ischemia    Unspecified convulsions (Multi)     Generalized convulsive seizure   [3]   Past Surgical History:  Procedure Laterality Date    APPENDECTOMY  01/02/2014    Appendectomy    BREAST BIOPSY Right 08/05/2022    Invasive Ductal Carinoma    COLONOSCOPY  01/02/2014    Complete Colonoscopy    EXPLORATORY LAPAROTOMY  01/02/2014    Exploratory Laparotomy    HERNIA REPAIR  01/02/2014    Hernia Repair    HYSTERECTOMY  01/02/2014    Hysterectomy    OTHER SURGICAL HISTORY  01/02/2014    Upper Gastrointestinal Endoscopy (Therapeutic)    OTHER SURGICAL HISTORY  01/02/2014    Surgery    UMBILICAL HERNIA REPAIR  01/02/2014    Umbilical Hernia Repair

## 2025-04-24 NOTE — NURSING NOTE
Did get a hold of RN on the floor whom I gave report to @[phone].    Called report to [facility] at [phone]. Les, , answered and dispatched me twice to the floor nurse (@1620, 1706) who did not answer. Left our number for them to call back for report.

## 2025-04-25 LAB
ATRIAL RATE: 93 BPM
P AXIS: 23 DEGREES
P OFFSET: 201 MS
P ONSET: 146 MS
PR INTERVAL: 128 MS
Q ONSET: 210 MS
QRS COUNT: 15 BEATS
QRS DURATION: 104 MS
QT INTERVAL: 390 MS
QTC CALCULATION(BAZETT): 484 MS
QTC FREDERICIA: 451 MS
R AXIS: 2 DEGREES
T AXIS: 130 DEGREES
T OFFSET: 405 MS
VENTRICULAR RATE: 93 BPM

## 2025-04-25 NOTE — DISCHARGE SUMMARY
ADMISSION DATE: 4/19/2025      DISCHARGE DATE:  04/24/25      Discharge Diagnosis  Acute confusion and delirium  New onset dementia and behavior changes  Hypertension  Hypothyroidism  Diabetes mellitus type 2  History of seizure disorder     Issues Requiring Follow-Up  Discharge to skilled nursing facility           Discharge Meds      Your medication list          START taking these medications         Instructions Last Dose Given Next Dose Due   QUEtiapine 25 mg tablet  Commonly known as: SEROquel       Take 0.5 tablets (12.5 mg) by mouth 2 times a day.          sertraline 25 mg tablet  Commonly known as: Zoloft       Take 1 tablet (25 mg) by mouth once daily.                    CONTINUE taking these medications         Instructions Last Dose Given Next Dose Due   acetaminophen 325 mg tablet  Commonly known as: Tylenol               anastrozole 1 mg tablet  Commonly known as: Arimidex       Take 1 tablet (1 mg total) by mouth once daily.          ascorbic acid (vitamin C) 500 mg capsule               aspirin 81 mg EC tablet               atorvastatin 10 mg tablet  Commonly known as: Lipitor       Take 1 tablet (10 mg) by mouth once daily at bedtime.          cholecalciferol 50 mcg (2,000 units) tablet  Commonly known as: Vitamin D-3               levETIRAcetam 500 mg tablet  Commonly known as: Keppra       TAKE 2 TABLETS BY MOUTH DAILY          metoprolol succinate  mg 24 hr tablet  Commonly known as: Toprol-XL       TAKE 1 TABLET(100 MG) BY MOUTH EVERY DAY          valsartan 320 mg tablet  Commonly known as: Diovan       Take 1 tablet (320 mg) by mouth once daily.                    STOP taking these medications       famotidine 20 mg tablet  Commonly known as: Pepcid         Flonase Allergy Relief 50 mcg/actuation nasal spray  Generic drug: fluticasone         lactobacillus acidophilus capsule         midodrine 10 mg tablet  Commonly known as: Proamatine         naloxone 4 mg/0.1 mL nasal spray  Commonly  known as: Narcan         RABEprazole EC tablet  Commonly known as: Aciphex         Remeron 15 mg tablet  Generic drug: mirtazapine         traMADol 50 mg tablet  Commonly known as: Ultram                       Where to Get Your Medications          These medications were sent to [facility]  [address], [facility] [address]        Hours: 8 AM to 6 PM Mon-Fri, 9 AM to 1 PM Saturday Phone: [phone]   QUEtiapine 25 mg tablet  sertraline 25 mg tablet                        Results for orders placed or performed during the hospital encounter of 04/19/25 (from the past 24 hours)   CBC   Result Value Ref Range     WBC 9.8 4.4 - 11.3 x10*3/uL     nRBC 0.0 0.0 - 0.0 /100 WBCs     RBC 4.24 4.00 - 5.20 x10*6/uL     Hemoglobin 13.3 12.0 - 16.0 g/dL     Hematocrit 42.0 36.0 - 46.0 %     MCV 99 80 - 100 fL     MCH 31.4 26.0 - 34.0 pg     MCHC 31.7 (L) 32.0 - 36.0 g/dL     RDW 13.5 11.5 - 14.5 %     Platelets 196 150 - 450 x10*3/uL   Basic metabolic panel   Result Value Ref Range     Glucose 113 (H) 74 - 99 mg/dL     Sodium 144 136 - 145 mmol/L     Potassium 4.0 3.5 - 5.3 mmol/L     Chloride 111 (H) 98 - 107 mmol/L     Bicarbonate 28 21 - 32 mmol/L     Anion Gap 9 (L) 10 - 20 mmol/L     Urea Nitrogen 28 (H) 6 - 23 mg/dL     Creatinine 0.71 0.50 - 1.05 mg/dL     eGFR 82 >60 mL/min/1.73m*2     Calcium 9.9 8.6 - 10.3 mg/dL    MR brain wo IV contrast  Result Date: 4/20/2025  Interpreted By:  Zach Galvan, STUDY: MR BRAIN WO IV CONTRAST;  4/20/2025 4:25 pm   INDICATION: Signs/Symptoms:Left hemiataxia and encephalopathy.     COMPARISON: April 19, 2025   ACCESSION NUMBER(S): [id number]   ORDERING CLINICIAN: JACQUIE GARLAND   TECHNIQUE: Multiplaner, multisequence MRI were obtained without contrast   FINDINGS: Parenchyma:  There is a background of age-related volume loss and presumed ischemic white matter demyelination.   Ventricles: There is no hydrocephalus.   Extra-axial spaces: No abnormal  extra-axial fluid collection. Basal cisterns are patent.   Vessels: T2 flow voids are maintained.   Orbits: The imaged orbits are unremarkable.   Paranasal sinuses and mastoid air cells: No fluid levels are present.   Skull: There is normal T1 marrow signal without evidence of aggressive lesion.   Soft tissues: Unremarkable            1. Negative brain MRI examination for acute change. 2. There is a background of age-related volume loss, atherosclerotic disease, and ischemic white matter demyelination.   MACRO: None   Signed by: Zach Galvan 4/20/2025 4:54 PM Dictation workstation:   [id number]     CT abdomen pelvis w IV contrast  Result Date: 4/19/2025  Interpreted By:  Dieter Levine, STUDY: CT ABDOMEN PELVIS W IV CONTRAST;  4/19/2025 12:46 pm   INDICATION: Signs/Symptoms:Tenderness over suprapubic region and right lower quadrant.   COMPARISON: 02/13/2023   ACCESSION NUMBER(S): [id number]   ORDERING CLINICIAN: JJ ROWLEY   TECHNIQUE: CT of the abdomen and pelvis was performed. Contiguous axial images were obtained at 3 mm slice thickness through the abdomen and pelvis. Coronal and sagittal reconstructions at 3 mm slice thickness were performed.  75 ML of Omnipaque 350 was administered intravenously without immediate complication.   FINDINGS: LOWER CHEST: Moderate emphysema and fibrosis similar to the previous exam. The ascending aorta is mildly dilated measuring up to 4 cm in diameter, without significant atherosclerotic calcification.   ABDOMEN:   LIVER: A slightly lobulated 1.6 cm hypodensity is present at the right liver lobe, corresponding to hypodensity on the prior examination and with what appear to be several foci of nodular enhancement most consistent with a hemangioma. The hepatic parenchyma otherwise is homogeneous.The hepatic size is normal.   SPLEEN: The spleen is normal in size and homogeneous.   ADRENAL GLANDS: Bilateral adrenal glands appear normal.   KIDNEYS AND URETERS: There are several  small cortical cysts.  Additionally there are several hypodensities too small to definitively characterize, but statistically benign such as cysts. The renal cortices otherwise are maintained..   The ureters throughout their distal course are not well identified due to a paucity of intra-abdominal fat and overlying crowded bowel loops, but the tracts otherwise are unremarkable without identified ureteral dilatation or radiodense calculi.   PANCREAS: The pancreas appears unremarkable, there is no ductal dilatation or masses.   GALLBLADDER: Cholelithiasis is noted, but without wall thickening or pericholecystic fluid to indicate cholecystitis.   BILE DUCTS: There is no biliary dilatation or filling defects.     VESSELS: Moderate atherosclerotic calcification of the aorta and iliac vessels, and proximal segment of the left renal artery. This appears fairly similar to the prior exam.   PERITONEUM AND RETROPERITONEUM: No ascites or free air, no fluid collection.   There are several surgical clips at the left upper abdomen posteriorly similar to the previous exam. No significant retroperitoneal adenopathy.   No enlarged mesenteric lymph nodes.   BOWEL: Mild diverticulosis of the sigmoid and descending colon, fairly similar to the prior exam. The bowel otherwise is unremarkable without significant dilatation or mural thickening.   PELVIS:   BLADDER: The urinary bladder contour is smooth.   REPRODUCTIVE ORGANS: Status post hysterectomy.     BONE, ABDOMINAL WALL AND OTHER FINDINGS: No suspicious osseous lesions are identified.   Fat containing small left non incarcerated inguinal hernia is noted.This appears similar to the previous exam.        1.  Emphysema and fibrosis at the lung bases. Mild aneurysmal dilatation of the ascending aorta measuring 4 cm. 2. Cholelithiasis. 3. Diverticulosis. 4. Hepatic hemangioma. Nonincarcerated small left inguinal hernia. Otherwise no acute findings.   MACRO: 1. None   Signed by: Dieter  Julianna 4/19/2025 1:17 PM Dictation workstation:   [id number]     CT head wo IV contrast  Result Date: 4/19/2025  Interpreted By:  Dieter Levine, STUDY: CT HEAD WO IV CONTRAST;  4/19/2025 12:46 pm   INDICATION: Signs/Symptoms:Worsening confusion x 2 weeks, unsteady on feet.   COMPARISON: 10/24/2021   ACCESSION NUMBER(S): [id number]   ORDERING CLINICIAN: JJ ROWLEY   TECHNIQUE: Sequential trans axial images were obtained  .   FINDINGS: INTRACRANIAL:   There is moderate prominence of the cortical sulci indicating atrophy.   There is moderate ventriculomegaly, again consistent with atrophy.   Moderate decreased attenuation of the periventricular and long tracks of the white matter most consistent with gliosis from arterial disease. There is no evidence of definite subacute infarction, intracranial hemorrhage or mass.     EXTRACRANIAL: Moderate mucosal thickening of the right maxillary air cell with 1.4 cm nodularity indicating a polyp or mucous retention cyst. The calvarium is intact.        Moderate age-related degenerative change appears progressed from previous examination, but without acute findings.   Signed by: Dieter Levine 4/19/2025 1:07 PM Dictation workstation:   [id number]     XR lumbar spine 2-3 views  Result Date: 4/11/2025  Interpreted By:  Jareth Hess, STUDY: XR LUMBAR SPINE 2-3 VIEWS   INDICATION: Signs/Symptoms:lbp.   COMPARISON: None   ACCESSION NUMBER(S): [id number]   ORDERING CLINICIAN: JACQUIE CASEY   FINDINGS: Fairly advanced lumbar degenerative changes mostly L3-S1 with a mild scoliosis. No evidence of fracture or lesion.        Advanced lower lumbar degenerative changes with mild scoliosis.   Signed by: Jareth Hess 4/11/2025 7:01 AM Dictation workstation:   [id number]              Hospital Course   Confusion  89-year-old lady with history of hypertension, hyperlipidemia, previous breast carcinoma in remission on anastrozole, diverticulosis of colon and asymptomatic cholelithiasis seen on  CT scan of abdomen, history of seizure disorder and chronic lower back pain for which she has been taking tramadol presents with confusion and wobbly gait for the last 5 to 7 days and forgetfulness.  On examination she was noted to be intermittent confusion and left hemiataxia.     1.  Acute intermittent confusion could be medication related including tramadol which she has been taking 300 mg daily for many years for chronic back pain  However patient also has early dementia  She was quite agitated overnight and required Zyprexa currently she is fully awake alert.  Add Seroquel 25 mg twice daily and seek psychiatric consultation.     2.  Left hemiataxia and wobbly gait in a patient who has a history of seizure disorder,  CT of the brain was not suggestive of any acute stroke.  Patient has been started on aspirin and will continue with atorvastatin  Seen by neurology other day and MRI of the brain was ordered which was completed which revealed no evidence of acute stroke.     3.  Hypertension, blood pressures are elevated, will resume metoprolol and patient was also on valsartan which could be resumed if blood pressure persistently remain elevated.     4.  History of breast carcinoma in remission resumed anastrozole     5.  Chronic back pain, CT of the lumbar spine revealed no fractures, does have significant arthritis and degenerative disc disease, continue Tylenol for pain as needed and hold tramadol for the time being.     6.  Seizure Disorder, Continue with Keppra.     7.  History of chronic atrial fibrillation status post Watchman device.      PT OT.  Evaluated patient today.     Overall significant improvement in her condition since admission and MRI is negative for acute stroke and no bony injuries are noted.    However patient is agitated at nighttime and required Zyprexa, she has intermittent confusion, added Seroquel 25 mg twice daily, will seek psych consultation and she will need to go to skilled nursing  facility.     Patient's daughter was in the room who is updated with patient's condition.     Total time spent in examination counseling coordinating care for this patient was greater than 35 minutes           Pertinent Physical Exam At Time of Discharge  GENERAL:  Alert, no distress, cooperative, afebrile  SKIN:  Skin color, texture, turgor normal. No rashes or lesions.  OROPHARYNX:  Lips, mucosa, and tongue are normal.Teeth and gums, normal. Oropharynx normal.  NECK:  No jugulovenous distention, No carotid bruits, Carotid pulse normal contour, Supple  LUNGS:  Lungs clear to auscultation. Good diaphragmatic excursion.  CARDIAC: Currently in atrial fibrillation with controlled rate normal S1 and S2; no rubs,  or gallops, 2/6 systolic ejection murmur left sternal border, no CHF  ABDOMEN:  Abdomen soft, non-tender, BS normal, No masses or organomegaly  EXTREMETIES:  Extremities normal, no deformities, edema, clubbing or skin discoloration. Good capillary refill., No ulcers  NEURO:  Alert, oriented X 3,  Non-focal.  PULSES:  2+ radial, 2+ carotid     Visit Vitals  /73 (BP Location: Left arm, Patient Position: Lying)   Pulse 97   Temp 36.8 °C (98.2 °F) (Temporal)   Resp 17   Ht 1.524 m (5')   Wt 54.4 kg (120 lb)   SpO2 97%   BMI 23.44 kg/m²   Smoking Status Former   BSA 1.52 m²         Outpatient Follow-Up         Future Appointments   Date Time Provider Department Center   5/1/2025 11:00 AM [facility] [id number] DEXA [id number] [facility]   5/14/2025 10:15 AM Segundo Vaughn PT [facility] [place]   7/24/2025  1:00 PM COOPER Ambriz-CNP [id number] [facility]   8/25/2025 10:40 AM Handy Mike MD [id number] [place]   1/22/2026 11:30 AM COOPER Ambriz-CNP [id number] [facility]            James Galindo MD

## 2025-04-28 ASSESSMENT — ENCOUNTER SYMPTOMS
TREMORS: 0
FACIAL SWELLING: 0
SEIZURES: 0
CHEST TIGHTNESS: 0
BACK PAIN: 1
HALLUCINATIONS: 0
ABDOMINAL PAIN: 0
FEVER: 0
SHORTNESS OF BREATH: 0
DYSURIA: 0
CHILLS: 0
PALPITATIONS: 0
HEADACHES: 0
WHEEZING: 0
BLOOD IN STOOL: 0
RHINORRHEA: 0
DYSPHORIC MOOD: 1
DIARRHEA: 0
LIGHT-HEADEDNESS: 0
NAUSEA: 0
CONSTIPATION: 0
HEMATURIA: 0
SORE THROAT: 0
VOMITING: 0
FREQUENCY: 0
SLEEP DISTURBANCE: 0

## 2025-05-01 ENCOUNTER — APPOINTMENT (OUTPATIENT)
Dept: RADIOLOGY | Facility: CLINIC | Age: 89
End: 2025-05-01
Payer: MEDICARE

## 2025-05-14 ENCOUNTER — APPOINTMENT (OUTPATIENT)
Dept: PHYSICAL THERAPY | Facility: CLINIC | Age: 89
End: 2025-05-14
Payer: MEDICARE

## 2025-05-27 ENCOUNTER — APPOINTMENT (OUTPATIENT)
Dept: RADIOLOGY | Facility: CLINIC | Age: 89
End: 2025-05-27
Payer: MEDICARE

## 2025-06-19 ENCOUNTER — APPOINTMENT (OUTPATIENT)
Dept: PRIMARY CARE | Facility: CLINIC | Age: 89
End: 2025-06-19
Payer: MEDICARE

## 2025-06-23 ENCOUNTER — APPOINTMENT (OUTPATIENT)
Dept: RADIOLOGY | Facility: CLINIC | Age: 89
End: 2025-06-23
Payer: MEDICARE

## 2025-06-24 DIAGNOSIS — R41.0 CONFUSION: ICD-10-CM

## 2025-06-26 DIAGNOSIS — Z00.00 HEALTH CARE MAINTENANCE: ICD-10-CM

## 2025-06-26 DIAGNOSIS — R41.0 CONFUSION: ICD-10-CM

## 2025-06-26 DIAGNOSIS — I10 BENIGN ESSENTIAL HYPERTENSION: ICD-10-CM

## 2025-06-26 RX ORDER — SERTRALINE HYDROCHLORIDE 25 MG/1
25 TABLET, FILM COATED ORAL DAILY
Qty: 60 TABLET | Refills: 0 | Status: SHIPPED | OUTPATIENT
Start: 2025-06-26 | End: 2025-08-25

## 2025-06-26 RX ORDER — LEVETIRACETAM 500 MG/1
TABLET ORAL
Qty: 180 TABLET | Refills: 0 | Status: SHIPPED | OUTPATIENT
Start: 2025-06-26

## 2025-06-26 RX ORDER — METOPROLOL SUCCINATE 100 MG/1
100 TABLET, EXTENDED RELEASE ORAL DAILY
Qty: 90 TABLET | Refills: 1 | Status: SHIPPED | OUTPATIENT
Start: 2025-06-26

## 2025-06-26 RX ORDER — QUETIAPINE FUMARATE 25 MG/1
12.5 TABLET, FILM COATED ORAL 2 TIMES DAILY
Qty: 60 TABLET | Refills: 0 | Status: SHIPPED | OUTPATIENT
Start: 2025-06-26

## 2025-06-30 DIAGNOSIS — R41.0 CONFUSION: ICD-10-CM

## 2025-06-30 RX ORDER — SERTRALINE HYDROCHLORIDE 25 MG/1
25 TABLET, FILM COATED ORAL DAILY
Qty: 60 TABLET | Refills: 0 | Status: SHIPPED | OUTPATIENT
Start: 2025-06-30 | End: 2025-08-29

## 2025-07-23 ENCOUNTER — APPOINTMENT (OUTPATIENT)
Dept: PRIMARY CARE | Facility: CLINIC | Age: 89
End: 2025-07-23
Payer: MEDICARE

## 2025-07-23 VITALS — OXYGEN SATURATION: 95 % | HEART RATE: 73 BPM | SYSTOLIC BLOOD PRESSURE: 136 MMHG | DIASTOLIC BLOOD PRESSURE: 76 MMHG

## 2025-07-23 DIAGNOSIS — I10 BENIGN ESSENTIAL HYPERTENSION: ICD-10-CM

## 2025-07-23 DIAGNOSIS — E83.52 HYPERCALCEMIA: ICD-10-CM

## 2025-07-23 DIAGNOSIS — Z00.00 HEALTH CARE MAINTENANCE: Primary | ICD-10-CM

## 2025-07-23 DIAGNOSIS — R41.89 COGNITIVE CHANGES: ICD-10-CM

## 2025-07-23 PROCEDURE — G0439 PPPS, SUBSEQ VISIT: HCPCS | Performed by: INTERNAL MEDICINE

## 2025-07-23 PROCEDURE — 3078F DIAST BP <80 MM HG: CPT | Performed by: INTERNAL MEDICINE

## 2025-07-23 PROCEDURE — 1159F MED LIST DOCD IN RCRD: CPT | Performed by: INTERNAL MEDICINE

## 2025-07-23 PROCEDURE — 1170F FXNL STATUS ASSESSED: CPT | Performed by: INTERNAL MEDICINE

## 2025-07-23 PROCEDURE — 1160F RVW MEDS BY RX/DR IN RCRD: CPT | Performed by: INTERNAL MEDICINE

## 2025-07-23 PROCEDURE — 99213 OFFICE O/P EST LOW 20 MIN: CPT | Performed by: INTERNAL MEDICINE

## 2025-07-23 PROCEDURE — 3075F SYST BP GE 130 - 139MM HG: CPT | Performed by: INTERNAL MEDICINE

## 2025-07-23 ASSESSMENT — PATIENT HEALTH QUESTIONNAIRE - PHQ9
3. TROUBLE FALLING OR STAYING ASLEEP OR SLEEPING TOO MUCH: NOT AT ALL
8. MOVING OR SPEAKING SO SLOWLY THAT OTHER PEOPLE COULD HAVE NOTICED. OR THE OPPOSITE, BEING SO FIGETY OR RESTLESS THAT YOU HAVE BEEN MOVING AROUND A LOT MORE THAN USUAL: NOT AT ALL
1. LITTLE INTEREST OR PLEASURE IN DOING THINGS: SEVERAL DAYS
SUM OF ALL RESPONSES TO PHQ9 QUESTIONS 1 AND 2: 4
SUM OF ALL RESPONSES TO PHQ QUESTIONS 1-9: 7
6. FEELING BAD ABOUT YOURSELF - OR THAT YOU ARE A FAILURE OR HAVE LET YOURSELF OR YOUR FAMILY DOWN: SEVERAL DAYS
9. THOUGHTS THAT YOU WOULD BE BETTER OFF DEAD, OR OF HURTING YOURSELF: NOT AT ALL
2. FEELING DOWN, DEPRESSED OR HOPELESS: NEARLY EVERY DAY
7. TROUBLE CONCENTRATING ON THINGS, SUCH AS READING THE NEWSPAPER OR WATCHING TELEVISION: NOT AT ALL
5. POOR APPETITE OR OVEREATING: SEVERAL DAYS
4. FEELING TIRED OR HAVING LITTLE ENERGY: SEVERAL DAYS

## 2025-07-23 ASSESSMENT — ENCOUNTER SYMPTOMS
LOSS OF SENSATION IN FEET: 0
OCCASIONAL FEELINGS OF UNSTEADINESS: 1

## 2025-07-23 NOTE — PROGRESS NOTES
Subjective   Patient ID: Ann Marie Murphy is a 89 y.o. female who presents for Annual Exam.    HPI CPE and follow-up visit no chest pain no shortness of breath has fallen at least once since coming home from the hospital and rehab has had some Loeser mentation she is off the other medication she is eating okay still has some lower back discomfort bowels okay no dysuria    Past medical history noted and unchanged    Medications noted and unchanged    Allergies multiple see EMR    Social history no tobacco no alcohol    Family history noted and unchanged    Prevention using a walker or wheelchair at this point some prior results reviewed no longer having colonoscopy    Depression screen not depressed vital signs noted alert and oriented NCAT talkative PERRLA EOMI nares without discharge OP benign no AC nodes no JVD no thyromegaly chest clear to auscultation CV regular rate and rhythm S1 is 2 abdomen soft nontender normal active bowel sounds LS spine relatively straight and nontender spinous process slightly positive right straight leg raising extremities no clubbing cyanosis or edema normal distal pulses DTR 1+    Review of Systems    Objective   /78   Pulse 73   SpO2 95%     Physical Exam  Impression General Medical examination hypertension calcium?  Dementia  Plan discussed with daughter recent hospitalization and review check comprehensive panel advised on glucose potassium and kidney function as well as liver function check CBC advised on blood count check TSH advised on thyroid and metabolism check PTH advised on same see prior set up with neurology referral to be made then recheck in 24 hours based on above TT 50 cc 26    Review chart  (check)  Assessment/Plan

## 2025-07-24 ENCOUNTER — APPOINTMENT (OUTPATIENT)
Dept: HEMATOLOGY/ONCOLOGY | Facility: CLINIC | Age: 89
End: 2025-07-24
Payer: MEDICARE

## 2025-07-24 LAB
ALBUMIN SERPL-MCNC: 4.4 G/DL (ref 3.6–5.1)
ALP SERPL-CCNC: 75 U/L (ref 37–153)
ALT SERPL-CCNC: 15 U/L (ref 6–29)
ANION GAP SERPL CALCULATED.4IONS-SCNC: 7 MMOL/L (CALC) (ref 7–17)
AST SERPL-CCNC: 18 U/L (ref 10–35)
BILIRUB SERPL-MCNC: 0.9 MG/DL (ref 0.2–1.2)
BUN SERPL-MCNC: 18 MG/DL (ref 7–25)
CALCIUM SERPL-MCNC: 10.7 MG/DL (ref 8.6–10.4)
CHLORIDE SERPL-SCNC: 107 MMOL/L (ref 98–110)
CO2 SERPL-SCNC: 27 MMOL/L (ref 20–32)
CREAT SERPL-MCNC: 0.73 MG/DL (ref 0.6–0.95)
EGFRCR SERPLBLD CKD-EPI 2021: 79 ML/MIN/1.73M2
ERYTHROCYTE [DISTWIDTH] IN BLOOD BY AUTOMATED COUNT: 13.9 % (ref 11–15)
GLUCOSE SERPL-MCNC: 102 MG/DL (ref 65–99)
HCT VFR BLD AUTO: 44.8 % (ref 35–45)
HGB BLD-MCNC: 14.3 G/DL (ref 11.7–15.5)
MCH RBC QN AUTO: 32.4 PG (ref 27–33)
MCHC RBC AUTO-ENTMCNC: 31.9 G/DL (ref 32–36)
MCV RBC AUTO: 101.4 FL (ref 80–100)
PLATELET # BLD AUTO: 233 THOUSAND/UL (ref 140–400)
PMV BLD REES-ECKER: 9.9 FL (ref 7.5–12.5)
POTASSIUM SERPL-SCNC: 4.6 MMOL/L (ref 3.5–5.3)
PROT SERPL-MCNC: 6.7 G/DL (ref 6.1–8.1)
PTH-INTACT SERPL-MCNC: 41 PG/ML (ref 16–77)
RBC # BLD AUTO: 4.42 MILLION/UL (ref 3.8–5.1)
SODIUM SERPL-SCNC: 141 MMOL/L (ref 135–146)
TSH SERPL-ACNC: 0.92 MIU/L (ref 0.4–4.5)
WBC # BLD AUTO: 7.9 THOUSAND/UL (ref 3.8–10.8)

## 2025-07-25 DIAGNOSIS — R41.0 CONFUSION: ICD-10-CM

## 2025-07-25 RX ORDER — SERTRALINE HYDROCHLORIDE 25 MG/1
25 TABLET, FILM COATED ORAL DAILY
Qty: 60 TABLET | Refills: 0 | Status: SHIPPED | OUTPATIENT
Start: 2025-07-25 | End: 2025-09-23

## 2025-08-02 ENCOUNTER — APPOINTMENT (OUTPATIENT)
Dept: RADIOLOGY | Facility: HOSPITAL | Age: 89
DRG: 640 | End: 2025-08-02
Payer: MEDICARE

## 2025-08-02 ENCOUNTER — HOSPITAL ENCOUNTER (INPATIENT)
Facility: HOSPITAL | Age: 89
End: 2025-08-02
Attending: EMERGENCY MEDICINE
Payer: MEDICARE

## 2025-08-02 ENCOUNTER — CLINICAL SUPPORT (OUTPATIENT)
Dept: EMERGENCY MEDICINE | Facility: HOSPITAL | Age: 89
DRG: 640 | End: 2025-08-02
Payer: MEDICARE

## 2025-08-02 DIAGNOSIS — I21.4 NSTEMI (NON-ST ELEVATED MYOCARDIAL INFARCTION) (MULTI): Primary | ICD-10-CM
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
## 2025-08-02 DIAGNOSIS — L82.1 SEBORRHEIC KERATOSIS: ICD-10-CM

## 2025-08-02 DIAGNOSIS — R56.9 SEIZURE (MULTI): ICD-10-CM

## 2025-08-02 DIAGNOSIS — R55 VASOVAGAL SYNCOPE: ICD-10-CM

## 2025-08-02 LAB
ALBUMIN SERPL BCP-MCNC: 3.9 G/DL (ref 3.4–5)
ALP SERPL-CCNC: 61 U/L (ref 33–136)
ALT SERPL W P-5'-P-CCNC: 11 U/L (ref 7–45)
ANION GAP SERPL CALC-SCNC: 11 MMOL/L (ref 10–20)
APPEARANCE UR: CLEAR
AST SERPL W P-5'-P-CCNC: 25 U/L (ref 9–39)
BASOPHILS # BLD AUTO: 0.04 X10*3/UL (ref 0–0.1)
BASOPHILS NFR BLD AUTO: 0.4 %
BILIRUB SERPL-MCNC: 1.3 MG/DL (ref 0–1.2)
BILIRUB UR STRIP.AUTO-MCNC: NEGATIVE MG/DL
BNP SERPL-MCNC: 242 PG/ML (ref 0–99)
BUN SERPL-MCNC: 15 MG/DL (ref 6–23)
CALCIUM SERPL-MCNC: 10 MG/DL (ref 8.6–10.6)
CARDIAC TROPONIN I PNL SERPL HS: 150 NG/L (ref 0–34)
CARDIAC TROPONIN I PNL SERPL HS: 154 NG/L (ref 0–34)
CARDIAC TROPONIN I PNL SERPL HS: 159 NG/L (ref 0–34)
CHLORIDE SERPL-SCNC: 110 MMOL/L (ref 98–107)
CHOLEST SERPL-MCNC: 160 MG/DL (ref 0–199)
CHOLESTEROL/HDL RATIO: 2.3
CK SERPL-CCNC: 89 U/L (ref 0–215)
CO2 SERPL-SCNC: 24 MMOL/L (ref 21–32)
COLOR UR: ABNORMAL
CREAT SERPL-MCNC: 0.59 MG/DL (ref 0.5–1.05)
EGFRCR SERPLBLD CKD-EPI 2021: 86 ML/MIN/1.73M*2
EOSINOPHIL # BLD AUTO: 0.05 X10*3/UL (ref 0–0.4)
EOSINOPHIL NFR BLD AUTO: 0.4 %
ERYTHROCYTE [DISTWIDTH] IN BLOOD BY AUTOMATED COUNT: 13.8 % (ref 11.5–14.5)
EST. AVERAGE GLUCOSE BLD GHB EST-MCNC: 114 MG/DL
GLUCOSE BLD MANUAL STRIP-MCNC: 110 MG/DL (ref 74–99)
GLUCOSE SERPL-MCNC: 103 MG/DL (ref 74–99)
GLUCOSE UR STRIP.AUTO-MCNC: NORMAL MG/DL
HBA1C MFR BLD: 5.6 % (ref ?–5.7)
HCT VFR BLD AUTO: 40.7 % (ref 36–46)
HDLC SERPL-MCNC: 70 MG/DL
HGB BLD-MCNC: 13.9 G/DL (ref 12–16)
IMM GRANULOCYTES # BLD AUTO: 0.03 X10*3/UL (ref 0–0.5)
IMM GRANULOCYTES NFR BLD AUTO: 0.3 % (ref 0–0.9)
KETONES UR STRIP.AUTO-MCNC: NEGATIVE MG/DL
LDLC SERPL CALC-MCNC: 72 MG/DL
LEUKOCYTE ESTERASE UR QL STRIP.AUTO: ABNORMAL
LYMPHOCYTES # BLD AUTO: 1.93 X10*3/UL (ref 0.8–3)
LYMPHOCYTES NFR BLD AUTO: 17.2 %
MAGNESIUM SERPL-MCNC: 1.95 MG/DL (ref 1.6–2.4)
MCH RBC QN AUTO: 32 PG (ref 26–34)
MCHC RBC AUTO-ENTMCNC: 34.2 G/DL (ref 32–36)
MCV RBC AUTO: 94 FL (ref 80–100)
MONOCYTES # BLD AUTO: 0.96 X10*3/UL (ref 0.05–0.8)
MONOCYTES NFR BLD AUTO: 8.6 %
MUCOUS THREADS #/AREA URNS AUTO: NORMAL /LPF
NEUTROPHILS # BLD AUTO: 8.19 X10*3/UL (ref 1.6–5.5)
NEUTROPHILS NFR BLD AUTO: 73.1 %
NITRITE UR QL STRIP.AUTO: NEGATIVE
NON HDL CHOLESTEROL: 90 MG/DL (ref 0–149)
NRBC BLD-RTO: 0 /100 WBCS (ref 0–0)
PH UR STRIP.AUTO: 5.5 [PH]
PLATELET # BLD AUTO: 166 X10*3/UL (ref 150–450)
POTASSIUM SERPL-SCNC: 4.2 MMOL/L (ref 3.5–5.3)
PROT SERPL-MCNC: 6.3 G/DL (ref 6.4–8.2)
PROT UR STRIP.AUTO-MCNC: NEGATIVE MG/DL
RBC # BLD AUTO: 4.35 X10*6/UL (ref 4–5.2)
RBC # UR STRIP.AUTO: NEGATIVE MG/DL
RBC #/AREA URNS AUTO: NORMAL /HPF
SODIUM SERPL-SCNC: 141 MMOL/L (ref 136–145)
SP GR UR STRIP.AUTO: 1.01
TRIGL SERPL-MCNC: 88 MG/DL (ref 0–149)
UROBILINOGEN UR STRIP.AUTO-MCNC: NORMAL MG/DL
VLDL: 18 MG/DL (ref 0–40)
WBC # BLD AUTO: 11.2 X10*3/UL (ref 4.4–11.3)
WBC #/AREA URNS AUTO: NORMAL /HPF

## 2025-08-02 PROCEDURE — 70450 CT HEAD/BRAIN W/O DYE: CPT

## 2025-08-02 PROCEDURE — 84484 ASSAY OF TROPONIN QUANT: CPT

## 2025-08-02 PROCEDURE — 93010 ELECTROCARDIOGRAM REPORT: CPT | Performed by: EMERGENCY MEDICINE

## 2025-08-02 PROCEDURE — 72125 CT NECK SPINE W/O DYE: CPT

## 2025-08-02 PROCEDURE — 93005 ELECTROCARDIOGRAM TRACING: CPT

## 2025-08-02 PROCEDURE — 70486 CT MAXILLOFACIAL W/O DYE: CPT | Performed by: RADIOLOGY

## 2025-08-02 PROCEDURE — 85025 COMPLETE CBC W/AUTO DIFF WBC: CPT

## 2025-08-02 PROCEDURE — 70486 CT MAXILLOFACIAL W/O DYE: CPT

## 2025-08-02 PROCEDURE — 87086 URINE CULTURE/COLONY COUNT: CPT

## 2025-08-02 PROCEDURE — 71046 X-RAY EXAM CHEST 2 VIEWS: CPT | Performed by: STUDENT IN AN ORGANIZED HEALTH CARE EDUCATION/TRAINING PROGRAM

## 2025-08-02 PROCEDURE — 82947 ASSAY GLUCOSE BLOOD QUANT: CPT

## 2025-08-02 PROCEDURE — 2500000004 HC RX 250 GENERAL PHARMACY W/ HCPCS (ALT 636 FOR OP/ED)

## 2025-08-02 PROCEDURE — 80307 DRUG TEST PRSMV CHEM ANLYZR: CPT

## 2025-08-02 PROCEDURE — 82550 ASSAY OF CK (CPK): CPT

## 2025-08-02 PROCEDURE — 80053 COMPREHEN METABOLIC PANEL: CPT

## 2025-08-02 PROCEDURE — 76377 3D RENDER W/INTRP POSTPROCES: CPT

## 2025-08-02 PROCEDURE — 71046 X-RAY EXAM CHEST 2 VIEWS: CPT

## 2025-08-02 PROCEDURE — 70450 CT HEAD/BRAIN W/O DYE: CPT | Performed by: RADIOLOGY

## 2025-08-02 PROCEDURE — 83880 ASSAY OF NATRIURETIC PEPTIDE: CPT

## 2025-08-02 PROCEDURE — 72125 CT NECK SPINE W/O DYE: CPT | Performed by: RADIOLOGY

## 2025-08-02 PROCEDURE — 72170 X-RAY EXAM OF PELVIS: CPT

## 2025-08-02 PROCEDURE — 36415 COLL VENOUS BLD VENIPUNCTURE: CPT

## 2025-08-02 PROCEDURE — 80061 LIPID PANEL: CPT

## 2025-08-02 PROCEDURE — 83735 ASSAY OF MAGNESIUM: CPT

## 2025-08-02 PROCEDURE — 1200000002 HC GENERAL ROOM WITH TELEMETRY DAILY

## 2025-08-02 PROCEDURE — 81001 URINALYSIS AUTO W/SCOPE: CPT

## 2025-08-02 PROCEDURE — 2500000002 HC RX 250 W HCPCS SELF ADMINISTERED DRUGS (ALT 637 FOR MEDICARE OP, ALT 636 FOR OP/ED)

## 2025-08-02 PROCEDURE — 2500000001 HC RX 250 WO HCPCS SELF ADMINISTERED DRUGS (ALT 637 FOR MEDICARE OP)

## 2025-08-02 PROCEDURE — 99285 EMERGENCY DEPT VISIT HI MDM: CPT | Performed by: EMERGENCY MEDICINE

## 2025-08-02 PROCEDURE — 99285 EMERGENCY DEPT VISIT HI MDM: CPT | Mod: 25 | Performed by: EMERGENCY MEDICINE

## 2025-08-02 PROCEDURE — 83036 HEMOGLOBIN GLYCOSYLATED A1C: CPT

## 2025-08-02 PROCEDURE — 72170 X-RAY EXAM OF PELVIS: CPT | Performed by: STUDENT IN AN ORGANIZED HEALTH CARE EDUCATION/TRAINING PROGRAM

## 2025-08-02 PROCEDURE — 99223 1ST HOSP IP/OBS HIGH 75: CPT

## 2025-08-02 RX ORDER — ASPIRIN 81 MG/1
81 TABLET ORAL DAILY
Status: DISCONTINUED | OUTPATIENT
Start: 2025-08-02 | End: 2025-08-11 | Stop reason: HOSPADM

## 2025-08-02 RX ORDER — VALSARTAN 320 MG/1
320 TABLET ORAL DAILY
Status: DISCONTINUED | OUTPATIENT
Start: 2025-08-02 | End: 2025-08-11 | Stop reason: HOSPADM

## 2025-08-02 RX ORDER — QUETIAPINE FUMARATE 25 MG/1
12.5 TABLET, FILM COATED ORAL 2 TIMES DAILY
Status: DISCONTINUED | OUTPATIENT
Start: 2025-08-02 | End: 2025-08-11 | Stop reason: HOSPADM

## 2025-08-02 RX ORDER — ATORVASTATIN CALCIUM 10 MG/1
10 TABLET, FILM COATED ORAL NIGHTLY
Status: DISCONTINUED | OUTPATIENT
Start: 2025-08-02 | End: 2025-08-11 | Stop reason: HOSPADM

## 2025-08-02 RX ORDER — METOPROLOL SUCCINATE 100 MG/1
100 TABLET, EXTENDED RELEASE ORAL DAILY
Status: DISCONTINUED | OUTPATIENT
Start: 2025-08-02 | End: 2025-08-11 | Stop reason: HOSPADM

## 2025-08-02 RX ORDER — AMOXICILLIN 250 MG
2 CAPSULE ORAL 2 TIMES DAILY
Status: DISCONTINUED | OUTPATIENT
Start: 2025-08-02 | End: 2025-08-11 | Stop reason: HOSPADM

## 2025-08-02 RX ORDER — LEVETIRACETAM 500 MG/1
500 TABLET ORAL 2 TIMES DAILY
Status: DISCONTINUED | OUTPATIENT
Start: 2025-08-02 | End: 2025-08-11 | Stop reason: HOSPADM

## 2025-08-02 RX ORDER — SERTRALINE HYDROCHLORIDE 50 MG/1
25 TABLET, FILM COATED ORAL DAILY
Status: DISCONTINUED | OUTPATIENT
Start: 2025-08-02 | End: 2025-08-11 | Stop reason: HOSPADM

## 2025-08-02 RX ORDER — ANASTROZOLE 1 MG/1
1 TABLET ORAL DAILY
Status: DISCONTINUED | OUTPATIENT
Start: 2025-08-02 | End: 2025-08-11 | Stop reason: HOSPADM

## 2025-08-02 RX ORDER — ACETAMINOPHEN 325 MG/1
650 TABLET ORAL EVERY 6 HOURS PRN
Status: DISCONTINUED | OUTPATIENT
Start: 2025-08-02 | End: 2025-08-11 | Stop reason: HOSPADM

## 2025-08-02 RX ORDER — ENOXAPARIN SODIUM 100 MG/ML
40 INJECTION SUBCUTANEOUS EVERY 24 HOURS
Status: DISCONTINUED | OUTPATIENT
Start: 2025-08-02 | End: 2025-08-11 | Stop reason: HOSPADM

## 2025-08-02 RX ADMIN — LEVETIRACETAM 500 MG: 500 TABLET, FILM COATED ORAL at 20:22

## 2025-08-02 RX ADMIN — ASPIRIN 81 MG: 81 TABLET, COATED ORAL at 17:29

## 2025-08-02 RX ADMIN — QUETIAPINE FUMARATE 12.5 MG: 25 TABLET ORAL at 20:22

## 2025-08-02 RX ADMIN — ENOXAPARIN SODIUM 40 MG: 100 INJECTION SUBCUTANEOUS at 20:22

## 2025-08-02 RX ADMIN — ACETAMINOPHEN 650 MG: 325 TABLET ORAL at 17:29

## 2025-08-02 RX ADMIN — VALSARTAN 320 MG: 160 TABLET, FILM COATED ORAL at 17:29

## 2025-08-02 RX ADMIN — ATORVASTATIN CALCIUM 10 MG: 20 TABLET, FILM COATED ORAL at 20:22

## 2025-08-02 RX ADMIN — SENNOSIDES AND DOCUSATE SODIUM 2 TABLET: 50; 8.6 TABLET ORAL at 20:22

## 2025-08-02 RX ADMIN — SERTRALINE HYDROCHLORIDE 25 MG: 25 TABLET ORAL at 17:29

## 2025-08-02 RX ADMIN — METOPROLOL SUCCINATE 100 MG: 100 TABLET, EXTENDED RELEASE ORAL at 17:29

## 2025-08-02 ASSESSMENT — LIFESTYLE VARIABLES
EVER HAD A DRINK FIRST THING IN THE MORNING TO STEADY YOUR NERVES TO GET RID OF A HANGOVER: NO
TOTAL SCORE: 0
HAVE YOU EVER FELT YOU SHOULD CUT DOWN ON YOUR DRINKING: NO
HAVE PEOPLE ANNOYED YOU BY CRITICIZING YOUR DRINKING: NO
EVER FELT BAD OR GUILTY ABOUT YOUR DRINKING: NO

## 2025-08-02 ASSESSMENT — HEART SCORE
TROPONIN: GREATER THAN OR EQUAL TO 3 TIMES NORMAL LIMIT
HEART SCORE: 6
ECG: NON-SPECIFIC REPOLARIZATION DISTURBANCE
AGE: 65+
HISTORY: SLIGHTLY SUSPICIOUS
RISK FACTORS: 1-2 RISK FACTORS

## 2025-08-02 ASSESSMENT — PAIN SCALES - GENERAL
PAINLEVEL_OUTOF10: 7
PAINLEVEL_OUTOF10: 0 - NO PAIN

## 2025-08-02 ASSESSMENT — PAIN DESCRIPTION - LOCATION: LOCATION: BACK

## 2025-08-02 ASSESSMENT — PAIN - FUNCTIONAL ASSESSMENT: PAIN_FUNCTIONAL_ASSESSMENT: 0-10

## 2025-08-02 ASSESSMENT — PAIN DESCRIPTION - PAIN TYPE: TYPE: CHRONIC PAIN

## 2025-08-02 NOTE — ED PROVIDER NOTES
Emergency Department Provider Note       History of Present Illness     History provided by: Patient  Limitations to History: Dementia  External Records Reviewed with Brief Summary: None    HPI:  Ann Marie Murphy is a 89 y.o. female with history of dementia, hypertension, HLD, seizure disorder BIBEMS after a reported fall last night at an unknown time.  Patient was found this morning around 10 AM with dried blood on the ground as well as dried blood in her right nares.  Patient reports that she was walking from the bathroom with her walker and she got her leg caught on a mirror that was in the hallway, causing her to fall on her face. Patient is not on blood thinners and she denies LOC, nausea, vomiting, dizziness, headache, chest pain, or shortness of breath.    Physical Exam   Triage vitals:  T 36.6 °C (97.9 °F)  HR 83  /86  RR 17  O2 97 % None (Room air)    General: Awake, alert, in no acute distress  Eyes: Gaze conjugate.  No scleral icterus or injection  HENT: Normo-cephalic, no raccoon eyes, no Dorsey sign, no hemotympanum, no CSF leak, nose edematous with dried red blood in right nares, tenderness to palpation along nose, no crepitus, no septal hematoma present, no stridor  CV: Regular rate, regular rhythm. Radial pulses 2+ bilaterally  Resp: Breathing non-labored, speaking in full sentences.  Clear to auscultation bilaterally  GI: Soft, non-distended, non-tender. No rebound or guarding.  MSK/Extremities: No gross bony deformities. Moving all extremities  Skin: Warm. Appropriate color  Neuro: Alert. Oriented. Face symmetric. Speech is fluent. CN II-XII intact.   Psych: Appropriate mood and affect      Medical Decision Making & ED Course   Medical Decision Makin y.o. female with history of dementia, hypertension, HLD, seizure disorder brought in via EMS after reported fall last night with head strike. Fall was unwitnessed, believed to be mechanical based on patient's description of the  event. No significant trauma seen on imaging. Labs remarkable for troponin of 150 and subsequent troponin was 158. EKG shows no acute ischemic findings raising concern for NSTEMI with HEART score of 6.  Patient will need admission for further management.   ----  HEART Score: 6      Social Determinants of Health which Significantly Impact Care:     EKG Independent Interpretation: The EKG obtained at 11:13 was independently interpreted by myself. It demonstrates sinus rhythm with a ventricular rate of 84 bpm.  Left axis. Intervals QT slightly prolonged. ST segments showed no acute ischemic findings.     Independent Result Review and Interpretation: Relevant laboratory and radiographic results were reviewed and independently interpreted by myself.  As necessary, they are commented on in the ED Course.    Chronic conditions affecting the patient's care: As documented above in [facility]    The patient was discussed with the following consultants/services: Admission Coordinator who accepted the patient for admission    Care Considerations: As documented above in [facility]    ED Course:  ED Course as of 08/02/25 1530   Sat Aug 02, 2025   1209 CBC and Auto Differential(!)  Unremarkable [MR]   1216 EKG independently interpreted by myself: NSR, HR 84, normal axis, normal intervals, T inversions in lateral leads, LVH, no ST elevations, no significant changes from prior [FF]   1227 Comprehensive metabolic panel(!)  Unremarkable [MR]   1228 Creatine Kinase  Unremarkable [MR]   1400 XR pelvis 1-2 views  No acute fracture [MR]   1410 XR chest 2 views  Mild bibasilar interstitial prominence.  No signs of significant trauma. [MR]   1458 ED ATTENDING ATTESTATION:    89-year-old female presents after fall.  Reportedly thinks she tripped in a long hallway outside of her apartment and had a mechanical fall.  Fell forward and hit her face.  Most reports pain over her nose.  Denies pain elsewhere.  Good range of motion of her leg without any pain at  the hip.  No chest or abdominal pain.  No spinal tenderness.  CT head and face without any acute injury.  Likely just bruising.  Labs showed elevated troponin of 150, no chest pain.  No EKG changes.  Other labs unremarkable.  Plan for repeat troponin and likely admission for trending of troponin. [FF]      ED Course User Index  [FF] Jarod Zamora MD  [MR] Arvin Recinos DO         Diagnoses as of 08/02/25 1530   Fall, initial encounter       Disposition   As a result of their workup, the patient will require admission to the hospital.  The patient was informed of her diagnosis.  The patient was given the opportunity to ask questions and I answered them. The patient agreed to be admitted to the hospital.    Procedures   Procedures    Patient seen and discussed with ED attending physician.    Arvin Recnios DO  Emergency Medicine                                                       Arvin Recinos DO  Resident  08/02/25 2115

## 2025-08-02 NOTE — H&P
ADMISSION H&P     Subjective   HPI:  Ann Marie Murphy is a 89 y.o. female with h/o dementia, hypertension, HLD, seizure disorder, and breast cancer in remission who presents after an unwitnessed fall last night. She was brought in by EMS. Patient reports she was getting ready for bed last night and was going to the restroom when she tripped/had a misstep while using her walker and fell face forward. She then called out to her cousin, who is also her neighbor to help her off the floor however, she was not helped until the next morning. Patient lives alone in an apartment and her daughter helps her with ADL/iADLs. She denied chest pain, palpation, shortness of breath, lightheadedness, dizziness, LOC, or, syncope.     Patient was recently hospitalized in April for acute confusion and delirium where she was discharged to a SNF and eventually home after ~4 weeks at the nursing facility.     ROS: 10-point ROS negative unless otherwise mentioned in HPI    ED Course:  Vitals:   T 36.6 °C (97.9 °F)  HR 83  /86  RR 17  O2 97 % None (Room air)  CBC: WBC 11.2 , HGB 13.9,   BMP: , K 4.2, Cl 110, Mg 1.95, HCO3 24, BUN 15, CR 0.59 Glu 103  LFTS: AST 25 , ALT 11, ALK PHOS 61 , TBILI 1.3  TROP: 150 -> 159  CK: 89  BNP: 242  UA: +LE pending Ucx     Relevant studies:  CXR: Mild bibasilar interstitial prominence. Correlate with volume status.    XR pelvis: Mild bilateral hip osteoarthrosis. No acute fracture or dislocation.     CTH, cervical spine, and maxillofacial bones w/o con: No acute intracranial abnormality. Moderate microangiopathic disease and mild diffuse parenchymal volume loss. No acute maxillofacial fracture. No acute traumatic injury of the cervical spine.    EKG: NSR, no ST elevations    Interventions:  none    Medical History:  Problem List[1]   Medical History[2]  Surgical History[3]  Current Outpatient Medications   Medication Instructions    acetaminophen (Tylenol) 325 mg tablet Take 2  tablets (650 mg) by mouth every 4 hours if needed for mild pain (1 - 3) or moderate pain (4 - 6).    anastrozole (ARIMIDEX) 1 mg, oral, Daily    ascorbic acid, vitamin C, 500 mg capsule 1 capsule, Daily    aspirin 81 mg EC tablet 1 tablet, Daily    atorvastatin (LIPITOR) 10 mg, oral, Nightly    cholecalciferol (Vitamin D-3) 50 MCG (2000 UT) tablet 1 tablet, Daily    levETIRAcetam (Keppra) 500 mg tablet TAKE 2 TABLETS BY MOUTH DAILY    metoprolol succinate XL (TOPROL-XL) 100 mg, oral, Daily    QUEtiapine (SEROQUEL) 12.5 mg, oral, 2 times daily    sertraline (ZOLOFT) 25 mg, oral, Daily    sertraline (ZOLOFT) 25 mg, oral, Daily    valsartan (DIOVAN) 320 mg, oral, Daily      RX Allergies[4]   Social History[5]  Family History[6]          Objective   Vitals: reviewed  Exam:  Gen: well-appearing, NAD, hard of hearing   Head and neck: NCAT, neck supple without LAD  HEENT: MMM, bridge of nose slightly edematous with mild tenderness and edematous upper lip w/o laceration or abrasion  CV: RRR no MRG, radial pulses 2+  Pulm: CTAB, no wheezing or crackles, normal WOB on RA  Abd: soft, non-tender, non-distended  Ext: WWP, no LE edema  Skin: bilateral lower extremity bruises at different stages of healing   Neuro: alert and oriented x3, normal tone, face symmetric, moves all extremities spontaneously    Scheduled medications  Scheduled Medications[7]  Continuous medications  Continuous Medications[8]  PRN medications  PRN Medications[9]    Results for orders placed or performed during the hospital encounter of 08/02/25 (from the past 24 hours)   POCT GLUCOSE   Result Value Ref Range    POCT Glucose 110 (H) 74 - 99 mg/dL   CBC and Auto Differential   Result Value Ref Range    WBC 11.2 4.4 - 11.3 x10*3/uL    nRBC 0.0 0.0 - 0.0 /100 WBCs    RBC 4.35 4.00 - 5.20 x10*6/uL    Hemoglobin 13.9 12.0 - 16.0 g/dL    Hematocrit 40.7 36.0 - 46.0 %    MCV 94 80 - 100 fL    MCH 32.0 26.0 - 34.0 pg    MCHC 34.2 32.0 - 36.0 g/dL    RDW 13.8 11.5  - 14.5 %    Platelets 166 150 - 450 x10*3/uL    Neutrophils % 73.1 40.0 - 80.0 %    Immature Granulocytes %, Automated 0.3 0.0 - 0.9 %    Lymphocytes % 17.2 13.0 - 44.0 %    Monocytes % 8.6 2.0 - 10.0 %    Eosinophils % 0.4 0.0 - 6.0 %    Basophils % 0.4 0.0 - 2.0 %    Neutrophils Absolute 8.19 (H) 1.60 - 5.50 x10*3/uL    Immature Granulocytes Absolute, Automated 0.03 0.00 - 0.50 x10*3/uL    Lymphocytes Absolute 1.93 0.80 - 3.00 x10*3/uL    Monocytes Absolute 0.96 (H) 0.05 - 0.80 x10*3/uL    Eosinophils Absolute 0.05 0.00 - 0.40 x10*3/uL    Basophils Absolute 0.04 0.00 - 0.10 x10*3/uL   Comprehensive metabolic panel   Result Value Ref Range    Glucose 103 (H) 74 - 99 mg/dL    Sodium 141 136 - 145 mmol/L    Potassium 4.2 3.5 - 5.3 mmol/L    Chloride 110 (H) 98 - 107 mmol/L    Bicarbonate 24 21 - 32 mmol/L    Anion Gap 11 10 - 20 mmol/L    Urea Nitrogen 15 6 - 23 mg/dL    Creatinine 0.59 0.50 - 1.05 mg/dL    eGFR 86 >60 mL/min/1.73m*2    Calcium 10.0 8.6 - 10.6 mg/dL    Albumin 3.9 3.4 - 5.0 g/dL    Alkaline Phosphatase 61 33 - 136 U/L    Total Protein 6.3 (L) 6.4 - 8.2 g/dL    AST 25 9 - 39 U/L    Bilirubin, Total 1.3 (H) 0.0 - 1.2 mg/dL    ALT 11 7 - 45 U/L   Magnesium   Result Value Ref Range    Magnesium 1.95 1.60 - 2.40 mg/dL   Creatine Kinase   Result Value Ref Range    Creatine Kinase 89 0 - 215 U/L   Troponin I, High Sensitivity, Initial   Result Value Ref Range    Troponin I, High Sensitivity (CMC) 150 (HH) 0 - 34 ng/L   B-type natriuretic peptide   Result Value Ref Range     (H) 0 - 99 pg/mL   Troponin, High Sensitivity, 1 Hour   Result Value Ref Range    Troponin I, High Sensitivity (CMC) 159 (HH) 0 - 34 ng/L   Urinalysis with Reflex Culture and Microscopic   Result Value Ref Range    Color, Urine Light-Yellow Light-Yellow, Yellow, Dark-Yellow    Appearance, Urine Clear Clear    Specific Gravity, Urine 1.015 1.005 - 1.035    pH, Urine 5.5 5.0, 5.5, 6.0, 6.5, 7.0, 7.5, 8.0    Protein, Urine NEGATIVE  NEGATIVE, 10 (TRACE), 20 (TRACE) mg/dL    Glucose, Urine Normal Normal mg/dL    Blood, Urine NEGATIVE NEGATIVE mg/dL    Ketones, Urine NEGATIVE NEGATIVE mg/dL    Bilirubin, Urine NEGATIVE NEGATIVE mg/dL    Urobilinogen, Urine Normal Normal mg/dL    Nitrite, Urine NEGATIVE NEGATIVE    Leukocyte Esterase, Urine 75 Rosa/uL (A) NEGATIVE   Microscopic Only, Urine   Result Value Ref Range    WBC, Urine 1-5 1-5, NONE /HPF    RBC, Urine NONE NONE, 1-2, 3-5 /HPF    Mucus, Urine FEW Reference range not established. /LPF     Imaging  CT head wo IV contrast  Result Date: 8/2/2025  No acute intracranial abnormality. Moderate microangiopathic disease and mild diffuse parenchymal volume loss.   No acute maxillofacial fracture.   No acute traumatic injury of the cervical spine.   Signed by: Yenni Novoa 8/2/2025 2:35 PM Dictation workstation:   [id number]    CT cervical spine wo IV contrast  Result Date: 8/2/2025  No acute intracranial abnormality. Moderate microangiopathic disease and mild diffuse parenchymal volume loss.   No acute maxillofacial fracture.   No acute traumatic injury of the cervical spine.   Signed by: Yenni Novoa 8/2/2025 2:35 PM Dictation workstation:   [id number]    CT maxillofacial bones wo IV contrast  Result Date: 8/2/2025  No acute intracranial abnormality. Moderate microangiopathic disease and mild diffuse parenchymal volume loss.   No acute maxillofacial fracture.   No acute traumatic injury of the cervical spine.   Signed by: Yenni Novoa 8/2/2025 2:35 PM Dictation workstation:   [id number]    XR chest 2 views  Result Date: 8/2/2025  1.  Mild bibasilar interstitial prominence. Correlate with volume status.     MACRO: None   Signed by: Bud Valiente 8/2/2025 1:59 PM Dictation workstation:   [id number]    XR pelvis 1-2 views  Result Date: 8/2/2025  Mild bilateral hip osteoarthrosis. No acute fracture or dislocation.   MACRO: None   Signed by: Bud Valiente 8/2/2025 1:58 PM Dictation workstation:    [id number]      Cardiology, Vascular, and Other Imaging  No other imaging results found for the past 7 days      Assessment/Plan   89 y.o. female with h/o dementia, hypertension, HLD, seizure disorder who presents after an unwitnessed fall last night. Patient was admitted for further workup I/s/o elevated troponins to 159 and a Heart score of 6. Presentation less likely ACS given patient denied any CP, SOB and no syncopal event. Elevated trops and normal EKG findings c/w type 2 MI 2/2 ground level fall, less likely related to HF given no signs of HF on exam and low  compared to her levels 3 years prior. Will monitor patient on Tele to r/o arrhythmias     Plan:  #NSTEMI likely Type 2   ::EKG NSR w/o ST elevation   ::Heart score of 6   ::CXR showed mild bibasilar interstitial prominence, low cf acute HR   Plan  - Trops 150 -> 159 -> will continue to trend   - TTE pending to evaluate for wall motion abnormalities and structural heart dz  - Lipid panel, hemoglobin A1c pending   - C/h lipitor 10 mg and ASA 81 mg daily   - C/h metop succinate 100 mg daily     #Mechanical GLF, unwitnessed   ::Imaging unremarkable including CTH, cervical spine, maxillofacial bones w/o con, and XR pelvis   Plan  - PT/OT evaluation for possible placement   - Tylenol prn for pain     #Chronic conditions  - HTN: c/h valsartan 320 mg daily   - Dementia: c/h sertraline 25 mg and seroquel 12.5 mg daily   - Seizure disorder: c/h keppra 500 mg BID   - H/o breast carcinoma in remission: c/h anastrozole     Fluids: prn  Diet: RD  O2: prn  DVT ppx: lovenox  GI ppx: n/a  Abx: n/a  CODE STATUS: FULL (confirmed with patient on admission)  NOK: [phone] (Mary Sebastian, daughter)    HAILEY Perry MD          [1]   Patient Active Problem List  Diagnosis    Hypertrophic cardiomyopathy (Multi)    Hypertension    Hypercalcemia    Excessive cerumen in ear canal, right    Cerumen impaction    Convulsions (Multi)    Combined  hyperlipidemia    Breast mass, right    Breast cancer, right    Atherosclerosis of aorta    Acute UTI    Abnormal finding on breast imaging    Abnormal urinalysis    Anemia    Osteoporosis    Osteoarthritis of lumbar spine    LVH (left ventricular hypertrophy)    Lumbar radiculopathy    Pain of finger of left hand    Right hand paresthesia    Urinary retention    Dementia in other diseases classified elsewhere, unspecified severity, without behavioral disturbance, psychotic disturbance, mood disturbance, and anxiety (Multi)    Acute respiratory failure with hypoxia    Cushing's syndrome, unspecified    COVID-19    History of falling    Long term (current) use of aspirin    Personal history of nicotine dependence    Crush syndrome    Anxiety disorder, unspecified    Diverticulosis of intestine, part unspecified, without perforation or abscess without bleeding    Gastro-esophageal reflux disease without esophagitis    Heart failure, unspecified    History of CVA (cerebrovascular accident) without residual deficits    History of rheumatic fever    Localization-related (focal) (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, not intractable, without status epilepticus    Obstructive and reflux uropathy, unspecified    Pneumonia due to coronavirus disease 2019    Hyperlipidemia, unspecified    Essential (primary) hypertension    Encounter for monitoring aromatase inhibitor therapy    Oncology follow-up encounter    NSTEMI (non-ST elevated myocardial infarction) (Multi)   [2]   Past Medical History:  Diagnosis Date    Anxiety disorder due to known physiological condition     Anxiety disorder due to a general medical condition    Breast cancer     Cardiomyopathy, unspecified     Cardiomyopathy    Diverticulosis of intestine, part unspecified, without perforation or abscess without bleeding     Diverticulosis    Encounter for screening mammogram for malignant neoplasm of breast     Visit  for screening mammogram    Localization-related (focal) (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, not intractable, without status epilepticus     Complex partial seizure    Other conditions influencing health status     Ovarian Torsion On The Left    Other conditions influencing health status     Stroke syndrome    Other specified noninflammatory disorders of uterus     Fibrosis of uterus    Personal history of other diseases of the circulatory system     History of hypertension    Personal history of other diseases of the circulatory system     History of hypertrophic cardiomyopathy    Personal history of other diseases of the digestive system     History of cholelithiasis    Personal history of other diseases of the digestive system     History of hemorrhoids    Personal history of other diseases of the digestive system     History of hiatal hernia    Personal history of other diseases of the digestive system     History of esophageal reflux    Personal history of other diseases of the female genital tract 01/14/2020    History of breast pain    Personal history of other endocrine, nutritional and metabolic disease     History of hypercholesterolemia    Personal history of other endocrine, nutritional and metabolic disease 03/24/2016    History of Cushing's syndrome    Personal history of transient ischemic attack (TIA), and cerebral infarction without residual deficits     History of transient cerebral ischemia    Unspecified convulsions (Multi)     Generalized convulsive seizure   [3]   Past Surgical History:  Procedure Laterality Date    APPENDECTOMY  01/02/2014    Appendectomy    BREAST BIOPSY Right 08/05/2022    Invasive Ductal Carinoma    COLONOSCOPY  01/02/2014    Complete Colonoscopy    EXPLORATORY LAPAROTOMY  01/02/2014    Exploratory Laparotomy    HERNIA REPAIR  01/02/2014    Hernia Repair    HYSTERECTOMY  01/02/2014    Hysterectomy    OTHER SURGICAL HISTORY   "2014    Upper Gastrointestinal Endoscopy (Therapeutic)    OTHER SURGICAL HISTORY  2014    Surgery    UMBILICAL HERNIA REPAIR  2014    Umbilical Hernia Repair   [4]   Allergies  Allergen Reactions    Adhesive Tape-Silicones Unknown    Cortisone Swelling    Diphenhydramine Hallucinations and Other     \"goes out of head\"    Latex Unknown    Morphine Unknown   [5]   Social History  Tobacco Use    Smoking status: Former     Types: Cigarettes    Smokeless tobacco: Never   Substance Use Topics    Alcohol use: Not Currently    Drug use: Not Currently   [6]   Family History  Problem Relation Name Age of Onset    Lung cancer Father      Other (Carcinoma of the pancreas) Sister      Kidney cancer Sister      Arrhythmia Brother          ALl 3 Brothers     Other (Parkinson disease) Brother      Prostate cancer Brother     [7] anastrozole, 1 mg, oral, Daily  aspirin, 81 mg, oral, Daily  atorvastatin, 10 mg, oral, Nightly  enoxaparin, 40 mg, subcutaneous, q24h  levETIRAcetam, 500 mg, oral, BID  metoprolol succinate XL, 100 mg, oral, Daily  QUEtiapine, 12.5 mg, oral, BID  sennosides-docusate sodium, 2 tablet, oral, BID  sertraline, 25 mg, oral, Daily  valsartan, 320 mg, oral, Daily     [8]    [9] PRN medications: acetaminophen    "

## 2025-08-02 NOTE — ED TRIAGE NOTES
Pt BIB [facility] for Fall last night at home. Pt was found down this morning by daughter ([phone]). Pt is A+Ox4, per daughter she may have early onset dementia. Pt states she fell face first and unable to get up. + head strike, -LOC, - thinners

## 2025-08-03 VITALS
SYSTOLIC BLOOD PRESSURE: 113 MMHG | WEIGHT: 120 LBS | RESPIRATION RATE: 19 BRPM | DIASTOLIC BLOOD PRESSURE: 73 MMHG | HEIGHT: 60 IN | OXYGEN SATURATION: 98 % | BODY MASS INDEX: 23.56 KG/M2 | TEMPERATURE: 98 F | HEART RATE: 76 BPM

## 2025-08-03 LAB
AMPHETAMINES UR QL SCN: NORMAL
BARBITURATES UR QL SCN: NORMAL
BENZODIAZ UR QL SCN: NORMAL
BZE UR QL SCN: NORMAL
CANNABINOIDS UR QL SCN: NORMAL
FENTANYL+NORFENTANYL UR QL SCN: NORMAL
METHADONE UR QL SCN: NORMAL
OPIATES UR QL SCN: NORMAL
OXYCODONE+OXYMORPHONE UR QL SCN: NORMAL
PCP UR QL SCN: NORMAL

## 2025-08-03 PROCEDURE — 2500000004 HC RX 250 GENERAL PHARMACY W/ HCPCS (ALT 636 FOR OP/ED)

## 2025-08-03 PROCEDURE — 99233 SBSQ HOSP IP/OBS HIGH 50: CPT

## 2025-08-03 PROCEDURE — 2500000001 HC RX 250 WO HCPCS SELF ADMINISTERED DRUGS (ALT 637 FOR MEDICARE OP)

## 2025-08-03 PROCEDURE — 2500000002 HC RX 250 W HCPCS SELF ADMINISTERED DRUGS (ALT 637 FOR MEDICARE OP, ALT 636 FOR OP/ED)

## 2025-08-03 PROCEDURE — 1200000002 HC GENERAL ROOM WITH TELEMETRY DAILY

## 2025-08-03 RX ORDER — CEFTRIAXONE 1 G/50ML
1 INJECTION, SOLUTION INTRAVENOUS EVERY 24 HOURS
Status: DISCONTINUED | OUTPATIENT
Start: 2025-08-03 | End: 2025-08-06

## 2025-08-03 RX ADMIN — ANASTROZOLE 1 MG: 1 TABLET ORAL at 11:08

## 2025-08-03 RX ADMIN — SENNOSIDES AND DOCUSATE SODIUM 2 TABLET: 50; 8.6 TABLET ORAL at 20:37

## 2025-08-03 RX ADMIN — QUETIAPINE FUMARATE 12.5 MG: 25 TABLET ORAL at 11:00

## 2025-08-03 RX ADMIN — ASPIRIN 81 MG: 81 TABLET, COATED ORAL at 11:00

## 2025-08-03 RX ADMIN — ATORVASTATIN CALCIUM 10 MG: 20 TABLET, FILM COATED ORAL at 20:38

## 2025-08-03 RX ADMIN — METOPROLOL SUCCINATE 100 MG: 100 TABLET, EXTENDED RELEASE ORAL at 11:05

## 2025-08-03 RX ADMIN — VALSARTAN 320 MG: 160 TABLET, FILM COATED ORAL at 11:01

## 2025-08-03 RX ADMIN — CEFTRIAXONE 1 G: 1 INJECTION, SOLUTION INTRAVENOUS at 14:13

## 2025-08-03 RX ADMIN — SERTRALINE HYDROCHLORIDE 25 MG: 25 TABLET ORAL at 11:00

## 2025-08-03 RX ADMIN — QUETIAPINE FUMARATE 12.5 MG: 25 TABLET ORAL at 20:37

## 2025-08-03 RX ADMIN — ACETAMINOPHEN 650 MG: 325 TABLET ORAL at 11:00

## 2025-08-03 RX ADMIN — LEVETIRACETAM 500 MG: 500 TABLET, FILM COATED ORAL at 20:37

## 2025-08-03 RX ADMIN — LEVETIRACETAM 500 MG: 500 TABLET, FILM COATED ORAL at 11:00

## 2025-08-03 ASSESSMENT — PAIN - FUNCTIONAL ASSESSMENT: PAIN_FUNCTIONAL_ASSESSMENT: 0-10

## 2025-08-03 ASSESSMENT — PATIENT HEALTH QUESTIONNAIRE - PHQ9
2. FEELING DOWN, DEPRESSED OR HOPELESS: NOT AT ALL
SUM OF ALL RESPONSES TO PHQ9 QUESTIONS 1 AND 2: 0
1. LITTLE INTEREST OR PLEASURE IN DOING THINGS: NOT AT ALL

## 2025-08-03 ASSESSMENT — PAIN SCALES - GENERAL
PAINLEVEL_OUTOF10: 0 - NO PAIN
PAINLEVEL_OUTOF10: 4

## 2025-08-03 ASSESSMENT — ACTIVITIES OF DAILY LIVING (ADL)
MANAGING_FINANCES: NEEDS ASSISTANCE
TAKING_MEDICATION: NEEDS ASSISTANCE
BATHING: INDEPENDENT
DOING_HOUSEWORK: NEEDS ASSISTANCE
GROCERY_SHOPPING: NEEDS ASSISTANCE
DRESSING: INDEPENDENT

## 2025-08-03 ASSESSMENT — ENCOUNTER SYMPTOMS: DEPRESSION: 0

## 2025-08-03 NOTE — PROGRESS NOTES
Ann Marie Murphy is a 89 y.o. female on day 1 of admission presenting with NSTEMI (non-ST elevated myocardial infarction) (Multi).      Subjective   No acute overnight events. Patient reported that she has been having increasing urinary frequency/urgency. Patient otherwise denied any other complaints. Patient reported fall at home as mentioned in H&P as well as possible LOC.     Objective     Last Recorded Vitals  /72   Pulse 77   Temp 36.7 °C (98 °F) (Oral)   Resp 16   Wt 54.4 kg (120 lb)   SpO2 96%   Intake/Output last 3 Shifts:  No intake or output data in the 24 hours ending 08/03/25 1531    Admission Weight  Weight: 54.4 kg (120 lb) (08/02/25 1126)    Daily Weight  08/02/25 : 54.4 kg (120 lb)    Image Results  CT head wo IV contrast, CT cervical spine wo IV contrast, CT maxillofacial bones wo IV contrast  Narrative: Interpreted By:  Yenni Novoa,   STUDY:  CT CERVICAL SPINE WO IV CONTRAST; CT FACIAL BONES WO IV CONTRAST; CT  HEAD WO IV CONTRAST      INDICATION:  Signs/Symptoms:Fall      COMPARISON:  Brain MRI 04/2025. CT head 04/19/2025.      ACCESSION NUMBER(S):  [id number]; [id number]; [id number]      ORDERING CLINICIAN:  ASHER CUBA      TECHNIQUE:  CT of the head, face and cervical spine was performed without the  administration of intravenous contrast.      FINDINGS:  CT HEAD:      No acute infarction, intracranial hemorrhage or mass lesion. Mild  microangiopathic disease and moderate diffuse parenchymal volume loss.      No hydrocephalus.  No extra-axial fluid collections.      CT FACE:      SKIN AND SUBCUTANEOUS SOFT TISSUES: No asymmetrical soft tissue  swelling or hematoma.      OSSEOUS STRUCTURES: No fracture, dislocation or destructive lesion.      ORBITS: The globes, retrobulbar fat, extraocular muscles, and optic  nerve sheath complexes are intact and normal in morphology.      PARANASAL SINUSES: Moderate mucoperiosteal thickening of the right  maxillary sinus. Small mucous  Take prescribed antibiotic and ear drops as prescribed  Avoid swimming for one week  Tylenol or motrin as needed for pain     retention cysts in the right sphenoid  sinus.      MASTOID AIR CELLS: Clear.      CT CERVICAL SPINE:      ALIGNMENT: Reversal of usual cervical lordosis. Mild anterolisthesis  of C3 on C4 and C4 on C5. Trace retrolisthesis of C5 on C6.      VERTEBRAE: The vertebral bodies are normal in height. There is no  fracture or aggressive osseous lesion.      DISCS: Mild disc height loss at C5-6. Remaining disc heights are  overall maintained.      PARAVERTEBRAL SOFT TISSUES: Probable multiple nodules in left thyroid  lobe measuring up to 1.7 x 1.6 cm.      EVALUATION OF INDIVIDUAL LEVELS DEMONSTRATES:  No definite high-grade canal stenosis at any level. Multilevel  foraminal stenosis most notable at C4-5 where there is moderate left  foraminal stenosis secondary to uncovertebral and facet hypertrophy.      Impression: No acute intracranial abnormality. Moderate microangiopathic disease  and mild diffuse parenchymal volume loss.      No acute maxillofacial fracture.      No acute traumatic injury of the cervical spine.      Signed by: Yenni Novoa 8/2/2025 2:35 PM  Dictation workstation:   [id number]  XR chest 2 views  Narrative: Interpreted By:  Bud Valiente,   STUDY:  XR CHEST 2 VIEWS;  8/2/2025 12:44 pm      INDICATION:  Signs/Symptoms:Fall.          COMPARISON:  12/30/2021      ACCESSION NUMBER(S):  [id number]      ORDERING CLINICIAN:  ASHER CUBA      FINDINGS:  PA and lateral radiographs of the chest were provided.          CARDIOMEDIASTINAL SILHOUETTE:  Cardiomediastinal silhouette is normal in size and configuration.      LUNGS:  Mild bibasilar interstitial prominence. No focal consolidation. No  pneumothorax. No focal consolidation, pleural effusion, or  pneumothorax.      ABDOMEN:  No remarkable upper abdominal findings.      BONES:  No acute osseous changes.      Impression: 1.  Mild bibasilar interstitial prominence. Correlate with volume  status.          MACRO:  None      Signed by: Bud Valiente 8/2/2025  1:59 PM  Dictation workstation:   [id number]  XR pelvis 1-2 views  Narrative: Interpreted By:  Bud Valiente,   STUDY:  XR PELVIS 1-2 VIEWS; ;  8/2/2025 12:44 pm      INDICATION:  Signs/Symptoms:Fall.          COMPARISON:  None.      ACCESSION NUMBER(S):  [id number]      ORDERING CLINICIAN:  ASHER CUBA      FINDINGS:  AP view of the pelvis.      No acute fracture. No dislocation. Mild bilateral hip osteoarthrosis.  Lower lumbar degenerative change.      Impression: Mild bilateral hip osteoarthrosis. No acute fracture or dislocation.      MACRO:  None      Signed by: Bud Valiente 8/2/2025 1:58 PM  Dictation workstation:   [id number]      Physical Exam  Constitutional:       Appearance: Normal appearance.   HENT:      Nose: Nose normal.     Eyes:      Extraocular Movements: Extraocular movements intact.       Cardiovascular:      Rate and Rhythm: Normal rate and regular rhythm.   Pulmonary:      Effort: Pulmonary effort is normal.      Breath sounds: Normal breath sounds.   Abdominal:      General: Abdomen is flat. Bowel sounds are normal.      Palpations: Abdomen is soft.     Musculoskeletal:         General: Normal range of motion.     Skin:     General: Skin is warm.     Neurological:      Mental Status: She is alert and oriented to person, place, and time.      Comments: Circumferential speech   Psychiatric:         Mood and Affect: Mood normal.       Relevant Results  Scheduled medications  Scheduled Medications[1]  Continuous medications  Continuous Medications[2]  PRN medications  PRN Medications[3]    Results for orders placed or performed during the hospital encounter of 08/02/25 (from the past 24 hours)   Troponin I, High Sensitivity   Result Value Ref Range    Troponin I, High Sensitivity (CMC) 154 (HH) 0 - 34 ng/L   Lipid panel   Result Value Ref Range    Cholesterol 160 0 - 199 mg/dL    HDL-Cholesterol 70.0 mg/dL    Cholesterol/HDL Ratio 2.3     LDL Calculated 72 <=99 mg/dL    VLDL 18 0 - 40 mg/dL     Triglycerides 88 0 - 149 mg/dL    Non HDL Cholesterol 90 0 - 149 mg/dL   Hemoglobin A1c   Result Value Ref Range    Hemoglobin A1C 5.6 See comment %    Estimated Average Glucose 114 Not Established mg/dL          Assessment & Plan  NSTEMI (non-ST elevated myocardial infarction) (Multi)    Pt is a 89 y.o. female with hx of right-sided invasive ductal carcinoma, clinical stage IB (T2 N0 M0),  dementia,  hypertension, HLD, seizure disorder who presents after an unwitnessed fall last night. Patient was admitted for further workup after syncope, fall and tropenmia. Given patient is without typical and atypical chest pain Tropenima is likely related to Type 2 NSTEMI ( Demand/Supply Mismatch). Patient reporting urinary frequency/urgency with positive UA likely related to UTI.  Syncope possibly related to Orthostasis 2/2 hypovolemia from UTI, Vasovagal vs Cardiac/structural. Will further evaluate causes of syncope and will have PT/OT evaluation given recent fall.     Plan:    #Syncope  #Fall  :: Ddx: Orthostasis related to UTI vs Vasovagal vs Cardiac/Structural   ::Imaging unremarkable including CTH, cervical spine, maxillofacial bones w/o con, and XR pelvis   Plan  - PT/OT evaluation pending  - Tylenol prn for pain   - TTE pending to evaluate structural heart etiology of syncope  -Orthostats pending  -UDS addon pending given was positive for Tramadol on previous admission     #Uncomplicated UTI  -CTX x 3 days       #NSTEMI likely Type 2   ::EKG NSR w/o ST elevation   :: A1C 5.6%,   Plan  - rops 150 -> 159 -> 154, will stop trending  -Stat EKG and ACS workup/management if endorsing chest pain  - C/h lipitor 10 mg and ASA 81 mg daily   - C/h metop succinate 100 mg daily     #Chronic conditions  - HTN: c/h valsartan 320 mg daily   - Dementia: c/h sertraline 25 mg and seroquel 12.5 mg daily   - Seizure disorder: c/h keppra 500 mg BID   - H/o breast carcinoma in remission: c/h anastrozole      F : PRN  E: PRN  N: Adult diet  Regular  A:  PIV  DVT prophylaxis: Lovenox subq    Code Status: Full Code (confirmed on admission)   NOK: Extended Emergency Contact Information  Primary Emergency Contact: Mary Sebastian  Home Phone: [phone]  Mobile Phone: [phone]  Relation: Child  Secondary Emergency Contact: Micah Reddy   [place]  Mobile Phone: [phone]  Relation: Child      Arsenio Harden MD    DISPO: TTE, Syncope w/u  FOLLOW [employer]: PCP    The total time spent on the floor rendering services for this patient  and coordinating the patient's care (obtaining/reviewing additional history, performing medically appropriate exam and/or evaluation, reviewing the electronic chart, labs, imaging, independently interpreting the results and communicating and discussing with medical teams, counseling and educating the patient, discussing with family members, ordering medications, tests and/or procedures, coordinating care, and documentation) was 55 minutes        Arsenio Harden MD           [1] anastrozole, 1 mg, oral, Daily  aspirin, 81 mg, oral, Daily  atorvastatin, 10 mg, oral, Nightly  cefTRIAXone, 1 g, intravenous, q24h  enoxaparin, 40 mg, subcutaneous, q24h  levETIRAcetam, 500 mg, oral, BID  metoprolol succinate XL, 100 mg, oral, Daily  QUEtiapine, 12.5 mg, oral, BID  sennosides-docusate sodium, 2 tablet, oral, BID  sertraline, 25 mg, oral, Daily  valsartan, 320 mg, oral, Daily  [2]    [3] PRN medications: acetaminophen

## 2025-08-04 ENCOUNTER — APPOINTMENT (OUTPATIENT)
Dept: CARDIOLOGY | Facility: HOSPITAL | Age: 89
End: 2025-08-04
Payer: MEDICARE

## 2025-08-04 LAB
BACTERIA UR CULT: NORMAL
HOLD SPECIMEN: NORMAL

## 2025-08-04 PROCEDURE — 93306 TTE W/DOPPLER COMPLETE: CPT

## 2025-08-04 PROCEDURE — 97530 THERAPEUTIC ACTIVITIES: CPT | Mod: GP | Performed by: PHYSICAL THERAPIST

## 2025-08-04 PROCEDURE — 36415 COLL VENOUS BLD VENIPUNCTURE: CPT

## 2025-08-04 PROCEDURE — 2500000001 HC RX 250 WO HCPCS SELF ADMINISTERED DRUGS (ALT 637 FOR MEDICARE OP)

## 2025-08-04 PROCEDURE — 1200000002 HC GENERAL ROOM WITH TELEMETRY DAILY

## 2025-08-04 PROCEDURE — 2500000002 HC RX 250 W HCPCS SELF ADMINISTERED DRUGS (ALT 637 FOR MEDICARE OP, ALT 636 FOR OP/ED)

## 2025-08-04 PROCEDURE — 97161 PT EVAL LOW COMPLEX 20 MIN: CPT | Mod: GP | Performed by: PHYSICAL THERAPIST

## 2025-08-04 PROCEDURE — 2500000004 HC RX 250 GENERAL PHARMACY W/ HCPCS (ALT 636 FOR OP/ED)

## 2025-08-04 PROCEDURE — 99233 SBSQ HOSP IP/OBS HIGH 50: CPT

## 2025-08-04 PROCEDURE — XXE2X19 MEASUREMENT OF CARDIAC OUTPUT, COMPUTER-AIDED ASSESSMENT, NEW TECHNOLOGY GROUP 9: ICD-10-PCS

## 2025-08-04 PROCEDURE — 0932T N-INVS DET HRT FAIL AUG ECHO: CPT

## 2025-08-04 PROCEDURE — C8929 TTE W OR WO FOL WCON,DOPPLER: HCPCS

## 2025-08-04 RX ADMIN — METOPROLOL SUCCINATE 100 MG: 100 TABLET, EXTENDED RELEASE ORAL at 08:43

## 2025-08-04 RX ADMIN — HUMAN ALBUMIN MICROSPHERES AND PERFLUTREN 1.75 ML: 10; .22 INJECTION, SOLUTION INTRAVENOUS at 12:26

## 2025-08-04 RX ADMIN — QUETIAPINE FUMARATE 12.5 MG: 25 TABLET ORAL at 20:33

## 2025-08-04 RX ADMIN — SODIUM CHLORIDE, SODIUM LACTATE, POTASSIUM CHLORIDE, AND CALCIUM CHLORIDE 500 ML: 600; 310; 30; 20 INJECTION, SOLUTION INTRAVENOUS at 18:34

## 2025-08-04 RX ADMIN — LEVETIRACETAM 500 MG: 500 TABLET, FILM COATED ORAL at 20:33

## 2025-08-04 RX ADMIN — ASPIRIN 81 MG: 81 TABLET, COATED ORAL at 08:43

## 2025-08-04 RX ADMIN — QUETIAPINE FUMARATE 12.5 MG: 25 TABLET ORAL at 08:44

## 2025-08-04 RX ADMIN — SENNOSIDES AND DOCUSATE SODIUM 2 TABLET: 50; 8.6 TABLET ORAL at 08:43

## 2025-08-04 RX ADMIN — SERTRALINE HYDROCHLORIDE 25 MG: 25 TABLET ORAL at 08:44

## 2025-08-04 RX ADMIN — ENOXAPARIN SODIUM 40 MG: 100 INJECTION SUBCUTANEOUS at 20:33

## 2025-08-04 RX ADMIN — SENNOSIDES AND DOCUSATE SODIUM 2 TABLET: 50; 8.6 TABLET ORAL at 20:33

## 2025-08-04 RX ADMIN — VALSARTAN 320 MG: 160 TABLET, FILM COATED ORAL at 08:44

## 2025-08-04 RX ADMIN — LEVETIRACETAM 500 MG: 500 TABLET, FILM COATED ORAL at 08:46

## 2025-08-04 RX ADMIN — ATORVASTATIN CALCIUM 10 MG: 20 TABLET, FILM COATED ORAL at 20:33

## 2025-08-04 RX ADMIN — CEFTRIAXONE 1 G: 1 INJECTION, SOLUTION INTRAVENOUS at 13:30

## 2025-08-04 RX ADMIN — ANASTROZOLE 1 MG: 1 TABLET ORAL at 10:14

## 2025-08-04 SDOH — SOCIAL STABILITY: SOCIAL INSECURITY: WITHIN THE LAST YEAR, HAVE YOU BEEN HUMILIATED OR EMOTIONALLY ABUSED IN OTHER WAYS BY YOUR PARTNER OR EX-PARTNER?: NO

## 2025-08-04 SDOH — SOCIAL STABILITY: SOCIAL INSECURITY: WITHIN THE LAST YEAR, HAVE YOU BEEN AFRAID OF YOUR PARTNER OR EX-PARTNER?: NO

## 2025-08-04 SDOH — SOCIAL STABILITY: SOCIAL INSECURITY: HAVE YOU HAD THOUGHTS OF HARMING ANYONE ELSE?: NO

## 2025-08-04 SDOH — HEALTH STABILITY: MENTAL HEALTH: HOW MANY DRINKS CONTAINING ALCOHOL DO YOU HAVE ON A TYPICAL DAY WHEN YOU ARE DRINKING?: PATIENT DOES NOT DRINK

## 2025-08-04 SDOH — HEALTH STABILITY: MENTAL HEALTH: HOW OFTEN DO YOU HAVE A DRINK CONTAINING ALCOHOL?: NEVER

## 2025-08-04 SDOH — ECONOMIC STABILITY: INCOME INSECURITY: IN THE PAST 12 MONTHS HAS THE ELECTRIC, GAS, OIL, OR WATER COMPANY THREATENED TO SHUT OFF SERVICES IN YOUR HOME?: NO

## 2025-08-04 SDOH — SOCIAL STABILITY: SOCIAL INSECURITY: ABUSE: ADULT

## 2025-08-04 SDOH — SOCIAL STABILITY: SOCIAL INSECURITY: DO YOU FEEL ANYONE HAS EXPLOITED OR TAKEN ADVANTAGE OF YOU FINANCIALLY OR OF YOUR PERSONAL PROPERTY?: NO

## 2025-08-04 SDOH — HEALTH STABILITY: MENTAL HEALTH: HOW OFTEN DO YOU HAVE SIX OR MORE DRINKS ON ONE OCCASION?: NEVER

## 2025-08-04 SDOH — SOCIAL STABILITY: SOCIAL INSECURITY
WITHIN THE LAST YEAR, HAVE YOU BEEN KICKED, HIT, SLAPPED, OR OTHERWISE PHYSICALLY HURT BY YOUR PARTNER OR EX-PARTNER?: NO

## 2025-08-04 SDOH — ECONOMIC STABILITY: FOOD INSECURITY: WITHIN THE PAST 12 MONTHS, YOU WORRIED THAT YOUR FOOD WOULD RUN OUT BEFORE YOU GOT THE MONEY TO BUY MORE.: NEVER TRUE

## 2025-08-04 SDOH — SOCIAL STABILITY: SOCIAL INSECURITY: WERE YOU ABLE TO COMPLETE ALL THE BEHAVIORAL HEALTH SCREENINGS?: YES

## 2025-08-04 SDOH — SOCIAL STABILITY: SOCIAL INSECURITY
WITHIN THE LAST YEAR, HAVE YOU BEEN RAPED OR FORCED TO HAVE ANY KIND OF SEXUAL ACTIVITY BY YOUR PARTNER OR EX-PARTNER?: NO

## 2025-08-04 SDOH — ECONOMIC STABILITY: FOOD INSECURITY: WITHIN THE PAST 12 MONTHS, THE FOOD YOU BOUGHT JUST DIDN'T LAST AND YOU DIDN'T HAVE MONEY TO GET MORE.: NEVER TRUE

## 2025-08-04 SDOH — SOCIAL STABILITY: SOCIAL INSECURITY: HAS ANYONE EVER THREATENED TO HURT YOUR FAMILY OR YOUR PETS?: NO

## 2025-08-04 SDOH — SOCIAL STABILITY: SOCIAL INSECURITY: DOES ANYONE TRY TO KEEP YOU FROM HAVING/CONTACTING OTHER FRIENDS OR DOING THINGS OUTSIDE YOUR HOME?: NO

## 2025-08-04 SDOH — SOCIAL STABILITY: SOCIAL INSECURITY: DO YOU FEEL UNSAFE GOING BACK TO THE PLACE WHERE YOU ARE LIVING?: NO

## 2025-08-04 SDOH — SOCIAL STABILITY: SOCIAL INSECURITY: ARE THERE ANY APPARENT SIGNS OF INJURIES/BEHAVIORS THAT COULD BE RELATED TO ABUSE/NEGLECT?: NO

## 2025-08-04 SDOH — SOCIAL STABILITY: SOCIAL INSECURITY: ARE YOU OR HAVE YOU BEEN THREATENED OR ABUSED PHYSICALLY, EMOTIONALLY, OR SEXUALLY BY ANYONE?: NO

## 2025-08-04 ASSESSMENT — PATIENT HEALTH QUESTIONNAIRE - PHQ9
2. FEELING DOWN, DEPRESSED OR HOPELESS: SEVERAL DAYS
SUM OF ALL RESPONSES TO PHQ9 QUESTIONS 1 & 2: 1
1. LITTLE INTEREST OR PLEASURE IN DOING THINGS: NOT AT ALL

## 2025-08-04 ASSESSMENT — ACTIVITIES OF DAILY LIVING (ADL)
PATIENT'S MEMORY ADEQUATE TO SAFELY COMPLETE DAILY ACTIVITIES?: YES
TOILETING: NEEDS ASSISTANCE
ADEQUATE_TO_COMPLETE_ADL: YES
HEARING - LEFT EAR: DIFFICULTY WITH NOISE
LACK_OF_TRANSPORTATION: NO
ASSISTIVE_DEVICE: OTHER (COMMENT)
HEARING - RIGHT EAR: DIFFICULTY WITH NOISE
JUDGMENT_ADEQUATE_SAFELY_COMPLETE_DAILY_ACTIVITIES: YES
FEEDING YOURSELF: NEEDS ASSISTANCE
GROOMING: NEEDS ASSISTANCE
WALKS IN HOME: NEEDS ASSISTANCE
DRESSING YOURSELF: NEEDS ASSISTANCE
BATHING: NEEDS ASSISTANCE
ADL_ASSISTANCE: INDEPENDENT

## 2025-08-04 ASSESSMENT — COGNITIVE AND FUNCTIONAL STATUS - GENERAL
EATING MEALS: A LITTLE
MOBILITY SCORE: 12
MOVING FROM LYING ON BACK TO SITTING ON SIDE OF FLAT BED WITH BEDRAILS: A LITTLE
MOBILITY SCORE: 12
WALKING IN HOSPITAL ROOM: TOTAL
HELP NEEDED FOR BATHING: A LOT
CLIMB 3 TO 5 STEPS WITH RAILING: TOTAL
STANDING UP FROM CHAIR USING ARMS: A LOT
MOVING FROM LYING ON BACK TO SITTING ON SIDE OF FLAT BED WITH BEDRAILS: A LITTLE
MOVING TO AND FROM BED TO CHAIR: A LOT
WALKING IN HOSPITAL ROOM: A LOT
TURNING FROM BACK TO SIDE WHILE IN FLAT BAD: A LITTLE
DAILY ACTIVITIY SCORE: 15
DRESSING REGULAR UPPER BODY CLOTHING: A LITTLE
TURNING FROM BACK TO SIDE WHILE IN FLAT BAD: A LOT
PERSONAL GROOMING: A LITTLE
TOILETING: A LOT
MOVING TO AND FROM BED TO CHAIR: A LOT
CLIMB 3 TO 5 STEPS WITH RAILING: TOTAL
STANDING UP FROM CHAIR USING ARMS: A LOT
PATIENT BASELINE BEDBOUND: NO
DRESSING REGULAR LOWER BODY CLOTHING: A LOT

## 2025-08-04 ASSESSMENT — PAIN - FUNCTIONAL ASSESSMENT
PAIN_FUNCTIONAL_ASSESSMENT: 0-10
PAIN_FUNCTIONAL_ASSESSMENT: 0-10

## 2025-08-04 ASSESSMENT — PAIN SCALES - GENERAL
PAINLEVEL_OUTOF10: 0 - NO PAIN

## 2025-08-04 ASSESSMENT — LIFESTYLE VARIABLES
SKIP TO QUESTIONS 9-10: 1
AUDIT-C TOTAL SCORE: 0

## 2025-08-04 NOTE — PROGRESS NOTES
Ann Marie Murphy is a 89 y.o. female on day 2 of admission presenting with NSTEMI (non-ST elevated myocardial infarction) (Multi).      Subjective   No acute overnight events.     Objective     Last Recorded Vitals  /66   Pulse 80   Temp 36.7 °C (98 °F) (Oral)   Resp 19   Wt 54.4 kg (120 lb)   SpO2 98%   Intake/Output last 3 Shifts:  No intake or output data in the 24 hours ending 08/04/25 1716    Admission Weight  Weight: 54.4 kg (120 lb) (08/02/25 1126)    Daily Weight  08/02/25 : 54.4 kg (120 lb)    Image Results  CT head wo IV contrast, CT cervical spine wo IV contrast, CT maxillofacial bones wo IV contrast  Narrative: Interpreted By:  Yenni Novoa,   STUDY:  CT CERVICAL SPINE WO IV CONTRAST; CT FACIAL BONES WO IV CONTRAST; CT  HEAD WO IV CONTRAST      INDICATION:  Signs/Symptoms:Fall      COMPARISON:  Brain MRI 04/2025. CT head 04/19/2025.      ACCESSION NUMBER(S):  [id number]; [id number]; [id number]      ORDERING CLINICIAN:  ASHER CUBA      TECHNIQUE:  CT of the head, face and cervical spine was performed without the  administration of intravenous contrast.      FINDINGS:  CT HEAD:      No acute infarction, intracranial hemorrhage or mass lesion. Mild  microangiopathic disease and moderate diffuse parenchymal volume loss.      No hydrocephalus.  No extra-axial fluid collections.      CT FACE:      SKIN AND SUBCUTANEOUS SOFT TISSUES: No asymmetrical soft tissue  swelling or hematoma.      OSSEOUS STRUCTURES: No fracture, dislocation or destructive lesion.      ORBITS: The globes, retrobulbar fat, extraocular muscles, and optic  nerve sheath complexes are intact and normal in morphology.      PARANASAL SINUSES: Moderate mucoperiosteal thickening of the right  maxillary sinus. Small mucous retention cysts in the right sphenoid  sinus.      MASTOID AIR CELLS: Clear.      CT CERVICAL SPINE:      ALIGNMENT: Reversal of usual cervical lordosis. Mild anterolisthesis  of C3 on C4 and C4 on C5.  Trace retrolisthesis of C5 on C6.      VERTEBRAE: The vertebral bodies are normal in height. There is no  fracture or aggressive osseous lesion.      DISCS: Mild disc height loss at C5-6. Remaining disc heights are  overall maintained.      PARAVERTEBRAL SOFT TISSUES: Probable multiple nodules in left thyroid  lobe measuring up to 1.7 x 1.6 cm.      EVALUATION OF INDIVIDUAL LEVELS DEMONSTRATES:  No definite high-grade canal stenosis at any level. Multilevel  foraminal stenosis most notable at C4-5 where there is moderate left  foraminal stenosis secondary to uncovertebral and facet hypertrophy.      Impression: No acute intracranial abnormality. Moderate microangiopathic disease  and mild diffuse parenchymal volume loss.      No acute maxillofacial fracture.      No acute traumatic injury of the cervical spine.      Signed by: Yenni Novoa 8/2/2025 2:35 PM  Dictation workstation:   [id number]  XR chest 2 views  Narrative: Interpreted By:  Bud Valiente,   STUDY:  XR CHEST 2 VIEWS;  8/2/2025 12:44 pm      INDICATION:  Signs/Symptoms:Fall.          COMPARISON:  12/30/2021      ACCESSION NUMBER(S):  [id number]      ORDERING CLINICIAN:  ASHER CUBA      FINDINGS:  PA and lateral radiographs of the chest were provided.          CARDIOMEDIASTINAL SILHOUETTE:  Cardiomediastinal silhouette is normal in size and configuration.      LUNGS:  Mild bibasilar interstitial prominence. No focal consolidation. No  pneumothorax. No focal consolidation, pleural effusion, or  pneumothorax.      ABDOMEN:  No remarkable upper abdominal findings.      BONES:  No acute osseous changes.      Impression: 1.  Mild bibasilar interstitial prominence. Correlate with volume  status.          MACRO:  None      Signed by: Bud Valiente 8/2/2025 1:59 PM  Dictation workstation:   [id number]  XR pelvis 1-2 views  Narrative: Interpreted By:  Bud Valiente,   STUDY:  XR PELVIS 1-2 VIEWS; ;  8/2/2025 12:44 pm      INDICATION:  Signs/Symptoms:Fall.           COMPARISON:  None.      ACCESSION NUMBER(S):  [id number]      ORDERING CLINICIAN:  ASHER CUBA      FINDINGS:  AP view of the pelvis.      No acute fracture. No dislocation. Mild bilateral hip osteoarthrosis.  Lower lumbar degenerative change.      Impression: Mild bilateral hip osteoarthrosis. No acute fracture or dislocation.      MACRO:  None      Signed by: Bud Valiente 8/2/2025 1:58 PM  Dictation workstation:   [id number]      Physical Exam  Constitutional:       Appearance: Normal appearance.   HENT:      Nose: Nose normal.     Eyes:      Extraocular Movements: Extraocular movements intact.       Cardiovascular:      Rate and Rhythm: Normal rate and regular rhythm.   Pulmonary:      Effort: Pulmonary effort is normal.      Breath sounds: Normal breath sounds.   Abdominal:      General: Abdomen is flat. Bowel sounds are normal.      Palpations: Abdomen is soft.     Musculoskeletal:         General: Normal range of motion.     Skin:     General: Skin is warm.     Neurological:      Mental Status: She is alert and oriented to person, place, and time.      Comments: Circumferential speech   Psychiatric:         Mood and Affect: Mood normal.       Relevant Results  Scheduled medications  Scheduled Medications[1]  Continuous medications  Continuous Medications[2]  PRN medications  PRN Medications[3]    Results for orders placed or performed during the hospital encounter of 08/02/25 (from the past 24 hours)   Transthoracic Echo Complete   Result Value Ref Range    BSA 1.52 m2          Assessment & Plan  NSTEMI (non-ST elevated myocardial infarction) (Multi)    Pt is a 89 y.o. female with hx of right-sided invasive ductal carcinoma, clinical stage IB (T2 N0 M0),  dementia,  hypertension, HLD, seizure disorder who presents after an unwitnessed fall last night. Patient was admitted for further workup after syncope, fall and tropenmia. Given patient is without typical and atypical chest pain Tropenima is  likely related to Type 2 NSTEMI ( Demand/Supply Mismatch). Patient reporting urinary frequency/urgency with positive UA likely related to UTI.  Syncope possibly related to Orthostasis 2/2 hypovolemia from UTI, Vasovagal vs Cardiac/structural. Will further evaluate causes of syncope and will have PT/OT evaluation given recent fall.     Plan:    #Syncope  #Fall  :: Ddx: Orthostasis related to UTI vs Vasovagal vs Cardiac/Structural   ::Imaging unremarkable including CTH, cervical spine, maxillofacial bones w/o con, and XR pelvis   Plan  - PT-Rec SNF  - Tylenol prn for pain   - TTE pending to evaluate structural heart etiology of syncope  -Orthostats positive, giving 500 ml LR  -UDS Negative    #Uncomplicated UTI  ::Ucx-Clinically insignificant growth based on current clinical standards.   -CTX x 3 days       #NSTEMI likely Type 2   ::EKG NSR w/o ST elevation   :: A1C 5.6%,   Plan  - rops 150 -> 159 -> 154, will stop trending  -Stat EKG and ACS workup/management if endorsing chest pain  - C/h lipitor 10 mg and ASA 81 mg daily   - C/h metop succinate 100 mg daily     #Chronic conditions  - HTN: c/h valsartan 320 mg daily   - Dementia: c/h sertraline 25 mg and seroquel 12.5 mg daily   - Seizure disorder: c/h keppra 500 mg BID   - H/o breast carcinoma in remission: c/h anastrozole      F : PRN  E: PRN  N: Adult diet Regular  A:  PIV  DVT prophylaxis: Lovenox subq    Code Status: Full Code (confirmed on admission)   NOK: Extended Emergency Contact Information  Primary Emergency Contact: Mary Sebastian  Home Phone: [phone]  Mobile Phone: [phone]  Relation: Daughter  Secondary Emergency Contact: Micah Reddy   [place]  Mobile Phone: [phone]  Relation: Daughter      Arsenio Harden MD    DISPO: SNF pending TTE   FOLLOW [employer]: PCP    The total time spent on the floor rendering services for this patient  and coordinating the patient's care (obtaining/reviewing additional history, performing  medically appropriate exam and/or evaluation, reviewing the electronic chart, labs, imaging, independently interpreting the results and communicating and discussing with medical teams, counseling and educating the patient, discussing with family members, ordering medications, tests and/or procedures, coordinating care, and documentation) was 55 minutes        Arsenio Harden MD             [1] anastrozole, 1 mg, oral, Daily  aspirin, 81 mg, oral, Daily  atorvastatin, 10 mg, oral, Nightly  cefTRIAXone, 1 g, intravenous, q24h  enoxaparin, 40 mg, subcutaneous, q24h  levETIRAcetam, 500 mg, oral, BID  metoprolol succinate XL, 100 mg, oral, Daily  QUEtiapine, 12.5 mg, oral, BID  sennosides-docusate sodium, 2 tablet, oral, BID  sertraline, 25 mg, oral, Daily  valsartan, 320 mg, oral, Daily     [2]    [3] PRN medications: acetaminophen

## 2025-08-04 NOTE — PROGRESS NOTES
Physical Therapy Attempt                 Therapy Communication Note    Patient Name: Ann Marie Murphy  MRN: [id number]  Department:   [place]: [place] [address] [address]  Today's Date: 8/4/2025     Discipline: Physical Therapy    Missed Visit: PT Missed Visit: Yes     Missed Visit Reason: Missed Visit Reason:  (Pt. off the floor at ECHO. Will reattempt as schedule allows.)    Missed Time: Attempt

## 2025-08-04 NOTE — PROGRESS NOTES
Pharmacy Medication History Review    Ann Marie Murphy is a 89 y.o. female admitted for NSTEMI (non-ST elevated myocardial infarction) (Multi). Pharmacy reviewed the patient's [id number] medications and allergies for accuracy.    Medications ADDED:  N/A  Medications CHANGED:  N/A  Medications REMOVED:   N/A     The list below reflects the updated PTA list.   Prior to Admission Medications   Prescriptions Last Dose Informant   QUEtiapine (SEROquel) 25 mg tablet 8/1/2025 Self   Sig: Take 0.5 tablets (12.5 mg) by mouth 2 times a day.   acetaminophen (Tylenol) 325 mg tablet 8/1/2025 Self   Sig: Take 2 tablets (650 mg) by mouth every 4 hours if needed for mild pain (1 - 3) or moderate pain (4 - 6).   anastrozole (Arimidex) 1 mg tablet 8/1/2025 Self   Sig: Take 1 tablet (1 mg total) by mouth once daily.   ascorbic acid, vitamin C, 500 mg capsule 8/1/2025 Self   Sig: Take 1 capsule by mouth once daily.   aspirin 81 mg EC tablet 8/1/2025 Self   Sig: Take 1 tablet (81 mg) by mouth once daily.   atorvastatin (Lipitor) 10 mg tablet Not Taking Self   Sig: Take 1 tablet (10 mg) by mouth once daily at bedtime.   Patient not taking: Reported on 8/4/2025   cholecalciferol (Vitamin D-3) 50 MCG (2000 UT) tablet 8/1/2025 Self   Sig: Take 1 tablet (50 mcg) by mouth once daily.   levETIRAcetam (Keppra) 500 mg tablet 8/1/2025 Self   Sig: TAKE 2 TABLETS BY MOUTH DAILY   metoprolol succinate XL (Toprol-XL) 100 mg 24 hr tablet 8/1/2025 Self   Sig: Take 1 tablet (100 mg) by mouth once daily.   sertraline (Zoloft) 25 mg tablet Not Taking Self   Sig: Take 1 tablet (25 mg) by mouth once daily.   Patient not taking: Reported on 8/4/2025   sertraline (Zoloft) 25 mg tablet 8/1/2025 Self   Sig: Take 1 tablet (25 mg) by mouth once daily.   valsartan (Diovan) 320 mg tablet 8/1/2025 Self   Sig: Take 1 tablet (320 mg) by mouth once daily.      Facility-Administered Medications: None        The list below reflects the updated allergy list.  "Please review each documented allergy for additional clarification and justification.  Allergies  Reviewed by Ellen Del Rosario on 8/4/2025        Severity Reactions Comments    Adhesive Tape-silicones Not Specified Unknown     Cortisone Not Specified Swelling     Diphenhydramine Not Specified Hallucinations, Other \"goes out of head\"    Latex Not Specified Unknown     Morphine Not Specified Unknown             Patient declines M2B at discharge.     Sources:   [facility]  Pharmacy dispense history  Child Daughter Mary Pacheco historian  Chart Review  Care Everywhere     Additional Comments:  I interviewed the patients daughter over the phone to confirm the medlist above       ELLEN DEL ROSARIO  Pharmacy Technician  08/04/25     Secure Chat preferred   If no response call [phone] or [employer] \"Med Rec\"   "

## 2025-08-04 NOTE — PROGRESS NOTES
Physical Therapy    Physical Therapy Evaluation & Treatment    Patient Name: Ann Marie Murphy  MRN: [id number]  Department: [facility] ED  Room: [place] [id number]  Today's Date: 8/4/2025   Time Calculation  Start Time: 1349  Stop Time: 1417  Time Calculation (min): 28 min    Assessment/Plan   PT Assessment  PT Assessment Results: Decreased strength, Decreased endurance, Impaired balance, Decreased mobility, Decreased cognition, Decreased safety awareness  Rehab Prognosis: Fair  Barriers to Discharge Home: Caregiver assistance, Physical needs, Cognition needs  Caregiver Assistance: Patient lives alone and/or does not have reliable caregiver assistance  Cognition Needs: Insight of patient limited regarding functional ability/needs  Physical Needs: Stair navigation into home limited by function/safety, Ambulating household distances limited by function/safety, 24hr ADL assistance needed, High falls risk due to function or environment  Evaluation/Treatment Tolerance: Patient limited by fatigue  Medical Staff Made Aware: Yes  End of Session Communication: Bedside nurse  Assessment Comment: Pt. is an 89 y/o F admitted with NSTEMI s/p found down by family. Pt. presents pleasantly confused with weakness, impaired balance, decreased endurance, and difficulty with all functional mobility compared to baseline. Pt. would benefit from skilled PT while IP to address these deficits.  End of Session Patient Position: Bed, 3 rail up, Alarm off, not on at start of session   IP OR SWING BED PT PLAN  Inpatient or Swing Bed: Inpatient  PT Plan  Treatment/Interventions: Bed mobility, Transfer training, Gait training, Stair training, Balance training, Strengthening, Endurance training, Therapeutic exercise, Therapeutic activity, Home exercise program  PT Plan: Ongoing PT  PT Frequency: 3 times per week (during this IP admission)  PT Discharge Recommendations: Moderate intensity level of continued care (Based on current functional status and  rehab potential, patient is anticipated to tolerate and benefit from 5 or more days per week of skilled rehabilitative therapy after discharge from this acute inpatient hospitalization.)  PT Recommended Transfer Status: Assist x1  PT - OK to Discharge: Yes (PT evaluation complete and rehab recommendations made.)      Subjective     PT Visit Info:  PT Received On: 08/04/25  General Visit Information:  General  Reason for Referral: Presented to ED after syncope, fall and tropenmia. admitted for NSTEMI (non-ST elevated myocardial infarction)  Past Medical History Relevant to Rehab: hx of right-sided invasive ductal carcinoma, clinical stage IB (T2 N0 M0),  dementia,  hypertension, HLD, seizure disorder  Family/Caregiver Present: Yes  Caregiver Feedback: supportive niece present throughuout session  Prior to Session Communication: Bedside nurse  Patient Position Received: Bed, 3 rail up, Alarm off, not on at start of session  General Comment: Pt. supine in bed, pleasantly confused but redirectable and agreeable to participate in session.  Home Living:  Home Living  Type of Home: House (4 family house, relatives live in same building and check in)  Lives With: Alone  Home Adaptive Equipment:  (rollator)  Home Layout: One level  Home Access: Stairs to enter with rails  Entrance Stairs-Number of Steps: 14  Prior Level of Function:  Prior Function Per Pt/Caregiver Report  Level of [place]: Independent with ADLs and functional transfers, Needs assistance with homemaking  Receives Help From: Family  ADL Assistance: Independent  Homemaking Assistance: Needs assistance  Ambulatory Assistance: Independent (with rollator)  Leisure: mainly homebound. Watches TV, reads  Prior Function Comments: -driving, 2 recent falls and consult for neuro due to recent balance deficits.  Precautions:  Precautions  Medical Precautions: Fall precautions     Date/Time Vitals Session Patient Position Pulse Resp SpO2 BP MAP (mmHg)    08/04/25  1349 During PT  --  73  12  97 %  115/74  --     08/04/25 1400 --  --  74  18  98 %  120/97  108           Objective   Pain:  Pain Assessment  Pain Assessment: 0-10  0-10 (Numeric) Pain Score: 0 - No pain  Cognition:  Cognition  Overall Cognitive Status: Within Functional Limits  Orientation Level: Disoriented to time, Disoriented to situation (oriented to self only. Time/place with choices)  Insight: Moderate  Impulsive: Mildly  Processing Speed: Delayed    General Assessments:        Activity Tolerance  Endurance: Decreased tolerance for upright activites    Sensation  Light Touch: No apparent deficits    Strength  Strength Comments: grossly > 4/5 throughout BLEs       Postural Control  Postural Control: Within Functional Limits    Static Sitting Balance  Static Sitting-Balance Support: Feet supported  Static Sitting-Level of Assistance: Close supervision  Dynamic Sitting Balance  Dynamic Sitting-Balance Support: Bilateral upper extremity supported  Dynamic Sitting-Level of Assistance: Contact guard    Static Standing Balance  Static Standing-Balance Support: Bilateral upper extremity supported (with RW)  Static Standing-Level of Assistance: Minimum assistance  Functional Assessments:  Bed Mobility  Bed Mobility: Yes  Bed Mobility 1  Bed Mobility 1: Supine to sitting  Level of Assistance 1: Minimum assistance  Bed Mobility Comments 1: A for trunk righting  Bed Mobility 2  Bed Mobility  2: Sitting to supine  Level of Assistance 2: Moderate assistance  Bed Mobility Comments 2: heavy A to clear BLEs back into bed    Transfers  Transfer: Yes  Transfer 1  Technique 1: Sit to stand, Stand to sit  Transfer Device 1: Walker  Transfer Level of Assistance 1: Moderate assistance  Trials/Comments 1: x2 attempts. Pt. with limited standing tolerance +knee buckling with posterior LOB back into bed    Ambulation/Gait Training  Ambulation/Gait Training Performed: No (unable to safely attempt due to buckling and limited standing  tolerance at EOB)    Stairs  Stairs: No          Treatments:  Therapeutic Activity  Therapeutic Activity Performed: Yes  Therapeutic Activity 1: Pt highly distractible requiring frequent cues and redirection throughout. Skilled monitoring of vitals with mobility: orthostatic BP negative see chart for details. Cues for safety/sequencing with RW. Pt. with limited standing tolerance. Performed STS transfer x 2 with +subjective LE buckling and LOB posterior back into bed with each attempt. Pt. educated in APS, heel slides, SLR while supine in bed awaiting IP bed.  Outcome Measures:  [facility] Basic Mobility  Turning from your back to your side while in a flat bed without using bedrails: A little  Moving from lying on your back to sitting on the side of a flat bed without using bedrails: A little  Moving to and from bed to chair (including a wheelchair): A lot  Standing up from a chair using your arms (e.g. wheelchair or bedside chair): A lot  To walk in hospital room: Total  Climbing 3-5 steps with railing: Total  Basic Mobility - Total Score: 12    Encounter Problems       Encounter Problems (Active)       PT Problem       Pt. will perform bed mobility with CGA        Start:  08/18/25            Pt. will perform transfers with RW with CGA        Start:  08/18/25            Pt. will ambulate > 50 ft. with RW with CGA        Start:  08/18/25            Pt. will ascend/descend 1 flight of stairs with min A to safely enter/exit the home set up        Start:  08/18/25                   Education Documentation  Precautions, taught by Charlene Poon PT at 8/4/2025  2:57 PM.  Learner: Family, Patient  Readiness: Acceptance  Method: Explanation  Response: Verbalizes Understanding, Needs Reinforcement  Comment: role of PT    Body Mechanics, taught by Charlene Poon PT at 8/4/2025  2:57 PM.  Learner: Family, Patient  Readiness: Acceptance  Method: Explanation  Response: Verbalizes Understanding, Needs Reinforcement  Comment:  role of PT    Mobility Training, taught by Charlene Poon PT at 8/4/2025  2:57 PM.  Learner: Family, Patient  Readiness: Acceptance  Method: Explanation  Response: Verbalizes Understanding, Needs Reinforcement  Comment: role of PT    Education Comments  No comments found.

## 2025-08-04 NOTE — CONSULTS
DERMATOLOGY DEPARTMENT CONSULTATION NOTE  Name: Ann Marie Murphy  MRN: [id number]  : 1935    Reason for consultation: lesions of concern on face    History of Present Illness  Ann Marie Murphy is a 89 y.o. female with a past medical history of right-sided invasive ductal carcinoma, clinical stage IB (T2 N0 M0),  dementia,  hypertension, HLD, seizure disorder who presented on 2025 after an unwitnessed fall and was admitted for NSTEMI. Dermatology was consulted for lesions of concern on the face.    Patient reports that she has longstanding lesions on her face and frontal hairline. One on her right cheek has gotten particularly bothersome as it has gotten bigger. She denies any pain, bleeding, or pruritus from these lesions. She is most concerned about their cosmetic appearance. Patient additionally reports that her family members have similar looking spots on their face as well. She notes that she previously saw an outside dermatologist for this concern and they counseled her that removal may leave a scar.    Patient denies any personal or family history of skin cancer.    Review of Systems  Review of Systems   All other systems reviewed and are negative.       Past Medical History  Medical History[1]    Past Surgical History   has a past surgical history that includes Colonoscopy (2014); Exploratory laparotomy (2014); Appendectomy (2014); Umbilical hernia repair (2014); Hysterectomy (2014); Hernia repair (2014); Other surgical history (2014); Other surgical history (2014); and Breast biopsy (Right, 2022).     Allergies  Allergies[2]    Medications  Scheduled Meds: Scheduled Medications[3]   Continuous Infusions: Continuous Medications[4]   PRN Meds: PRN Medications[5]     Family History  Family History[6]    Social History   reports that she has quit smoking. Her smoking use included cigarettes. She has never used smokeless tobacco. She reports that  she does not currently use alcohol. She reports that she does not currently use drugs.     Objective    Vitals:    08/04/25 0400 08/04/25 0948 08/04/25 1349 08/04/25 1400   BP: 144/68 134/69 115/74 (!) 120/97   Pulse: 72 72 73 74   Resp: 16 18 12 18   Temp:       TempSrc:       SpO2: 99% 96% 97% 98%   Weight:       Height:            Exam    GEN: no acute distress  NEURO: moving all extremities   EYES: conjunctiva and eyelids normal. No conjunctival injection or erosions appreciated  ENT:   - Lips: normal  - Teeth/gums: normal  - Oropharynx: normal tongue and mucosa  NECK: normal and symmetric.   CV: no varicosities, warmth or tenderness of extremities.  GI: Flat abdomen. Non-tender. No hepatosplenomegaly.  LYMPH: no LAD  EXTREMITIES: no distal digital clubbing, cyanosis, petechiae   SKIN: A full body skin exam including scalp, face, eyes, ears, neck, trunk, bilateral upper & lower extremities, toenails and fingernails were examined with the following findings:  - Scattered over the face are dark brown/grey stuck on papules and plaques consistent with seborrheic keratoses      Laboratory and Data  Results for orders placed or performed during the hospital encounter of 08/02/25 (from the past 24 hours)   Transthoracic Echo Complete   Result Value Ref Range    BSA 1.52 m2        Assessment/Plan   Ann Marie Murphy is a 89 y.o. female with a past medical history of right-sided invasive ductal carcinoma, clinical stage IB (T2 N0 M0),  dementia,  hypertension, HLD, seizure disorder who presented on 8/2/2025 after an unwitnessed fall and was admitted for NSTEMI. Dermatology was consulted for lesions of concern on the face.    Differential diagnoses: seborrheic keratoses    Impression:  Patient's facial papules and plaques are consistent with seborrheic keratoses. These are benign lesions and that no treatment is necessary unless it becomes painful or increases in size. Patient opts for removal of lesion on the right  medial cheek and monitoring of the other lesions.     Recommendations:  - Removal of seborrheic keratosis of the right cheek today after risks/benefits reviewed including pain, redness, swelling, blisters, scar, recurrence, imcomplete removal, dyspigmentation. Verbal informed consent obtained. The area was treated with cryotherapy x 2 cycles using a technique to minimize spread to the surrounding uninvolved skin. Patient tolerated the proedure well. Wound care was reviewed.   - May monitor the other seborrheic keratosis. No other intervention needed.    The patient was seen and discussed with attending physician Dr. Bustamante. The assessment and plan was communicated to the care team.    Thank you for the consultation and for the opportunity to contribute to the care of this patient.      Valerie Haddad MD   PGY3, Dermatology  Epic chat (preferred)  Team pager [phone]     I saw and evaluated the patient. I personally obtained the key and critical portions of the history and physical exam or was physically present for key and critical portions performed by the resident. I reviewed the resident's documentation and discussed the patient with the resident. I agree with the resident's medical decision making as documented in the note.    Bernabe Bustamante MD         [1]   Past Medical History:  Diagnosis Date    Anxiety disorder due to known physiological condition     Anxiety disorder due to a general medical condition    Breast cancer     Cardiomyopathy, unspecified     Cardiomyopathy    Diverticulosis of intestine, part unspecified, without perforation or abscess without bleeding     Diverticulosis    Encounter for screening mammogram for malignant neoplasm of breast     Visit for screening mammogram    Localization-related (focal) (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, not intractable, without status epilepticus     Complex partial seizure    Other conditions influencing health  "status     Ovarian Torsion On The Left    Other conditions influencing health status     Stroke syndrome    Other specified noninflammatory disorders of uterus     Fibrosis of uterus    Personal history of other diseases of the circulatory system     History of hypertension    Personal history of other diseases of the circulatory system     History of hypertrophic cardiomyopathy    Personal history of other diseases of the digestive system     History of cholelithiasis    Personal history of other diseases of the digestive system     History of hemorrhoids    Personal history of other diseases of the digestive system     History of hiatal hernia    Personal history of other diseases of the digestive system     History of esophageal reflux    Personal history of other diseases of the female genital tract 01/14/2020    History of breast pain    Personal history of other endocrine, nutritional and metabolic disease     History of hypercholesterolemia    Personal history of other endocrine, nutritional and metabolic disease 03/24/2016    History of Cushing's syndrome    Personal history of transient ischemic attack (TIA), and cerebral infarction without residual deficits     History of transient cerebral ischemia    Unspecified convulsions (Multi)     Generalized convulsive seizure   [2]   Allergies  Allergen Reactions    Adhesive Tape-Silicones Unknown    Cortisone Swelling    Diphenhydramine Hallucinations and Other     \"goes out of head\"    Latex Unknown    Morphine Unknown   [3] anastrozole, 1 mg, oral, Daily  aspirin, 81 mg, oral, Daily  atorvastatin, 10 mg, oral, Nightly  cefTRIAXone, 1 g, intravenous, q24h  enoxaparin, 40 mg, subcutaneous, q24h  levETIRAcetam, 500 mg, oral, BID  metoprolol succinate XL, 100 mg, oral, Daily  QUEtiapine, 12.5 mg, oral, BID  sennosides-docusate sodium, 2 tablet, oral, BID  sertraline, 25 mg, oral, Daily  valsartan, 320 mg, oral, Daily  [4]    [5] PRN medications: acetaminophen  [6] "   Family History  Problem Relation Name Age of Onset    Lung cancer Father      Other (Carcinoma of the pancreas) Sister      Kidney cancer Sister      Arrhythmia Brother          ALl 3 Brothers     Other (Parkinson disease) Brother      Prostate cancer Brother

## 2025-08-05 LAB
ALBUMIN SERPL BCP-MCNC: 3.6 G/DL (ref 3.4–5)
ANION GAP SERPL CALC-SCNC: 12 MMOL/L (ref 10–20)
AORTIC VALVE PEAK VELOCITY: 1.45 M/S
AV PEAK GRADIENT: 8 MMHG
AVA (PEAK VEL): 2.88 CM2
BASOPHILS # BLD AUTO: 0.03 X10*3/UL (ref 0–0.1)
BASOPHILS NFR BLD AUTO: 0.4 %
BUN SERPL-MCNC: 15 MG/DL (ref 6–23)
CALCIUM SERPL-MCNC: 10.5 MG/DL (ref 8.6–10.6)
CHLORIDE SERPL-SCNC: 107 MMOL/L (ref 98–107)
CO2 SERPL-SCNC: 27 MMOL/L (ref 21–32)
CREAT SERPL-MCNC: 0.52 MG/DL (ref 0.5–1.05)
EGFRCR SERPLBLD CKD-EPI 2021: 89 ML/MIN/1.73M*2
EJECTION FRACTION: 73 %
EOSINOPHIL # BLD AUTO: 0.09 X10*3/UL (ref 0–0.4)
EOSINOPHIL NFR BLD AUTO: 1.2 %
ERYTHROCYTE [DISTWIDTH] IN BLOOD BY AUTOMATED COUNT: 13.6 % (ref 11.5–14.5)
GLUCOSE SERPL-MCNC: 89 MG/DL (ref 74–99)
HCT VFR BLD AUTO: 41.9 % (ref 36–46)
HGB BLD-MCNC: 13.4 G/DL (ref 12–16)
IMM GRANULOCYTES # BLD AUTO: 0.03 X10*3/UL (ref 0–0.5)
IMM GRANULOCYTES NFR BLD AUTO: 0.4 % (ref 0–0.9)
LEFT ATRIUM VOLUME AREA LENGTH INDEX BSA: 30.6 ML/M2
LEFT VENTRICLE INTERNAL DIMENSION DIASTOLE: 3.4 CM (ref 3.5–6)
LEFT VENTRICULAR OUTFLOW TRACT DIAMETER: 2 CM
LYMPHOCYTES # BLD AUTO: 2.04 X10*3/UL (ref 0.8–3)
LYMPHOCYTES NFR BLD AUTO: 27.6 %
MAGNESIUM SERPL-MCNC: 1.92 MG/DL (ref 1.6–2.4)
MCH RBC QN AUTO: 31.9 PG (ref 26–34)
MCHC RBC AUTO-ENTMCNC: 32 G/DL (ref 32–36)
MCV RBC AUTO: 100 FL (ref 80–100)
MITRAL VALVE E/A RATIO: 0.66
MONOCYTES # BLD AUTO: 0.64 X10*3/UL (ref 0.05–0.8)
MONOCYTES NFR BLD AUTO: 8.6 %
NEUTROPHILS # BLD AUTO: 4.57 X10*3/UL (ref 1.6–5.5)
NEUTROPHILS NFR BLD AUTO: 61.8 %
NRBC BLD-RTO: 0 /100 WBCS (ref 0–0)
PHOSPHATE SERPL-MCNC: 2.6 MG/DL (ref 2.5–4.9)
PLATELET # BLD AUTO: 190 X10*3/UL (ref 150–450)
POTASSIUM SERPL-SCNC: 3.7 MMOL/L (ref 3.5–5.3)
RBC # BLD AUTO: 4.2 X10*6/UL (ref 4–5.2)
RIGHT VENTRICLE FREE WALL PEAK S': 11 CM/S
SODIUM SERPL-SCNC: 142 MMOL/L (ref 136–145)
WBC # BLD AUTO: 7.4 X10*3/UL (ref 4.4–11.3)

## 2025-08-05 PROCEDURE — 83735 ASSAY OF MAGNESIUM: CPT

## 2025-08-05 PROCEDURE — 85025 COMPLETE CBC W/AUTO DIFF WBC: CPT

## 2025-08-05 PROCEDURE — 80069 RENAL FUNCTION PANEL: CPT

## 2025-08-05 PROCEDURE — 2500000004 HC RX 250 GENERAL PHARMACY W/ HCPCS (ALT 636 FOR OP/ED)

## 2025-08-05 PROCEDURE — 2500000002 HC RX 250 W HCPCS SELF ADMINISTERED DRUGS (ALT 637 FOR MEDICARE OP, ALT 636 FOR OP/ED)

## 2025-08-05 PROCEDURE — 36415 COLL VENOUS BLD VENIPUNCTURE: CPT

## 2025-08-05 PROCEDURE — 1200000002 HC GENERAL ROOM WITH TELEMETRY DAILY

## 2025-08-05 PROCEDURE — 2500000001 HC RX 250 WO HCPCS SELF ADMINISTERED DRUGS (ALT 637 FOR MEDICARE OP)

## 2025-08-05 PROCEDURE — 99232 SBSQ HOSP IP/OBS MODERATE 35: CPT | Performed by: STUDENT IN AN ORGANIZED HEALTH CARE EDUCATION/TRAINING PROGRAM

## 2025-08-05 PROCEDURE — 99221 1ST HOSP IP/OBS SF/LOW 40: CPT | Performed by: STUDENT IN AN ORGANIZED HEALTH CARE EDUCATION/TRAINING PROGRAM

## 2025-08-05 PROCEDURE — 0H51XZZ DESTRUCTION OF FACE SKIN, EXTERNAL APPROACH: ICD-10-PCS | Performed by: STUDENT IN AN ORGANIZED HEALTH CARE EDUCATION/TRAINING PROGRAM

## 2025-08-05 RX ADMIN — ASPIRIN 81 MG: 81 TABLET, COATED ORAL at 08:57

## 2025-08-05 RX ADMIN — ENOXAPARIN SODIUM 40 MG: 100 INJECTION SUBCUTANEOUS at 20:07

## 2025-08-05 RX ADMIN — SERTRALINE HYDROCHLORIDE 25 MG: 25 TABLET ORAL at 08:58

## 2025-08-05 RX ADMIN — VALSARTAN 320 MG: 160 TABLET, FILM COATED ORAL at 08:58

## 2025-08-05 RX ADMIN — LEVETIRACETAM 500 MG: 500 TABLET, FILM COATED ORAL at 08:57

## 2025-08-05 RX ADMIN — ANASTROZOLE 1 MG: 1 TABLET ORAL at 08:57

## 2025-08-05 RX ADMIN — CEFTRIAXONE 1 G: 1 INJECTION, SOLUTION INTRAVENOUS at 12:38

## 2025-08-05 RX ADMIN — METOPROLOL SUCCINATE 100 MG: 100 TABLET, EXTENDED RELEASE ORAL at 09:05

## 2025-08-05 RX ADMIN — QUETIAPINE FUMARATE 12.5 MG: 25 TABLET ORAL at 20:06

## 2025-08-05 RX ADMIN — SENNOSIDES AND DOCUSATE SODIUM 2 TABLET: 50; 8.6 TABLET ORAL at 20:06

## 2025-08-05 RX ADMIN — SENNOSIDES AND DOCUSATE SODIUM 2 TABLET: 50; 8.6 TABLET ORAL at 08:57

## 2025-08-05 RX ADMIN — LEVETIRACETAM 500 MG: 500 TABLET, FILM COATED ORAL at 20:06

## 2025-08-05 RX ADMIN — QUETIAPINE FUMARATE 12.5 MG: 25 TABLET ORAL at 08:57

## 2025-08-05 RX ADMIN — ATORVASTATIN CALCIUM 10 MG: 20 TABLET, FILM COATED ORAL at 20:06

## 2025-08-05 ASSESSMENT — COGNITIVE AND FUNCTIONAL STATUS - GENERAL
PERSONAL GROOMING: A LITTLE
DRESSING REGULAR UPPER BODY CLOTHING: A LITTLE
EATING MEALS: A LITTLE
DAILY ACTIVITIY SCORE: 18
WALKING IN HOSPITAL ROOM: A LITTLE
DRESSING REGULAR LOWER BODY CLOTHING: A LITTLE
TOILETING: A LITTLE
MOBILITY SCORE: 20
STANDING UP FROM CHAIR USING ARMS: A LITTLE
CLIMB 3 TO 5 STEPS WITH RAILING: A LITTLE
HELP NEEDED FOR BATHING: A LITTLE
MOVING TO AND FROM BED TO CHAIR: A LITTLE

## 2025-08-05 ASSESSMENT — ACTIVITIES OF DAILY LIVING (ADL): LACK_OF_TRANSPORTATION: NO

## 2025-08-05 ASSESSMENT — PAIN SCALES - GENERAL
PAINLEVEL_OUTOF10: 0 - NO PAIN
PAINLEVEL_OUTOF10: 0 - NO PAIN

## 2025-08-05 ASSESSMENT — PAIN - FUNCTIONAL ASSESSMENT: PAIN_FUNCTIONAL_ASSESSMENT: 0-10

## 2025-08-05 NOTE — PROCEDURES
General    Date/Time: 8/5/2025 1:18 PM    Performed by: Valerie Haddad MD  Authorized by: Bernabe Bustamante MD    Consent:     Consent obtained:  Verbal    Consent given by:  Patient    Risks, benefits, and alternatives were discussed: yes      Risks discussed:  Poor cosmetic result    Alternatives discussed:  No treatment  Universal protocol:     Procedure explained and questions answered to patient or proxy's satisfaction: yes      Patient identity confirmed:  Verbally with patient  Procedure specific details:      - Removal of seborrheic keratosis of the right cheek today after risks/benefits reviewed including pain, redness, swelling, blisters, scar, recurrence, imcomplete removal, dyspigmentation. Verbal informed consent obtained. The area was treated with cryotherapy x 2 cycles using a technique to minimize spread to the surrounding uninvolved skin. Patient tolerated the proedure well. Wound care was reviewed.   Post-procedure details:     Procedure completion:  Tolerated well, no immediate complications      I was present during all critical and key portions of the procedure(s) and immediately available to furnish services the entire duration.  See resident note for details.   Bernabe Bustamante MD

## 2025-08-05 NOTE — PROGRESS NOTES
Ann Marie Murphy is a 89 y.o. female on day 3 of admission presenting with NSTEMI (non-ST elevated myocardial infarction) (Multi).      Subjective   No acute events overnight. She feels weak today but denies any dizziness,  chest pain, nausea, or vomiting.     Objective     Last Recorded Vitals  BP 85/54   Pulse 72   Temp 35.9 °C (96.6 °F)   Resp 17   Wt 54.4 kg (120 lb)   SpO2 93%   Intake/Output last 3 Shifts:    Intake/Output Summary (Last 24 hours) at 8/5/2025 1836  Last data filed at 8/5/2025 1708  Gross per 24 hour   Intake 529.17 ml   Output 1500 ml   Net -970.83 ml       Admission Weight  Weight: 54.4 kg (120 lb) (08/02/25 1126)    Daily Weight  08/02/25 : 54.4 kg (120 lb)    Image Results  Transthoracic Echo Complete     [facility] (formerly [facility]), [address] [place] [address]                 Tel [phone] and Fax [phone]    TRANSTHORACIC ECHOCARDIOGRAM REPORT       Patient Name:       ANN MARIE MURPHY  Reading Physician:    [id number] Jacquie Hdz MD  Study Date:         8/4/2025            Ordering Provider:    [id number] ZOHREH RODRIGUEZ  MRN/PID:            [id number]            Fellow:  Accession#:         [id number]        Nurse:                Nayely Cooley RN  Date of Birth/Age:  1935 / 89      Sonographer:          Nathaly duron RDCS  Gender assigned at  F                   Additional Staff:  Birth:  Height:             152.40 cm           Admit Date:  Weight:             54.43 kg            Admission Status:     Inpatient -                                                                Routine  BSA / BMI:          1.50 m2 / 23.44     Encounter#:           [id number]                      kg/m2  Blood Pressure:     134/69 mmHg         Department Location:  [facility]                                                                 Non Invasive    Study Type:    TRANSTHORACIC ECHO (TTE) COMPLETE  Diagnosis/ICD: Syncope-R55  Indication:    Syncope  CPT Code:      Echo Complete w Full [id number]    Patient History:  MI Location/Type:  Non-ST Elevation MI  Pertinent History: HTN, Hyperlipidemia and CVA. HOCM< LVH, Anemia, GERD.    Study Detail: The following Echo studies were performed: color flow, Doppler,                M-Mode and 2D. Technically challenging study due to body habitus                and Dementia. Optison used as a contrast agent for endocardial                border definition. Total contrast used for this procedure was 3.5                mL via IV push.       PHYSICIAN INTERPRETATION:  Left Ventricle: Left ventricular ejection fraction is normal by visual estimate at 70-75%. There is severe concentric left ventricular hypertrophy. There are no regional left ventricular wall motion abnormalities. The left ventricular cavity size is decreased. There is severely increased septal and moderately increased posterior left ventricular wall thickness. Spectral Doppler shows a Grade I (impaired relaxation pattern) of left ventricular diastolic filling with normal left atrial filling pressure.  Left Atrium: The left atrium is mildly dilated.  Right Ventricle: The right ventricle is normal in size. There is normal right ventricular global systolic function.  Right Atrium: The right atrium is normal in size.  Aortic Valve: The aortic valve is trileaflet. There is mild aortic valve thickening. There is no evidence of aortic valve regurgitation.  Mitral Valve: The mitral valve is mildly thickened. There is moderate mitral annular calcification. There is trace mitral valve regurgitation. The E Vmax is 0.73 m/s.  Tricuspid Valve: The tricuspid valve is structurally normal. There is trace tricuspid regurgitation.  Pulmonic Valve: The pulmonic valve is structurally normal. There is no indication of  pulmonic valve regurgitation.  Pericardium: Small pericardial effusion.  Aorta: The aortic root is normal. The Asc Ao is 3.20 cm. There is no dilatation of the ascending aorta.  Systemic Veins: The inferior vena cava appears small in size, with IVC inspiratory collapse greater than 50%.  In comparison to the previous echocardiogram(s): Compared with study dated 2/12/2020, no significant change.       CONCLUSIONS:   1. Left ventricular ejection fraction is normal by visual estimate at 70-75%.   2. Spectral Doppler shows a Grade I (impaired relaxation pattern) of left ventricular diastolic filling with normal left atrial filling pressure.   3. Left ventricular cavity size is decreased.   4. There is severely increased septal and moderately increased posterior left ventricular wall thickness.   5. There is severe concentric left ventricular hypertrophy.   6. There is normal right ventricular global systolic function.   7. The left atrium is mildly dilated.   8. Small pericardial effusion.   9. There is moderate mitral annular calcification.    RECOMMENDATIONS:  Utilizing an FDA cleared automated machine learning algorithm (EchoGo Heart Failure by [employer]), the analysis of the apical 4-chamber echocardiogram suggests the presence of heart failure with preserved ejection fraction (HFpEF)*. Clinical correlation looking for additional heart failure signs and symptoms is recommended, as a definite diagnosis of heart failure cannot be made by imaging alone.  *Per ACC/AHA/HFSA universal diagnosis of heart failure, HFpEF is defined as 1) signs and symptoms leading to clinical diagnosis of heart failure, 2) an ejection fraction of at least 50%, and 3) evidence of elevated intra-cardiac filling pressures by echocardiography, BNP elevation, or catheterization.     QUANTITATIVE DATA SUMMARY:     2D MEASUREMENTS:          Normal Ranges:  Ao Root d:       3.40 cm  (2.0-3.7cm)  LAs:             3.45 cm  (2.7-4.0cm)  IVSd:             1.60 cm  (0.6-1.1cm)  LVPWd:           1.50 cm  (0.6-1.1cm)  LVIDd:           3.40 cm  (3.9-5.9cm)  LVIDs:           1.70 cm  LV Mass Index:   131 g/m2  LV % FS          50.0 %       LEFT ATRIUM:                  Normal Ranges:  LA Vol A4C:        35.0 ml    (22+/-6mL/m2)  LA Vol A2C:        53.8 ml  LA Vol BP:         45.9 ml  LA Vol Index A4C:  23.3ml/m2  LA Vol Index A2C:  35.8 ml/m2  LA Vol Index BP:   30.6 ml/m2  LA Area A4C:       14.5 cm2  LA Area A2C:       19.0 cm2  LA Major Axis A4C: 5.1 cm  LA Major Axis A2C: 5.7 cm  LA Volume Index:   35.8 ml/m2       AORTA MEASUREMENTS:         Normal Ranges:  Asc Ao, d:          3.20 cm (2.1-3.4cm)       LV SYSTOLIC FUNCTION:                       Normal Ranges:  EF-Visual:      73 %  LV EF Reported: 73 %       LV DIASTOLIC FUNCTION:             Normal Ranges:  MV Peak E:             0.73 m/s    (0.7-1.2 m/s)  MV Peak A:             1.11 m/s    (0.42-0.7 m/s)  E/A Ratio:             0.66        (1.0-2.2)  MV e'                  0.082 m/s   (>8.0)  MV lateral e'          0.12 m/s  MV medial e'           0.05 m/s  MV A Dur:              144.00 msec  E/e' Ratio:            8.82        (<8.0)  MV DT:                 250 msec    (150-240 msec)       MITRAL VALVE:          Normal Ranges:  MV DT:        250 msec (150-240msec)       AORTIC VALVE:           Normal Ranges:  AoV Vmax:      1.45 m/s (<=1.7m/s)  AoV Peak P.4 mmHg (<20mmHg)  LVOT Max Eugene:  1.33 m/s (<=1.1m/s)  LVOT VTI:      29.30 cm  LVOT Diameter: 2.00 cm  (1.8-2.4cm)  AoV Area,Vmax: 2.88 cm2 (2.5-4.5cm2)       RIGHT VENTRICLE:  RV Basal 3.10 cm  RV Mid   1.70 cm  RV Major 8.3 cm  RV s'    0.11 m/s       [id number] Jacquie Hdz MD  Electronically signed on 2025 at 7:52:05 AM       ** Final **      Physical Exam  Constitutional:       General: She is not in acute distress.     Appearance: She is not toxic-appearing.   HENT:      Head: Normocephalic and atraumatic.      Mouth/Throat:      Mouth: Mucous  membranes are moist.      Pharynx: No oropharyngeal exudate.     Eyes:      General: No scleral icterus.     Pupils: Pupils are equal, round, and reactive to light.       Cardiovascular:      Rate and Rhythm: Normal rate and regular rhythm.      Pulses: Normal pulses.      Heart sounds: No murmur heard.  Pulmonary:      Effort: Pulmonary effort is normal. No respiratory distress.      Breath sounds: No wheezing.   Abdominal:      General: Abdomen is flat. There is no distension.      Tenderness: There is no abdominal tenderness.     Musculoskeletal:      Right lower leg: No edema.      Left lower leg: No edema.     Skin:     General: Skin is warm.      Coloration: Skin is not jaundiced.     Neurological:      General: No focal deficit present.      Mental Status: She is oriented to person, place, and time. Mental status is at baseline.     Psychiatric:         Mood and Affect: Mood normal.         Behavior: Behavior normal.         Thought Content: Thought content normal.         Relevant Results    Scheduled medications  Scheduled Medications[1]  Continuous medications  Continuous Medications[2]  PRN medications  PRN Medications[3]     Results for orders placed or performed during the hospital encounter of 08/02/25 (from the past 24 hours)   CBC and Auto Differential   Result Value Ref Range    WBC 7.4 4.4 - 11.3 x10*3/uL    nRBC 0.0 0.0 - 0.0 /100 WBCs    RBC 4.20 4.00 - 5.20 x10*6/uL    Hemoglobin 13.4 12.0 - 16.0 g/dL    Hematocrit 41.9 36.0 - 46.0 %     80 - 100 fL    MCH 31.9 26.0 - 34.0 pg    MCHC 32.0 32.0 - 36.0 g/dL    RDW 13.6 11.5 - 14.5 %    Platelets 190 150 - 450 x10*3/uL    Neutrophils % 61.8 40.0 - 80.0 %    Immature Granulocytes %, Automated 0.4 0.0 - 0.9 %    Lymphocytes % 27.6 13.0 - 44.0 %    Monocytes % 8.6 2.0 - 10.0 %    Eosinophils % 1.2 0.0 - 6.0 %    Basophils % 0.4 0.0 - 2.0 %    Neutrophils Absolute 4.57 1.60 - 5.50 x10*3/uL    Immature Granulocytes Absolute, Automated 0.03 0.00 -  0.50 x10*3/uL    Lymphocytes Absolute 2.04 0.80 - 3.00 x10*3/uL    Monocytes Absolute 0.64 0.05 - 0.80 x10*3/uL    Eosinophils Absolute 0.09 0.00 - 0.40 x10*3/uL    Basophils Absolute 0.03 0.00 - 0.10 x10*3/uL   Renal Function Panel   Result Value Ref Range    Glucose 89 74 - 99 mg/dL    Sodium 142 136 - 145 mmol/L    Potassium 3.7 3.5 - 5.3 mmol/L    Chloride 107 98 - 107 mmol/L    Bicarbonate 27 21 - 32 mmol/L    Anion Gap 12 10 - 20 mmol/L    Urea Nitrogen 15 6 - 23 mg/dL    Creatinine 0.52 0.50 - 1.05 mg/dL    eGFR 89 >60 mL/min/1.73m*2    Calcium 10.5 8.6 - 10.6 mg/dL    Phosphorus 2.6 2.5 - 4.9 mg/dL    Albumin 3.6 3.4 - 5.0 g/dL   Magnesium   Result Value Ref Range    Magnesium 1.92 1.60 - 2.40 mg/dL                    Assessment & Plan  NSTEMI (non-ST elevated myocardial infarction) (Multi)    Pt is a 89 y.o. female with hx of right-sided invasive ductal carcinoma, clinical stage IB (T2 N0 M0),  dementia,  hypertension, HLD, seizure disorder who presents after an unwitnessed fall last night. Patient was admitted for further workup after syncope, fall and tropenmia.     #Syncope  #Fall  - Ddx: Orthostasis 2/2 hypovolemia in setting of UTI vs Vasovagal vs Cardiac/Structural - imaging unremarkable including CTH, cervical spine, maxillofacial bones w/o con, and XR pelvis   - ECHO concerning severely increased septal and moderately increased posterior left ventricular wall thickness.   Plan  - consider cards consult in the AM for increased wall thickness  - PT-Rec SNF  - Tylenol prn for pain   - Orthostats in the AM      #Uncomplicated UTI  -Ucx-Clinically insignificant growth based on current clinical standards.   -CTX x 3 days       #NSTEMI likely Type 2   -EKG NSR w/o ST elevation   - A1C 5.6%,   - trops 150 -> 159 -> 154, will stop trending  PLAN:  -Stat EKG and ACS workup/management if endorsing chest pain  - C/h lipitor 10 mg and ASA 81 mg daily   - C/h metop succinate 100 mg daily      #Chronic  conditions  - HTN: c/h valsartan 320 mg daily   - Dementia: c/h sertraline 25 mg and seroquel 12.5 mg daily   - Seizure disorder: c/h keppra 500 mg BID   - H/o breast carcinoma in remission: c/h anastrozole      F : PRN  E: PRN  N: Adult diet Regular  A:  PIV  DVT prophylaxis: Lovenox subq     Code Status: Full Code (confirmed on admission)   NOK: Extended Emergency Contact Information  Primary Emergency Contact: Mary Sebastian  Home Phone: [phone]  Mobile Phone: [phone]  Relation: Daughter  Secondary Emergency Contact: BarryMicah   [place]  Mobile Phone: [phone]  Relation: Daughter     DISPO: SNF medical optimization   FOLLOW UPS: PCP    Christine Byrd MD           [1] anastrozole, 1 mg, oral, Daily  aspirin, 81 mg, oral, Daily  atorvastatin, 10 mg, oral, Nightly  cefTRIAXone, 1 g, intravenous, q24h  enoxaparin, 40 mg, subcutaneous, q24h  levETIRAcetam, 500 mg, oral, BID  metoprolol succinate XL, 100 mg, oral, Daily  QUEtiapine, 12.5 mg, oral, BID  sennosides-docusate sodium, 2 tablet, oral, BID  sertraline, 25 mg, oral, Daily  valsartan, 320 mg, oral, Daily  [2]    [3] PRN medications: acetaminophen

## 2025-08-05 NOTE — CARE PLAN
Problem: Safety - Adult  Goal: Free from fall injury  Outcome: Progressing     Problem: Pain - Adult  Goal: Verbalizes/displays adequate comfort level or baseline comfort level  Outcome: Progressing     Problem: Discharge Planning  Goal: Discharge to home or other facility with appropriate resources  Outcome: Progressing     Problem: Chronic Conditions and Co-morbidities  Goal: Patient's chronic conditions and co-morbidity symptoms are monitored and maintained or improved  Outcome: Progressing     Problem: Nutrition  Goal: Nutrient intake appropriate for maintaining nutritional needs  Outcome: Progressing   The patient's goals for the shift include      The clinical goals for the shift include patient will be free from injury and falls.    Over the shift, the patient did not make progress toward the following goals. Barriers to progression include . Recommendations to address these barriers include .

## 2025-08-05 NOTE — NURSING NOTE
Patient admitted to [place] [address] from the ER earlier this evening. She was oriented to the environment and call light system but is confused at times so needs reinforcement. A patient observer is currently at the bedside. She is on the telemetry monitor per MD order. Her admission screenings were completed.

## 2025-08-05 NOTE — CARE PLAN
The patient's goals for the shift include patient sleeps at least 6 hours throughout shift     The clinical goals for the shift include patient will be free from injury and falls.    Problem: Safety - Adult  Goal: Free from fall injury  8/4/2025 2332 by Jessica Nesbitt RN  Outcome: Progressing  8/4/2025 2332 by Jessica Nesbitt RN  Outcome: Progressing     Problem: Nutrition  Goal: Nutrient intake appropriate for maintaining nutritional needs  8/4/2025 2332 by Jessica Nesbitt RN  Outcome: Progressing  8/4/2025 2332 by Jessica Nesbitt RN  Outcome: Progressing     Problem: Fall/Injury  Goal: Not fall by end of shift  Outcome: Progressing  Goal: Be free from injury by end of the shift  Outcome: Progressing  Goal: Verbalize understanding of personal risk factors for fall in the hospital  Outcome: Progressing  Goal: Verbalize understanding of risk factor reduction measures to prevent injury from fall in the home  Outcome: Progressing  Goal: Use assistive devices by end of the shift  Outcome: Progressing  Goal: Pace activities to prevent fatigue by end of the shift  Outcome: Progressing

## 2025-08-05 NOTE — CONSULTS
Nutrition Consult Note    Nutrition Assessment         Patient is a 89 y.o. female with a history of right-sided invasive ductal carcinoma, clinical stage IB (T2 N0 M0), dementia, hypertension, HLD, seizure disorder who presents after an unwitnessed fall last night. Patient was admitted for further workup after syncope, fall and tropenmia. Given patient is without typical and atypical chest pain Tropenima is likely related to Type 2 NSTEMI ( Demand/Supply Mismatch). Patient reporting urinary frequency/urgency with positive UA likely related to UTI. Syncope possibly related to Orthostasis 2/2 hypovolemia from UTI, Vasovagal vs Cardiac/structural       Nutrition History:  Energy Intake: Poor < 50 %  Food and Nutrient History: Nutrition consult per Nursing Screen (MST score 2 ) completed by remote RD assessment. Patieint states that her appetite was not good at home, it became so challening that she would only eat small portions 2-3 times/day.  She lives in an apartment with her family who assists her with grocery shopping and meal preparation due to inability to complete these ADL's.  She is unable to recall what she had for lunch today, she thinks it was a rice/bean casserole.  Briefly reviewed [facility] patient our goal to try and keep her as strong as we can during admission through her meals/nutrition.  She is agreeable to a strawberry Ensure Max TID       Anthropometrics:  Height: (!) 152.4 cm (5')   Weight: 54.4 kg (120 lb)   BMI (Calculated): 23.44  IBW/kg (Dietitian Calculated): 45.5 kg      Weight History:   Wt Readings from Last 10 Encounters:   08/02/25 54.4 kg (120 lb)   04/19/25 54.4 kg (120 lb)   02/10/25 56.2 kg (124 lb)   01/23/25 57.9 kg (127 lb 10.3 oz)   10/25/24 51.1 kg (112 lb 12.2 oz)   08/26/24 55.1 kg (121 lb 9 oz)   07/26/24 54.9 kg (121 lb 2.3 oz)   04/12/24 55.7 kg (122 lb 11 oz)   01/18/24 56 kg (123 lb 6.4 oz)   10/27/23 54.9 kg (121 lb)       Weight Change %:  Weight History / % Weight Change:  Per chart review and weight history patients weight has been stable over the last 2 years  Significant Weight Loss: No    Nutrition Focused Physical Exam Findings:    Subcutaneous Fat Loss:   Defer Subcutaneous Fat Loss Assessment: Defer all  Defer All Reason: Unable to complete due to remote RD assessment  Muscle Wasting:  Defer Muscle Wasting Assessment: Defer all  Defer All Reason: Unable to complete due to remote RD assessment  Edema:     Physical Findings:       Nutrition Significant Labs:  BMP Trend:   Results from last 7 days   Lab Units 08/05/25  0930 08/02/25  1146   GLUCOSE mg/dL 89 103*   CALCIUM mg/dL 10.5 10.0   SODIUM mmol/L 142 141   POTASSIUM mmol/L 3.7 4.2   CO2 mmol/L 27 24   CHLORIDE mmol/L 107 110*   BUN mg/dL 15 15   CREATININE mg/dL 0.52 0.59    , Vit D:   Lab Results   Component Value Date    VITD25 51 09/12/2022    , Vit B12:   Lab Results   Component Value Date    ECJIWBDZ36 1,545 (H) 04/21/2025        Nutrition Specific Medications:  Scheduled medications  Scheduled Medications[1]  Continuous medications  Continuous Medications[2]  PRN medications  PRN Medications[3]      I/O:    ;      Dietary Orders (From admission, onward)       Start     Ordered    08/04/25 2056  May Participate in Room Service  ( ROOM SERVICE MAY PARTICIPATE)  Once        Question:  .  Answer:  Yes    08/04/25 2055 08/02/25 1638  Adult diet Regular  Diet effective now        Question:  Diet type  Answer:  Regular    08/02/25 1637                     Estimated Needs:   Total Energy Estimated Needs in 24 hours (kCal): 1362 kCal  Method for Estimating Needs: 25kcals/kg CBW  Total Protein Estimated Needs in 24 Hours (g): 55 g  Method for Estimating 24 Hour Protein Needs: 1.2grams/kg IBW  Total Fluid Estimated Needs in 24 Hours (mL): 1362 mL  Method for Estimating 24 Hour Fluid Needs: 1mL/kcal or per physician order        Nutrition Diagnosis   Malnutrition Diagnosis  Patient has Malnutrition Diagnosis: No    Nutrition  Diagnosis  Patient has Nutrition Diagnosis: Yes  Diagnosis Status (1): New  Nutrition Diagnosis 1: Inadequate energy intake  Related to (1): poor appetite in setting fo acute illness/fatigue and dementia  As Evidenced by (1): patient report, diet recall.       Nutrition Interventions/Recommendations   Nutrition prescription for oral nutrition    Nutrition Recommendations:  Individualized Nutrition Prescription Provided for : Recommend Regular diet as ordered by Physician with Ensure Max TID    Nutrition Interventions/Goals:   Meals and Snacks: General healthful diet  Goal: Intake greater than 50% of meals TID  Medical Food Supplement: Commercial beverage medical food supplement therapy  Goal: Patient will consume 100% of 2-3 Ensure Max Suppelments TID (150 calroies/30grams protein each)      Education Documentation  No documentation found.            Nutrition Monitoring and Evaluation   Intake / Amount of food: Consumes at least 50% or more of meals/snacks/supplements                   Goal Status: New goal(s) identified             Last Date of Nutrition Visit: 08/05/25  Nutrition Follow-Up Needed?: Dietitian to reassess per policy  Follow up Comment: Diet/ONS      08/05/25 at 2:47 PM - JEFFERY GATES RDN, LD              [1] anastrozole, 1 mg, oral, Daily  aspirin, 81 mg, oral, Daily  atorvastatin, 10 mg, oral, Nightly  cefTRIAXone, 1 g, intravenous, q24h  enoxaparin, 40 mg, subcutaneous, q24h  levETIRAcetam, 500 mg, oral, BID  metoprolol succinate XL, 100 mg, oral, Daily  QUEtiapine, 12.5 mg, oral, BID  sennosides-docusate sodium, 2 tablet, oral, BID  sertraline, 25 mg, oral, Daily  valsartan, 320 mg, oral, Daily     [2]    [3] PRN medications: acetaminophen

## 2025-08-05 NOTE — PROGRESS NOTES
08/05/25 1119   Rapid Rounds   Attendance Provider;Care Transitions   Expected Discharge Disposition SNF   Today we still await: Clinical stability   Review at Escalation Rounds No escalation needed        08/05/25 1242   Discharge Planning   Living Arrangements Alone   Support Systems Children;Family members   Assistance Needed uses a walker for ambulation assistance   Type of Residence Private residence   Home or Post Acute Services Post acute facilities (Rehab/SNF/etc)   Expected Discharge Disposition SNF   Does the patient need discharge transport arranged? Yes   [facility] coordination needed? Yes   Has discharge transport been arranged? No   Financial Resource Strain   How hard is it for you to pay for the very basics like food, housing, medical care, and heating? Not very   Housing Stability   In the last 12 months, was there a time when you were not able to pay the mortgage or rent on time? N   In the past 12 months, how many times have you moved where you were living? 0   At any time in the past 12 months, were you homeless or living in a shelter (including now)? N   Transportation Needs   In the past 12 months, has lack of transportation kept you from medical appointments or from getting medications? no   In the past 12 months, has lack of transportation kept you from meetings, work, or from getting things needed for daily living? No     Plan per Medical/Surgical team: Patient is admitted for syncope and fall. Patient is also being treated for a UTI. Dermatology consulted for lesions on her face. PT/OT will evaluate for discharge needs.    Demographics/Insurance: verified.  Living Environment: Patient lives alone but has support from her daughters.    Home Care: denies  PCP: Dr. Heriberto Lopes  Pharmacy: [facility]  DME: walker  Falls: fell prior to admission  Dialysis: denies  Social Work Needs: PT/OT to evaluate for SNF placement. Daughter would like a referral sent to [facility].  Transportation  at discharge: Family can provide transportation home.    Potential Barriers: none  Discharge Disposition: SNF  ADOD:  1-3 days      Malu Washington RN, BSN  Transitional Care Coordinator

## 2025-08-06 LAB
ALBUMIN SERPL BCP-MCNC: 3.7 G/DL (ref 3.4–5)
ANION GAP SERPL CALC-SCNC: 11 MMOL/L (ref 10–20)
BASOPHILS # BLD AUTO: 0.03 X10*3/UL (ref 0–0.1)
BASOPHILS NFR BLD AUTO: 0.4 %
BUN SERPL-MCNC: 14 MG/DL (ref 6–23)
CALCIUM SERPL-MCNC: 10.3 MG/DL (ref 8.6–10.6)
CHLORIDE SERPL-SCNC: 109 MMOL/L (ref 98–107)
CO2 SERPL-SCNC: 27 MMOL/L (ref 21–32)
CREAT SERPL-MCNC: 0.54 MG/DL (ref 0.5–1.05)
EGFRCR SERPLBLD CKD-EPI 2021: 88 ML/MIN/1.73M*2
EOSINOPHIL # BLD AUTO: 0.1 X10*3/UL (ref 0–0.4)
EOSINOPHIL NFR BLD AUTO: 1.3 %
ERYTHROCYTE [DISTWIDTH] IN BLOOD BY AUTOMATED COUNT: 13.3 % (ref 11.5–14.5)
GLUCOSE SERPL-MCNC: 98 MG/DL (ref 74–99)
HCT VFR BLD AUTO: 39.4 % (ref 36–46)
HGB BLD-MCNC: 12.9 G/DL (ref 12–16)
IMM GRANULOCYTES # BLD AUTO: 0.02 X10*3/UL (ref 0–0.5)
IMM GRANULOCYTES NFR BLD AUTO: 0.3 % (ref 0–0.9)
LYMPHOCYTES # BLD AUTO: 2.19 X10*3/UL (ref 0.8–3)
LYMPHOCYTES NFR BLD AUTO: 28.6 %
MAGNESIUM SERPL-MCNC: 1.96 MG/DL (ref 1.6–2.4)
MCH RBC QN AUTO: 32.3 PG (ref 26–34)
MCHC RBC AUTO-ENTMCNC: 32.7 G/DL (ref 32–36)
MCV RBC AUTO: 99 FL (ref 80–100)
MONOCYTES # BLD AUTO: 0.63 X10*3/UL (ref 0.05–0.8)
MONOCYTES NFR BLD AUTO: 8.2 %
NEUTROPHILS # BLD AUTO: 4.7 X10*3/UL (ref 1.6–5.5)
NEUTROPHILS NFR BLD AUTO: 61.2 %
NRBC BLD-RTO: 0 /100 WBCS (ref 0–0)
PHOSPHATE SERPL-MCNC: 2.8 MG/DL (ref 2.5–4.9)
PLATELET # BLD AUTO: 198 X10*3/UL (ref 150–450)
POTASSIUM SERPL-SCNC: 3.6 MMOL/L (ref 3.5–5.3)
RBC # BLD AUTO: 4 X10*6/UL (ref 4–5.2)
SODIUM SERPL-SCNC: 143 MMOL/L (ref 136–145)
WBC # BLD AUTO: 7.7 X10*3/UL (ref 4.4–11.3)

## 2025-08-06 PROCEDURE — 80069 RENAL FUNCTION PANEL: CPT

## 2025-08-06 PROCEDURE — 83735 ASSAY OF MAGNESIUM: CPT

## 2025-08-06 PROCEDURE — 2500000001 HC RX 250 WO HCPCS SELF ADMINISTERED DRUGS (ALT 637 FOR MEDICARE OP)

## 2025-08-06 PROCEDURE — 36415 COLL VENOUS BLD VENIPUNCTURE: CPT

## 2025-08-06 PROCEDURE — 99233 SBSQ HOSP IP/OBS HIGH 50: CPT | Performed by: STUDENT IN AN ORGANIZED HEALTH CARE EDUCATION/TRAINING PROGRAM

## 2025-08-06 PROCEDURE — 97116 GAIT TRAINING THERAPY: CPT | Mod: GP,CQ

## 2025-08-06 PROCEDURE — 2500000004 HC RX 250 GENERAL PHARMACY W/ HCPCS (ALT 636 FOR OP/ED)

## 2025-08-06 PROCEDURE — 97165 OT EVAL LOW COMPLEX 30 MIN: CPT | Mod: GO

## 2025-08-06 PROCEDURE — 97530 THERAPEUTIC ACTIVITIES: CPT | Mod: GP,CQ

## 2025-08-06 PROCEDURE — 85025 COMPLETE CBC W/AUTO DIFF WBC: CPT

## 2025-08-06 PROCEDURE — 2500000002 HC RX 250 W HCPCS SELF ADMINISTERED DRUGS (ALT 637 FOR MEDICARE OP, ALT 636 FOR OP/ED)

## 2025-08-06 PROCEDURE — 87040 BLOOD CULTURE FOR BACTERIA: CPT | Performed by: STUDENT IN AN ORGANIZED HEALTH CARE EDUCATION/TRAINING PROGRAM

## 2025-08-06 PROCEDURE — 1210000001 HC SEMI-PRIVATE ROOM DAILY

## 2025-08-06 PROCEDURE — 36415 COLL VENOUS BLD VENIPUNCTURE: CPT | Performed by: STUDENT IN AN ORGANIZED HEALTH CARE EDUCATION/TRAINING PROGRAM

## 2025-08-06 PROCEDURE — 97530 THERAPEUTIC ACTIVITIES: CPT | Mod: GO

## 2025-08-06 RX ORDER — OLANZAPINE 10 MG/2ML
2.5 INJECTION, POWDER, FOR SOLUTION INTRAMUSCULAR ONCE AS NEEDED
Status: COMPLETED | OUTPATIENT
Start: 2025-08-06 | End: 2025-08-06

## 2025-08-06 RX ORDER — CEFTRIAXONE 1 G/50ML
1 INJECTION, SOLUTION INTRAVENOUS EVERY 24 HOURS
Status: COMPLETED | OUTPATIENT
Start: 2025-08-07 | End: 2025-08-07

## 2025-08-06 RX ADMIN — ENOXAPARIN SODIUM 40 MG: 100 INJECTION SUBCUTANEOUS at 22:58

## 2025-08-06 RX ADMIN — ANASTROZOLE 1 MG: 1 TABLET ORAL at 09:40

## 2025-08-06 RX ADMIN — CEFTRIAXONE 1 G: 1 INJECTION, SOLUTION INTRAVENOUS at 13:42

## 2025-08-06 RX ADMIN — METOPROLOL SUCCINATE 100 MG: 100 TABLET, EXTENDED RELEASE ORAL at 09:40

## 2025-08-06 RX ADMIN — SERTRALINE HYDROCHLORIDE 25 MG: 25 TABLET ORAL at 09:39

## 2025-08-06 RX ADMIN — ATORVASTATIN CALCIUM 10 MG: 20 TABLET, FILM COATED ORAL at 22:58

## 2025-08-06 RX ADMIN — LEVETIRACETAM 500 MG: 500 TABLET, FILM COATED ORAL at 22:58

## 2025-08-06 RX ADMIN — SENNOSIDES AND DOCUSATE SODIUM 2 TABLET: 50; 8.6 TABLET ORAL at 22:58

## 2025-08-06 RX ADMIN — QUETIAPINE FUMARATE 12.5 MG: 25 TABLET ORAL at 09:40

## 2025-08-06 RX ADMIN — QUETIAPINE FUMARATE 12.5 MG: 25 TABLET ORAL at 22:58

## 2025-08-06 RX ADMIN — OLANZAPINE 2.5 MG: 10 INJECTION, POWDER, LYOPHILIZED, FOR SOLUTION INTRAMUSCULAR at 04:17

## 2025-08-06 RX ADMIN — LEVETIRACETAM 500 MG: 500 TABLET, FILM COATED ORAL at 09:40

## 2025-08-06 RX ADMIN — SENNOSIDES AND DOCUSATE SODIUM 2 TABLET: 50; 8.6 TABLET ORAL at 09:40

## 2025-08-06 RX ADMIN — VALSARTAN 320 MG: 160 TABLET, FILM COATED ORAL at 09:40

## 2025-08-06 RX ADMIN — ASPIRIN 81 MG: 81 TABLET, COATED ORAL at 09:40

## 2025-08-06 ASSESSMENT — PAIN - FUNCTIONAL ASSESSMENT
PAIN_FUNCTIONAL_ASSESSMENT: 0-10
PAIN_FUNCTIONAL_ASSESSMENT: 0-10

## 2025-08-06 ASSESSMENT — COGNITIVE AND FUNCTIONAL STATUS - GENERAL
DRESSING REGULAR LOWER BODY CLOTHING: A LOT
EATING MEALS: A LITTLE
TOILETING: A LOT
MOBILITY SCORE: 20
DRESSING REGULAR UPPER BODY CLOTHING: A LITTLE
TOILETING: A LITTLE
WALKING IN HOSPITAL ROOM: A LITTLE
MOVING TO AND FROM BED TO CHAIR: A LITTLE
MOVING FROM LYING ON BACK TO SITTING ON SIDE OF FLAT BED WITH BEDRAILS: A LITTLE
WALKING IN HOSPITAL ROOM: A LOT
PERSONAL GROOMING: A LITTLE
DRESSING REGULAR UPPER BODY CLOTHING: A LITTLE
HELP NEEDED FOR BATHING: A LITTLE
STANDING UP FROM CHAIR USING ARMS: A LITTLE
PERSONAL GROOMING: A LITTLE
MOVING TO AND FROM BED TO CHAIR: A LOT
CLIMB 3 TO 5 STEPS WITH RAILING: A LITTLE
STANDING UP FROM CHAIR USING ARMS: A LOT
DRESSING REGULAR LOWER BODY CLOTHING: A LITTLE
DAILY ACTIVITIY SCORE: 15
HELP NEEDED FOR BATHING: A LOT
EATING MEALS: A LITTLE
DAILY ACTIVITIY SCORE: 18
CLIMB 3 TO 5 STEPS WITH RAILING: TOTAL
TURNING FROM BACK TO SIDE WHILE IN FLAT BAD: A LOT
MOBILITY SCORE: 12

## 2025-08-06 ASSESSMENT — ACTIVITIES OF DAILY LIVING (ADL)
ADL_ASSISTANCE: INDEPENDENT
BATHING_ASSISTANCE: MINIMAL

## 2025-08-06 ASSESSMENT — PAIN SCALES - GENERAL: PAINLEVEL_OUTOF10: 0 - NO PAIN

## 2025-08-06 NOTE — CARE PLAN
The patient's goals for the shift include      The clinical goals for the shift include pt will remain free of inuuries/falls      Problem: Pain - Adult  Goal: Verbalizes/displays adequate comfort level or baseline comfort level  Outcome: Progressing     Problem: Chronic Conditions and Co-morbidities  Goal: Patient's chronic conditions and co-morbidity symptoms are monitored and maintained or improved  Outcome: Progressing     Problem: Nutrition  Goal: Nutrient intake appropriate for maintaining nutritional needs  Outcome: Progressing     Problem: Skin  Goal: Decreased wound size/increased tissue granulation at next dressing change  Outcome: Progressing  Goal: Participates in plan/prevention/treatment measures  Outcome: Progressing  Goal: Prevent/manage excess moisture  Outcome: Progressing  Goal: Prevent/minimize sheer/friction injuries  Outcome: Progressing  Goal: Promote/optimize nutrition  Outcome: Progressing  Goal: Promote skin healing  Outcome: Progressing     Problem: Fall/Injury  Goal: Not fall by end of shift  Outcome: Progressing  Goal: Be free from injury by end of the shift  Outcome: Progressing  Goal: Verbalize understanding of personal risk factors for fall in the hospital  Outcome: Progressing  Goal: Verbalize understanding of risk factor reduction measures to prevent injury from fall in the home  Outcome: Progressing  Goal: Use assistive devices by end of the shift  Outcome: Progressing  Goal: Pace activities to prevent fatigue by end of the shift  Outcome: Progressing

## 2025-08-06 NOTE — PROGRESS NOTES
Occupational Therapy    Evaluation and Treatment    Patient Name: Ann Marie Murphy  MRN: [id number]  Today's Date: 8/6/2025  Room: [facility]  Time Calculation  Start Time: 1151  Stop Time: 1215  Time Calculation (min): 24 min    Assessment  IP OT Assessment  OT Assessment: Pt demos deficits in activity tolerance, strength, ROM, balance, coordination, and cogntion resulting in increased need for assist w/ I/ADLs and the continued need for skilled OT services at MOD intensity to allow for a safe and functional d/c.  Prognosis: Good  Barriers to Discharge Home: Caregiver assistance, Cognition needs, Physical needs  Caregiver Assistance: Patient lives alone and/or does not have reliable caregiver assistance  Cognition Needs: 24hr supervision for safety awareness needed, Cognition-related high falls risk  Physical Needs: Stair navigation into home limited by function/safety, 24hr mobility assistance needed, 24hr ADL assistance needed, High falls risk due to function or environment  Evaluation/Treatment Tolerance: Patient tolerated treatment well  Medical Staff Made Aware: Yes  End of Session Communication: Bedside nurse  End of Session Patient Position: Bed, 3 rail up, Alarm on  Plan:  Inpatient Plan  Treatment Interventions: ADL retraining, Functional transfer training, UE strengthening/ROM, Endurance training, Cognitive reorientation, Patient/family training, Equipment evaluation/education, Compensatory technique education  OT Frequency: 3 times per week  OT Discharge Recommendations: Moderate intensity level of continued care (Based on current functional status and rehab potential, patient is anticipated to tolerate and benefit from 5 or more days per week of skilled rehabilitative therapy after discharge from this acute inpatient hospitalization.)  Equipment Recommended upon Discharge:  (TBD)  OT Recommended Transfer Status: Assist of 1  OT - OK to Discharge: Yes  OT Assessment  OT Assessment Results: Decreased ADL  status, Decreased cognition, Decreased endurance, Decreased functional mobility, Decreased IADLs  Prognosis: Good  Evaluation/Treatment Tolerance: Patient tolerated treatment well  Medical Staff Made Aware: Yes  Strengths: Attitude of self, Premorbid level of function, Support of Caregivers  Barriers to Participation: Comorbidities, Ability to acquire knowledge    Subjective   Current Problem:  1. NSTEMI (non-ST elevated myocardial infarction) (Multi)  CANCELED: Transthoracic Echo (TTE) Limited    CANCELED: Transthoracic Echo (TTE) Limited      2. Fall, initial encounter        3. Vasovagal syncope  Transthoracic Echo Complete    Transthoracic Echo Complete      4. Seborrheic keratosis  General    General        General:  Reason for Referral: This 89 y/o F presented ot ED 8/2 s/p syncopal fall the night prior (found down by dtr in am) and tropenmia. Found to have NSTEMI and UTI.  Past Medical History Relevant to Rehab: Dementia, HTN, HLD, seizure, breast CA in remission  Prior to Session Communication: Bedside nurse  Patient Position Received: Bed, 3 rail up, Alarm on  Family/Caregiver Present: Yes  Caregiver Feedback: Dtr present and supportive  General Comment: Pt sup in bed on approach. Pleasantly confused, however agreeable to OT assessment.   Precautions:  STEADI Fall Risk Score (The score of 4 or more indicates an increased risk of falling): '  Medical Precautions: Fall precautions  Pain:  Pain Assessment  Pain Assessment:  (Pt unable to rate pain, however reports back pain)  Lines/Tubes/Drains:  External Urinary Catheter (Active)   Number of days: 1         Objective   Cognition:  Overall Cognitive Status: Impaired  Orientation Level: Disoriented to place, Disoriented to time, Disoriented to situation  Cognition Comments: Pt demos visual hallucination during session w/ report of seeing bees in the room and requires reassurance from this OTR and from sitter on pt's safety  Insight: Moderate  Impulsive:  Mildly  Home Living:  Type of Home: House (4 family home, relatives live in the same building and able to check in)  Lives With: Alone  Home Adaptive Equipment: Cane (Rollator and FWW)  Home Layout: One level  Home Access: Stairs to enter with rails  Entrance Stairs-Number of Steps: 14  Bathroom Shower/Tub: Tub/shower unit  Bathroom Equipment: Shower chair without back   Prior Function:  Level of [place]: Independent with ADLs and functional transfers, Independent with homemaking with ambulation  Receives Help From: Family  ADL Assistance: Independent (Dtr report pt may be in need for increased assist w/ ADLs but pt refuses offered assistance)  Homemaking Assistance: Needs assistance (Family assists; per dtr pt able to make basic meals w/o assist)  Ambulatory Assistance: Independent (w/ rollator however does not always use in home)  Leisure: mainly homebound. Watches TV, reads, word searches  Prior Function Comments: (-) drives; 2 falls  ADL:  Eating Assistance: Stand by  Grooming Assistance: Stand by  Bathing Assistance: Minimal  UE Dressing Assistance: Minimal  LE Dressing Assistance: Moderate  Toileting Assistance with Device: Moderate  Activity Tolerance:  Endurance: Tolerates 10 - 20 min exercise with multiple rests  Balance:  Dynamic Sitting Balance  Dynamic Sitting-Level of Assistance: Close supervision  Dynamic Standing Balance  Dynamic Standing-Level of Assistance: Moderate assistance  Dynamic Standing-Comments: Posterior lean  Bed Mobility/Transfers: Bed Mobility/Transfers: Bed Mobility 1  Bed Mobility 1: Supine to sitting  Level of Assistance 1: Close supervision  Bed Mobility 2  Bed Mobility  2: Sitting to supine  Level of Assistance 2: Minimum assistance  Bed Mobility Comments 2: Assist w/ BLE  Functional Mobility  Functional Mobility Performed: Yes  Functional Mobility 1  Surface 1: Level tile  Assistance 1: Moderate assistance, Hand held assistance  Comments 1: Side steps at EOB w/ retro lean  noted   and Transfer 1  Technique 1: Sit to stand  Transfer Device 1:  (HHA)  Transfer Level of Assistance 1: Moderate assistance  Trials/Comments 1: 2x throughout session  Sensation:  Light Touch: No apparent deficits  Coordination:  Movements are Fluid and Coordinated: Yes   Hand Function:  Hand Function  Gross Grasp: Functional  Coordination: Functional  Extremities:   RUE   RUE : Within Functional Limits, LUE   LUE: Within Functional Limits    Outcome Measures: [facility] Daily Activity  Putting on and taking off regular lower body clothing: A lot  Bathing (including washing, rinsing, drying): A lot  Putting on and taking off regular upper body clothing: A little  Toileting, which includes using toilet, bedpan or urinal: A lot  Taking care of personal grooming such as brushing teeth: A little  Eating Meals: A little  Daily Activity - Total Score: 15         ,     OT Adult Other Outcome Measures  4AT: 12 - possible delirium and/or cog impairment    Education Documentation  Body Mechanics, taught by Solange Noonan OT at 8/6/2025  3:39 PM.  Learner: Patient  Readiness: Acceptance  Method: Explanation  Response: Verbalizes Understanding    Precautions, taught by Solange Noonan OT at 8/6/2025  3:39 PM.  Learner: Patient  Readiness: Acceptance  Method: Explanation  Response: Verbalizes Understanding    ADL Training, taught by Solange Noonan OT at 8/6/2025  3:39 PM.  Learner: Patient  Readiness: Acceptance  Method: Explanation  Response: Verbalizes Understanding    Education Comments  No comments found.        Goals:   Encounter Problems       Encounter Problems (Active)       ADLs       Patient will perform UB and LB bathing with stand by assist level of assistance and shower chair.       Start:  08/06/25    Expected End:  08/20/25            Patient with complete upper body dressing with set-up level of assistance donning and doffing all UE clothes with PRN adaptive equipment while edge of bed        Start:  08/06/25     Expected End:  08/20/25            Patient with complete lower body dressing with contact guard assist level of assistance donning and doffing all LE clothes  with PRN adaptive equipment while edge of bed        Start:  08/06/25    Expected End:  08/20/25            Patient will complete daily grooming tasks brushing teeth and washing face/hair with set-up level of assistance and PRN adaptive equipment while supported sitting.       Start:  08/06/25    Expected End:  08/20/25            Patient will complete toileting including hygiene clothing management/hygiene with contact guard assist level of assistance and raised toilet seat.       Start:  08/06/25    Expected End:  08/20/25               COGNITION/SAFETY       Patient will demonstrated orientation x 4.       Start:  08/06/25    Expected End:  08/20/25       ORIENTATION            MOBILITY       Patient will perform Functional mobility mod  Household distances/Community Distances with contact guard assist level of assistance and least restrictive device in order to improve safety and functional mobility.       Start:  08/06/25    Expected End:  08/20/25               TRANSFERS       Patient will perform bed mobility supervision level of assistance in order to improve safety and independence with mobility       Start:  08/06/25    Expected End:  08/20/25            Patient will complete functional transfer to chair/toilet with least restrictive device with CGA level of assistance.       Start:  08/06/25    Expected End:  08/20/25                   Treatment Completed on Evaluation    Activities of Daily Living:      LE Dressing  LE Dressing: Yes  Sock Level of Assistance: Contact guard  LE Dressing Where Assessed: Edge of bed  LE Dressing Comments: Doff/donning    Therapy/Activity:     Therapeutic Activity  Therapeutic Activity 1: Pt very distractible and tangential thoughts throughout session requiring frequent redirection. Pt completes Sup>Sit. Following STS  pt throws herself back into bed in fear reporting bees on the wall. OTR able to reassure pt of her safety and pt able to accept that bees are not in fact in her room. Second STS completed w/ side steps to HOB w/ pt demo'ing poor sequencing and retropulsion.      08/06/25 at 3:44 PM   SHIVA NOVAK, OT   Rehab Office: [phone]

## 2025-08-06 NOTE — CARE PLAN
The patient's goals for the shift include patient sleeps at least 6 hours throughout shift     The clinical goals for the shift include no fall or injury by the end of shift    Problem: Nutrition  Goal: Nutrient intake appropriate for maintaining nutritional needs  Outcome: Progressing     Problem: Skin  Goal: Participates in plan/prevention/treatment measures  Outcome: Progressing     Problem: Fall/Injury  Goal: Not fall by end of shift  Outcome: Progressing  Goal: Be free from injury by end of the shift  Outcome: Progressing  Goal: Verbalize understanding of personal risk factors for fall in the hospital  Outcome: Progressing  Goal: Verbalize understanding of risk factor reduction measures to prevent injury from fall in the home  Outcome: Progressing  Goal: Use assistive devices by end of the shift  Outcome: Progressing  Goal: Pace activities to prevent fatigue by end of the shift  Outcome: Progressing

## 2025-08-06 NOTE — PROGRESS NOTES
Physical Therapy    Physical Therapy Treatment    Patient Name: Ann Marie Murphy  MRN: [id number]  Department: [place] [id number]  Room: 6071/6071-B  Today's Date: 8/6/2025  Time Calculation  Start Time: 1056  Stop Time: 1134  Time Calculation (min): 38 min    Assessment/Plan   PT Assessment  Barriers to Discharge Home: Caregiver assistance, Physical needs, Cognition needs  Caregiver Assistance: Patient lives alone and/or does not have reliable caregiver assistance  Cognition Needs: Insight of patient limited regarding functional ability/needs  Physical Needs: Stair navigation into home limited by function/safety, Ambulating household distances limited by function/safety, 24hr ADL assistance needed, High falls risk due to function or environment  End of Session Communication: Bedside nurse  Assessment Comment: Pt tolerated PT session well, able to complete bed mobility, transfers and short bout of ambulation however limited by endurance tolerance and stability in sitting and standing positions. Pt continues to remain appropriate for Moderate intensity PT upon D/C from hospital.  End of Session Patient Position: Bed, 3 rail up, Alarm on  PT Plan  Inpatient/Swing Bed or Outpatient: Inpatient  PT Plan  Treatment/Interventions: Bed mobility, Transfer training, Gait training, Stair training, Balance training, Strengthening, Endurance training, Therapeutic exercise, Therapeutic activity, Home exercise program  PT Plan: Ongoing PT  PT Frequency: 3 times per week (during this IP admission)  PT Discharge Recommendations: Moderate intensity level of continued care (Based on current functional status and rehab potential, patient is anticipated to tolerate and benefit from 5 or more days per week of skilled rehabilitative therapy after discharge from this acute inpatient hospitalization.)  PT Recommended Transfer Status: Assist x1, Assistive device  PT - OK to Discharge: Yes (PT evaluation complete and rehab recommendations made.)    PT  "Visit Info:  PT Received On: 08/06/25     General Visit Information:   General  Family/Caregiver Present: Yes  Caregiver Feedback: Daughter present  Prior to Session Communication: Bedside nurse  Patient Position Received: Bed, 3 rail up, Alarm on  General Comment: Pt supine in bed, agreeable to work with PT. Confused with tangetible speech.    Subjective   Precautions:  Precautions  Medical Precautions: Fall precautions    Objective   Pain:  Pain Assessment  Pain Assessment: 0-10  0-10 (Numeric) Pain Score:  (Unrated, states \"always pain\")  Pain Type: Chronic pain  Pain Location: Back  Pain Orientation: Lower  Cognition:  Cognition  Overall Cognitive Status: Impaired  Orientation Level: Disoriented to time, Disoriented to situation (Pt able to recall [facility] however states \"July and 20..\" when asked month and year.)    Activity Tolerance:  Activity Tolerance  Endurance: Tolerates 10 - 20 min exercise with multiple rests  Treatments:  Therapeutic Activity  Therapeutic Activity Performed: Yes  Therapeutic Activity 1: Pt tolerated static sitting EOB, required CGA with occassional Min-ModA d/t posterior lean. Pt at times with verbal cues able to adjust posture however unable to maintain independently.  Therapeutic Activity 2: Pt performed x2 STS transfers from EOB with FWW and ModA, heavy posterior lean. Pt able to progress to x2 lateral steps, returned to seated position between trials.  Therapeutic Activity 3: Pt performed transfer from bed <>chair, cues for sequencing, face to face, arm in arm sequenicng. ModAx1    Bed Mobility  Bed Mobility: Yes  Bed Mobility 1  Bed Mobility 1: Supine to sitting  Level of Assistance 1: Moderate assistance, Minimal verbal cues  Bed Mobility Comments 1: x2 trials, Assist at trunk  Bed Mobility 2  Bed Mobility  2: Sitting to supine  Level of Assistance 2: Minimum assistance, Minimal verbal cues  Bed Mobility Comments 2: Assist at trunk, pt able to advance LEs in on 1st trial however " required assist 2nd trial likely d/t fatigue.    Ambulation/Gait Training  Ambulation/Gait Training Performed: Yes  Ambulation/Gait Training 1  Surface 1: Level tile  Device 1: Rolling walker  Assistance 1: Moderate assistance, Moderate verbal cues  Quality of Gait 1: Narrow base of support, Diminished heel strike, Decreased step length, Shuffling gait (posterior lean,)  Comments/Distance (ft) 1: 2 trials x2 lateral steps towards HOB, cues for sequencing and assist for walker management.    Transfers  Transfer: Yes  Transfer 1  Transfer From 1: Bed to  Transfer to 1: Stand  Technique 1: Sit to stand  Transfer Device 1: Walker  Transfer Level of Assistance 1: Moderate assistance, Minimal verbal cues  Trials/Comments 1: x2 trials, cues for safe sequencing and safe hand placement.  Transfers 2  Transfer From 2: Bed to  Transfer to 2: Chair with arms  Technique 2: Stand pivot  Transfer Level of Assistance 2: Arm in arm assistance, Moderate assistance, Minimal verbal cues  Trials/Comments 2: Cues for sequencing, face to face, cues for arm in arm, assist to and from bed<>chair.    Stairs  Stairs: No    Outcome Measures:  [facility] Basic Mobility  Turning from your back to your side while in a flat bed without using bedrails: A little  Moving from lying on your back to sitting on the side of a flat bed without using bedrails: A lot  Moving to and from bed to chair (including a wheelchair): A lot  Standing up from a chair using your arms (e.g. wheelchair or bedside chair): A lot  To walk in hospital room: A lot  Climbing 3-5 steps with railing: Total  Basic Mobility - Total Score: 12    Education Documentation  Precautions, taught by Maggi Klein PTA at 8/6/2025 12:47 PM.  Learner: Patient  Readiness: Acceptance  Method: Explanation  Response: Verbalizes Understanding, Needs Reinforcement  Comment: Safe mobility with bed mobility, transfers and ambulation,    Body Mechanics, taught by Maggi Klein PTA at 8/6/2025  12:47 PM.  Learner: Patient  Readiness: Acceptance  Method: Explanation  Response: Verbalizes Understanding, Needs Reinforcement  Comment: Safe mobility with bed mobility, transfers and ambulation,    Mobility Training, taught by aMggi Klein PTA at 8/6/2025 12:47 PM.  Learner: Patient  Readiness: Acceptance  Method: Explanation  Response: Verbalizes Understanding, Needs Reinforcement  Comment: Safe mobility with bed mobility, transfers and ambulation,    Education Comments  No comments found.      Encounter Problems       Encounter Problems (Active)       PT Problem       Pt. will perform bed mobility with CGA  (Progressing)       Start:  08/18/25    Expected End:  08/20/25            Pt. will perform transfers with RW with CGA  (Progressing)       Start:  08/18/25    Expected End:  08/20/25            Pt. will ambulate > 50 ft. with RW with CGA  (Progressing)       Start:  08/18/25    Expected End:  08/20/25            Pt. will ascend/descend 1 flight of stairs with min A to safely enter/exit the home set up  (Progressing)       Start:  08/18/25    Expected End:  08/20/25 08/06/25 at 3:23 PM   Maggi Klein PTA   Rehab Office: [phone]

## 2025-08-06 NOTE — PROGRESS NOTES
Ann Marie Murphy is a 89 y.o. female on day 4 of admission presenting with NSTEMI (non-ST elevated myocardial infarction) (Multi).      Subjective     Required zyprexa overnight for agitation. This morning she is confused and not oriented to place or time. Labs this morning were ok, and no fevers reports overnight. Daughter at bedside and says mental status is new.     Objective     Last Recorded Vitals  /69   Pulse 78   Temp 36.6 °C (97.9 °F)   Resp 14   Wt 54.4 kg (120 lb)   SpO2 94%   Intake/Output last 3 Shifts:    Intake/Output Summary (Last 24 hours) at 8/6/2025 1702  Last data filed at 8/6/2025 1545  Gross per 24 hour   Intake 687 ml   Output 1950 ml   Net -1263 ml       Admission Weight  Weight: 54.4 kg (120 lb) (08/02/25 1126)    Daily Weight  08/02/25 : 54.4 kg (120 lb)    Image Results  Transthoracic Echo Complete     [facility], [address] [place] [address]                 Tel [phone] and Fax [phone]    TRANSTHORACIC ECHOCARDIOGRAM REPORT       Patient Name:       ANN MARIE MURPHY  Reading Physician:    [id number] Jacquie Hdz MD  Study Date:         8/4/2025            Ordering Provider:    [id number] ZOHREH RODRIGUEZ  MRN/PID:            [id number]            Fellow:  Accession#:         [id number]        Nurse:                Nayely Cooley RN  Date of Birth/Age:  1935 / 89      Sonographer:          Nathaly duron                                     DILLON  Gender assigned at  F                   Additional Staff:  Birth:  Height:             152.40 cm           Admit Date:  Weight:             54.43 kg            Admission Status:     Inpatient -                                                                Routine  BSA / BMI:          1.50 m2 / 23.44     Encounter#:           [id number]                       kg/m2  Blood Pressure:     134/69 mmHg         Department Location:  [facility]                                                                Non Invasive    Study Type:    TRANSTHORACIC ECHO (TTE) COMPLETE  Diagnosis/ICD: Syncope-R55  Indication:    Syncope  CPT Code:      Echo Complete w Full Doppler-[id number]    Patient History:  MI Location/Type:  Non-ST Elevation MI  Pertinent History: HTN, Hyperlipidemia and CVA. HOCM< LVH, Anemia, GERD.    Study Detail: The following Echo studies were performed: color flow, Doppler,                M-Mode and 2D. Technically challenging study due to body habitus                and Dementia. Optison used as a contrast agent for endocardial                border definition. Total contrast used for this procedure was 3.5                mL via IV push.       PHYSICIAN INTERPRETATION:  Left Ventricle: Left ventricular ejection fraction is normal by visual estimate at 70-75%. There is severe concentric left ventricular hypertrophy. There are no regional left ventricular wall motion abnormalities. The left ventricular cavity size is decreased. There is severely increased septal and moderately increased posterior left ventricular wall thickness. Spectral Doppler shows a Grade I (impaired relaxation pattern) of left ventricular diastolic filling with normal left atrial filling pressure.  Left Atrium: The left atrium is mildly dilated.  Right Ventricle: The right ventricle is normal in size. There is normal right ventricular global systolic function.  Right Atrium: The right atrium is normal in size.  Aortic Valve: The aortic valve is trileaflet. There is mild aortic valve thickening. There is no evidence of aortic valve regurgitation.  Mitral Valve: The mitral valve is mildly thickened. There is moderate mitral annular calcification. There is trace mitral valve regurgitation. The E Vmax is 0.73 m/s.  Tricuspid Valve: The tricuspid valve is structurally normal. There is trace  tricuspid regurgitation.  Pulmonic Valve: The pulmonic valve is structurally normal. There is no indication of pulmonic valve regurgitation.  Pericardium: Small pericardial effusion.  Aorta: The aortic root is normal. The Asc Ao is 3.20 cm. There is no dilatation of the ascending aorta.  Systemic Veins: The inferior vena cava appears small in size, with IVC inspiratory collapse greater than 50%.  In comparison to the previous echocardiogram(s): Compared with study dated 2/12/2020, no significant change.       CONCLUSIONS:   1. Left ventricular ejection fraction is normal by visual estimate at 70-75%.   2. Spectral Doppler shows a Grade I (impaired relaxation pattern) of left ventricular diastolic filling with normal left atrial filling pressure.   3. Left ventricular cavity size is decreased.   4. There is severely increased septal and moderately increased posterior left ventricular wall thickness.   5. There is severe concentric left ventricular hypertrophy.   6. There is normal right ventricular global systolic function.   7. The left atrium is mildly dilated.   8. Small pericardial effusion.   9. There is moderate mitral annular calcification.    RECOMMENDATIONS:  Utilizing an FDA cleared automated machine learning algorithm (EchoGo Heart Failure by [employer]), the analysis of the apical 4-chamber echocardiogram suggests the presence of heart failure with preserved ejection fraction (HFpEF)*. Clinical correlation looking for additional heart failure signs and symptoms is recommended, as a definite diagnosis of heart failure cannot be made by imaging alone.  *Per ACC/AHA/HFSA universal diagnosis of heart failure, HFpEF is defined as 1) signs and symptoms leading to clinical diagnosis of heart failure, 2) an ejection fraction of at least 50%, and 3) evidence of elevated intra-cardiac filling pressures by echocardiography, BNP elevation, or catheterization.     QUANTITATIVE DATA SUMMARY:     2D MEASUREMENTS:           Normal Ranges:  Ao Root d:       3.40 cm  (2.0-3.7cm)  LAs:             3.45 cm  (2.7-4.0cm)  IVSd:            1.60 cm  (0.6-1.1cm)  LVPWd:           1.50 cm  (0.6-1.1cm)  LVIDd:           3.40 cm  (3.9-5.9cm)  LVIDs:           1.70 cm  LV Mass Index:   131 g/m2  LV % FS          50.0 %       LEFT ATRIUM:                  Normal Ranges:  LA Vol A4C:        35.0 ml    (22+/-6mL/m2)  LA Vol A2C:        53.8 ml  LA Vol BP:         45.9 ml  LA Vol Index A4C:  23.3ml/m2  LA Vol Index A2C:  35.8 ml/m2  LA Vol Index BP:   30.6 ml/m2  LA Area A4C:       14.5 cm2  LA Area A2C:       19.0 cm2  LA Major Axis A4C: 5.1 cm  LA Major Axis A2C: 5.7 cm  LA Volume Index:   35.8 ml/m2       AORTA MEASUREMENTS:         Normal Ranges:  Asc Ao, d:          3.20 cm (2.1-3.4cm)       LV SYSTOLIC FUNCTION:                       Normal Ranges:  EF-Visual:      73 %  LV EF Reported: 73 %       LV DIASTOLIC FUNCTION:             Normal Ranges:  MV Peak E:             0.73 m/s    (0.7-1.2 m/s)  MV Peak A:             1.11 m/s    (0.42-0.7 m/s)  E/A Ratio:             0.66        (1.0-2.2)  MV e'                  0.082 m/s   (>8.0)  MV lateral e'          0.12 m/s  MV medial e'           0.05 m/s  MV A Dur:              144.00 msec  E/e' Ratio:            8.82        (<8.0)  MV DT:                 250 msec    (150-240 msec)       MITRAL VALVE:          Normal Ranges:  MV DT:        250 msec (150-240msec)       AORTIC VALVE:           Normal Ranges:  AoV Vmax:      1.45 m/s (<=1.7m/s)  AoV Peak P.4 mmHg (<20mmHg)  LVOT Max Eugene:  1.33 m/s (<=1.1m/s)  LVOT VTI:      29.30 cm  LVOT Diameter: 2.00 cm  (1.8-2.4cm)  AoV Area,Vmax: 2.88 cm2 (2.5-4.5cm2)       RIGHT VENTRICLE:  RV Basal 3.10 cm  RV Mid   1.70 cm  RV Major 8.3 cm  RV s'    0.11 m/s       [id number] Jacquie Hdz MD  Electronically signed on 2025 at 7:52:05 AM       ** Final **      Physical Exam  Constitutional:       General: She is not in acute distress.     Appearance: She is  not toxic-appearing.   HENT:      Head: Normocephalic and atraumatic.      Mouth/Throat:      Mouth: Mucous membranes are moist.      Pharynx: No oropharyngeal exudate.     Eyes:      General: No scleral icterus.     Pupils: Pupils are equal, round, and reactive to light.       Cardiovascular:      Rate and Rhythm: Normal rate and regular rhythm.      Pulses: Normal pulses.      Heart sounds: No murmur heard.  Pulmonary:      Effort: Pulmonary effort is normal. No respiratory distress.      Breath sounds: No wheezing.   Abdominal:      General: Abdomen is flat. There is no distension.      Tenderness: There is no abdominal tenderness.     Musculoskeletal:      Right lower leg: No edema.      Left lower leg: No edema.     Skin:     General: Skin is warm.      Coloration: Skin is not jaundiced.     Neurological:      General: No focal deficit present.      Mental Status: She is oriented to person, place, and time. Mental status is at baseline.     Psychiatric:         Mood and Affect: Mood normal.         Behavior: Behavior normal.         Thought Content: Thought content normal.         Relevant Results    Scheduled medications  Scheduled Medications[1]  Continuous medications  Continuous Medications[2]  PRN medications  PRN Medications[3]     Results for orders placed or performed during the hospital encounter of 08/02/25 (from the past 24 hours)   CBC and Auto Differential   Result Value Ref Range    WBC 7.7 4.4 - 11.3 x10*3/uL    nRBC 0.0 0.0 - 0.0 /100 WBCs    RBC 4.00 4.00 - 5.20 x10*6/uL    Hemoglobin 12.9 12.0 - 16.0 g/dL    Hematocrit 39.4 36.0 - 46.0 %    MCV 99 80 - 100 fL    MCH 32.3 26.0 - 34.0 pg    MCHC 32.7 32.0 - 36.0 g/dL    RDW 13.3 11.5 - 14.5 %    Platelets 198 150 - 450 x10*3/uL    Neutrophils % 61.2 40.0 - 80.0 %    Immature Granulocytes %, Automated 0.3 0.0 - 0.9 %    Lymphocytes % 28.6 13.0 - 44.0 %    Monocytes % 8.2 2.0 - 10.0 %    Eosinophils % 1.3 0.0 - 6.0 %    Basophils % 0.4 0.0 - 2.0 %     Neutrophils Absolute 4.70 1.60 - 5.50 x10*3/uL    Immature Granulocytes Absolute, Automated 0.02 0.00 - 0.50 x10*3/uL    Lymphocytes Absolute 2.19 0.80 - 3.00 x10*3/uL    Monocytes Absolute 0.63 0.05 - 0.80 x10*3/uL    Eosinophils Absolute 0.10 0.00 - 0.40 x10*3/uL    Basophils Absolute 0.03 0.00 - 0.10 x10*3/uL   Renal Function Panel   Result Value Ref Range    Glucose 98 74 - 99 mg/dL    Sodium 143 136 - 145 mmol/L    Potassium 3.6 3.5 - 5.3 mmol/L    Chloride 109 (H) 98 - 107 mmol/L    Bicarbonate 27 21 - 32 mmol/L    Anion Gap 11 10 - 20 mmol/L    Urea Nitrogen 14 6 - 23 mg/dL    Creatinine 0.54 0.50 - 1.05 mg/dL    eGFR 88 >60 mL/min/1.73m*2    Calcium 10.3 8.6 - 10.6 mg/dL    Phosphorus 2.8 2.5 - 4.9 mg/dL    Albumin 3.7 3.4 - 5.0 g/dL   Magnesium   Result Value Ref Range    Magnesium 1.96 1.60 - 2.40 mg/dL                    Assessment & Plan  NSTEMI (non-ST elevated myocardial infarction) (Multi)    Pt is a 89 y.o. female with hx of right-sided invasive ductal carcinoma, clinical stage IB (T2 N0 M0),  dementia,  hypertension, HLD, seizure disorder who presents after an unwitnessed fall last night. Patient was admitted for further workup after syncope, fall and tropenmia.     #AMS likely 2/2 delirium  - Unclear etiology   - Will order infectious workup to rule out metabolic cause     #Syncope  #Fall  - Ddx: Orthostasis 2/2 hypovolemia in setting of UTI vs Vasovagal vs Cardiac/Structural - imaging unremarkable including CTH, cervical spine, maxillofacial bones w/o con, and XR pelvis   - ECHO concerning severely increased septal and moderately increased posterior left ventricular wall thickness.   Plan  - PT-Rec SNF  - Tylenol prn for pain   - Orthostats in the AM      #Uncomplicated UTI  -Ucx-Clinically insignificant growth based on current clinical standards.   -CTX x 5 days - tomorrow is last day       #NSTEMI likely Type 2   -EKG NSR w/o ST elevation   - A1C 5.6%,   - trops 150 -> 159 -> 154, will stop  trending  PLAN:  -Stat EKG and ACS workup/management if endorsing chest pain  - C/h lipitor 10 mg and ASA 81 mg daily   - C/h metop succinate 100 mg daily      #Chronic conditions  - HTN: c/h valsartan 320 mg daily   - Dementia: c/h sertraline 25 mg and seroquel 12.5 mg daily   - Seizure disorder: c/h keppra 500 mg BID   - H/o breast carcinoma in remission: c/h anastrozole      F : PRN  E: PRN  N: Adult diet Regular  A:  PIV  DVT prophylaxis: Lovenox subq     Code Status: Full Code (confirmed on admission)   NOK: Extended Emergency Contact Information  Primary Emergency Contact: Mary Sebastian  [place] Phone: [phone]  Mobile Phone: [phone]  Relation: Daughter  Secondary Emergency Contact: Micah Reddy   [place]  Mobile Phone: [phone]  Relation: Daughter     DISPO: [facility] medical optimization   FOLLOW UPS: PCP    Christine Byrd MD             [1] anastrozole, 1 mg, oral, Daily  aspirin, 81 mg, oral, Daily  atorvastatin, 10 mg, oral, Nightly  cefTRIAXone, 1 g, intravenous, q24h  enoxaparin, 40 mg, subcutaneous, q24h  levETIRAcetam, 500 mg, oral, BID  metoprolol succinate XL, 100 mg, oral, Daily  QUEtiapine, 12.5 mg, oral, BID  sennosides-docusate sodium, 2 tablet, oral, BID  sertraline, 25 mg, oral, Daily  valsartan, 320 mg, oral, Daily     [2]    [3] PRN medications: acetaminophen

## 2025-08-06 NOTE — PROGRESS NOTES
08/06/25 1035   Rapid Rounds   Attendance Provider;Care Transitions   Expected Discharge Disposition SNF   Today we still await: Clinical stability;Consultant recommendations (Comment);Placement process  (MD to possibly consult Cardiology)   Review at Escalation Rounds No escalation needed     [facility] not [facility] d/t no beds available. List of facilities emailed to daughter at [email or web address].      Malu Washington RN, BSN  Transitional Care Coordinator

## 2025-08-07 LAB
ALBUMIN SERPL BCP-MCNC: 3.6 G/DL (ref 3.4–5)
ANION GAP SERPL CALC-SCNC: 11 MMOL/L (ref 10–20)
BASOPHILS # BLD AUTO: 0.04 X10*3/UL (ref 0–0.1)
BASOPHILS NFR BLD AUTO: 0.5 %
BUN SERPL-MCNC: 17 MG/DL (ref 6–23)
CALCIUM SERPL-MCNC: 9.9 MG/DL (ref 8.6–10.6)
CHLORIDE SERPL-SCNC: 111 MMOL/L (ref 98–107)
CO2 SERPL-SCNC: 25 MMOL/L (ref 21–32)
CREAT SERPL-MCNC: 0.52 MG/DL (ref 0.5–1.05)
EGFRCR SERPLBLD CKD-EPI 2021: 89 ML/MIN/1.73M*2
EOSINOPHIL # BLD AUTO: 0.13 X10*3/UL (ref 0–0.4)
EOSINOPHIL NFR BLD AUTO: 1.6 %
ERYTHROCYTE [DISTWIDTH] IN BLOOD BY AUTOMATED COUNT: 13.6 % (ref 11.5–14.5)
GLUCOSE SERPL-MCNC: 94 MG/DL (ref 74–99)
HCT VFR BLD AUTO: 40.4 % (ref 36–46)
HGB BLD-MCNC: 13 G/DL (ref 12–16)
IMM GRANULOCYTES # BLD AUTO: 0.04 X10*3/UL (ref 0–0.5)
IMM GRANULOCYTES NFR BLD AUTO: 0.5 % (ref 0–0.9)
LYMPHOCYTES # BLD AUTO: 2.18 X10*3/UL (ref 0.8–3)
LYMPHOCYTES NFR BLD AUTO: 26 %
MAGNESIUM SERPL-MCNC: 2.02 MG/DL (ref 1.6–2.4)
MCH RBC QN AUTO: 32.6 PG (ref 26–34)
MCHC RBC AUTO-ENTMCNC: 32.2 G/DL (ref 32–36)
MCV RBC AUTO: 101 FL (ref 80–100)
MONOCYTES # BLD AUTO: 0.94 X10*3/UL (ref 0.05–0.8)
MONOCYTES NFR BLD AUTO: 11.2 %
NEUTROPHILS # BLD AUTO: 5.04 X10*3/UL (ref 1.6–5.5)
NEUTROPHILS NFR BLD AUTO: 60.2 %
NRBC BLD-RTO: 0 /100 WBCS (ref 0–0)
PHOSPHATE SERPL-MCNC: 2.8 MG/DL (ref 2.5–4.9)
PLATELET # BLD AUTO: 195 X10*3/UL (ref 150–450)
POTASSIUM SERPL-SCNC: 3.6 MMOL/L (ref 3.5–5.3)
RBC # BLD AUTO: 3.99 X10*6/UL (ref 4–5.2)
SODIUM SERPL-SCNC: 143 MMOL/L (ref 136–145)
WBC # BLD AUTO: 8.4 X10*3/UL (ref 4.4–11.3)

## 2025-08-07 PROCEDURE — 2500000004 HC RX 250 GENERAL PHARMACY W/ HCPCS (ALT 636 FOR OP/ED): Performed by: STUDENT IN AN ORGANIZED HEALTH CARE EDUCATION/TRAINING PROGRAM

## 2025-08-07 PROCEDURE — 1210000001 HC SEMI-PRIVATE ROOM DAILY

## 2025-08-07 PROCEDURE — 2500000004 HC RX 250 GENERAL PHARMACY W/ HCPCS (ALT 636 FOR OP/ED)

## 2025-08-07 PROCEDURE — 36415 COLL VENOUS BLD VENIPUNCTURE: CPT

## 2025-08-07 PROCEDURE — 2500000001 HC RX 250 WO HCPCS SELF ADMINISTERED DRUGS (ALT 637 FOR MEDICARE OP)

## 2025-08-07 PROCEDURE — 85025 COMPLETE CBC W/AUTO DIFF WBC: CPT

## 2025-08-07 PROCEDURE — 83735 ASSAY OF MAGNESIUM: CPT

## 2025-08-07 PROCEDURE — 84100 ASSAY OF PHOSPHORUS: CPT

## 2025-08-07 PROCEDURE — 2500000002 HC RX 250 W HCPCS SELF ADMINISTERED DRUGS (ALT 637 FOR MEDICARE OP, ALT 636 FOR OP/ED)

## 2025-08-07 PROCEDURE — 99232 SBSQ HOSP IP/OBS MODERATE 35: CPT | Performed by: STUDENT IN AN ORGANIZED HEALTH CARE EDUCATION/TRAINING PROGRAM

## 2025-08-07 RX ADMIN — LEVETIRACETAM 500 MG: 500 TABLET, FILM COATED ORAL at 10:11

## 2025-08-07 RX ADMIN — LEVETIRACETAM 500 MG: 500 TABLET, FILM COATED ORAL at 20:09

## 2025-08-07 RX ADMIN — SENNOSIDES AND DOCUSATE SODIUM 2 TABLET: 50; 8.6 TABLET ORAL at 10:11

## 2025-08-07 RX ADMIN — SENNOSIDES AND DOCUSATE SODIUM 2 TABLET: 50; 8.6 TABLET ORAL at 20:09

## 2025-08-07 RX ADMIN — QUETIAPINE FUMARATE 12.5 MG: 25 TABLET ORAL at 20:09

## 2025-08-07 RX ADMIN — ENOXAPARIN SODIUM 40 MG: 100 INJECTION SUBCUTANEOUS at 20:09

## 2025-08-07 RX ADMIN — SERTRALINE HYDROCHLORIDE 25 MG: 25 TABLET ORAL at 10:13

## 2025-08-07 RX ADMIN — ASPIRIN 81 MG: 81 TABLET, COATED ORAL at 10:12

## 2025-08-07 RX ADMIN — CEFTRIAXONE 1 G: 1 INJECTION, SOLUTION INTRAVENOUS at 14:56

## 2025-08-07 RX ADMIN — METOPROLOL SUCCINATE 100 MG: 100 TABLET, EXTENDED RELEASE ORAL at 10:13

## 2025-08-07 RX ADMIN — VALSARTAN 320 MG: 160 TABLET, FILM COATED ORAL at 10:13

## 2025-08-07 RX ADMIN — ANASTROZOLE 1 MG: 1 TABLET ORAL at 10:12

## 2025-08-07 RX ADMIN — QUETIAPINE FUMARATE 12.5 MG: 25 TABLET ORAL at 10:12

## 2025-08-07 RX ADMIN — ATORVASTATIN CALCIUM 10 MG: 20 TABLET, FILM COATED ORAL at 20:09

## 2025-08-07 ASSESSMENT — COGNITIVE AND FUNCTIONAL STATUS - GENERAL
MOVING FROM LYING ON BACK TO SITTING ON SIDE OF FLAT BED WITH BEDRAILS: A LOT
TURNING FROM BACK TO SIDE WHILE IN FLAT BAD: A LOT
DAILY ACTIVITIY SCORE: 13
STANDING UP FROM CHAIR USING ARMS: A LOT
TURNING FROM BACK TO SIDE WHILE IN FLAT BAD: A LOT
STANDING UP FROM CHAIR USING ARMS: A LOT
DRESSING REGULAR UPPER BODY CLOTHING: A LOT
MOBILITY SCORE: 10
MOBILITY SCORE: 13
MOVING TO AND FROM BED TO CHAIR: A LOT
CLIMB 3 TO 5 STEPS WITH RAILING: A LOT
HELP NEEDED FOR BATHING: A LOT
HELP NEEDED FOR BATHING: A LOT
MOVING TO AND FROM BED TO CHAIR: A LOT
DRESSING REGULAR LOWER BODY CLOTHING: A LOT
DRESSING REGULAR LOWER BODY CLOTHING: A LOT
EATING MEALS: A LITTLE
TOILETING: A LOT
PERSONAL GROOMING: A LOT
CLIMB 3 TO 5 STEPS WITH RAILING: TOTAL
MOVING FROM LYING ON BACK TO SITTING ON SIDE OF FLAT BED WITH BEDRAILS: A LITTLE
TOILETING: A LOT
WALKING IN HOSPITAL ROOM: A LOT
DRESSING REGULAR UPPER BODY CLOTHING: A LOT
WALKING IN HOSPITAL ROOM: TOTAL

## 2025-08-07 ASSESSMENT — PAIN - FUNCTIONAL ASSESSMENT
PAIN_FUNCTIONAL_ASSESSMENT: 0-10
PAIN_FUNCTIONAL_ASSESSMENT: 0-10

## 2025-08-07 ASSESSMENT — PAIN SCALES - GENERAL
PAINLEVEL_OUTOF10: 0 - NO PAIN
PAINLEVEL_OUTOF10: 0 - NO PAIN

## 2025-08-07 NOTE — PROGRESS NOTES
08/07/25 1111   Rapid Rounds   Attendance Provider;Care Transitions   Expected Discharge Disposition SNF   Today we still await: Placement process   Review at Escalation Rounds No escalation needed     PMR consult ordered for possible admission to [facility]. [facility] but won't have a bed available until the weekend or Monday. Will continue to follow for updates.      Malu Washington RN, BSN  Transitional Care Coordinator

## 2025-08-07 NOTE — CONSULTS
PM&R Consult Note  Patient: Ann Marie Murphy   Age/sex: 89 y.o.   Medical Record #: [id number]     Consulting physician: Dr. Handy Giles     Chief complaint:   Impairments and acitivities limitations in ADLs, mobility, functional communication, and cognition secondary to syncope (likely from NSTEMI vs vasovagal) resulting in fall.    PM&R was consulted for recommendations and for IRF appropriateness.    HPI:   Ann Marie Murphy is a 89 y.o. year old female patient with  h/o dementia, hypertension, HLD, seizure disorder, and breast cancer in remission who presents after an unwitnessed fall last night. She was brought in by EMS. Patient reports she was getting ready for bed last night and was going to the restroom when she tripped/had a misstep while using her walker and fell face forward. She then called out to her cousin, who is also her neighbor to help her off the floor however, she was not helped until the next morning. Patient lives alone in an apartment and her daughter helps her with ADL/iADLs.      Patient diagnosed with likely Type 2 NSTEMI based on elevated troponin and EKG findings. Also UTI treated with 5 days of CTX.     Procedures performed on/related to this admission:  -None    Home Situation/ Supports  Lives in: 4 family home  Stairs to enter: 14  Stairs in home: one level  Bed level: one level  Bathroom level: one level  Lives with: Alone  Who can help at home and how often: relatives live in the same building and able to check in, but are unable to be there 24/7    Prior Level of Function  -Level of Honor: Independent with ADLs and functional transfers, Independent with homemaking with ambulation  -Receives Help From: Family  -ADL Assistance: Independent (Dtr report pt may be in need for increased assist w/ ADLs but pt refuses offered assistance)  -Homemaking Assistance: Needs assistance (Family assists; per dtr pt able to make basic meals w/o assist)  -Ambulatory Assistance: Independent  (w/ rollator however does not always use in home)  -Assistive Devices: cane, rollator, and FWW    Physical Therapy  Last seen: 8/6  Bed mobility: Arian-ModA  Transfers: ModA with walker;   Ambulation: ModA with rolling walker; Narrow base of support, Diminished heel strike, Decreased step length, Shuffling gait (posterior lean); 2 trials x2 lateral steps towards HOB, cues for sequencing and assist for walker management.     Occupational Therapy  Last seen: 8/6  Putting on and taking off regular lower body clothing: A lot  Bathing (including washing, rinsing, drying): A lot  Putting on and taking off regular upper body clothing: A little  Toileting, which includes using toilet, bedpan or urinal: A lot  Taking care of personal grooming such as brushing teeth: A little  Eating Meals: A little      REVIEW OF SYSTEMS    Constitutional: Denies fever, chills, fatigue, appetite/weight change  HEENT: Denies hearing loss, tinnitus, voice change  Cardio: Denies chest pain, leg swelling, palpitations  Respiratory: Denies SOB, cough, wheezing  GI: Denies Abd pain, constipation, diarrhea, N/V  : Denies dysuria, urgency, decreased urination  MSK: Denies arthralgia, myalgia, joint swelling  Neuro: Denies numbness, dizziness, light-headedness  Skin: Denies color change, wound, rash  Psych: Denies anxiety, depression, hallucinations, agitation, sleep disturbance    OBJECTIVE    Patient Vitals for the past 24 hrs:   BP Temp Temp src Pulse Resp SpO2   08/08/25 1230 138/67 36.3 °C (97.3 °F) -- 84 18 97 %   08/08/25 0759 132/76 36.4 °C (97.5 °F) Temporal 74 16 98 %   08/08/25 0348 128/76 36.3 °C (97.3 °F) -- 74 17 92 %   08/08/25 0031 136/71 36.4 °C (97.5 °F) Temporal 78 16 95 %   08/07/25 1904 134/66 36.3 °C (97.3 °F) Temporal 76 16 94 %   08/07/25 1556 110/67 36.1 °C (97 °F) -- 74 17 93 %       Lab Results   Component Value Date    WBC 9.1 08/08/2025    HGB 13.0 08/08/2025    HCT 41.0 08/08/2025     (H) 08/08/2025      08/08/2025        Lab Results   Component Value Date    CREATININE 0.65 08/08/2025    BUN 25 (H) 08/08/2025     08/08/2025    K 3.8 08/08/2025     (H) 08/08/2025    CO2 23 08/08/2025        PHYSICAL EXAM    GENERAL: Awake and alert, well-developed, well-nourished, No acute distress  HEENT - NC/AT  CARDIOVASCULAR: extremities warm, well perfused  RESPIRATORY: no conversational dyspnea, symmetrical chest rise  ABDOMEN: Soft, non-tender, normal bowel sounds, no distention  MSK: No visible deformities or local swelling  SKIN: Normal color, warm, dry  Psych: Cooperative, tangential speech, affect appropriate, conversational, good-eye contact    NEURO: A&O x 2 (doesn't know where she is or why she's here but able to tell what month it is and the year)  RUE strength: 5/5 elbow flexors, 5/5 elbow extensors, 5/5 wrist extensors, 5/5 finger flexors, 5/5 finger abductors   LUE strength: 5/5 elbow flexors, 5/5 elbow extensors, 5/5 wrist extensors, 5/5 finger flexors, 5/5 finger abductors   RLE strength: 5/5 hip flexors, 5/5 knee extensors, 5/5 ankle dorsiflexors, 5/5 EHL, 5/5 ankle plantar flexors  LLE strength: 5/5 hip flexors, 5/5 knee extensors, 5/5 ankle dorsiflexors, 5/5 EHL, 5/5 ankle plantar flexors  Sensation - intact to light touch in bilateral upper and lower extremities.   Reflexes -  2+ biceps, brachioradialis, patellar reflexes bilaterally    IMPRESSION:  Ann Marie Murphy is a 89 y.o. year old female patient with Impairments and acitivities limitations in ADLs, mobility, functional communication, and cognition secondary to syncope (likely from NSTEMI vs vasovagal) resulting in fall.      Recommendations:  # Syncope likely 2/2 either to vasovagal episode or NSTEMI  # Hx of Dementia  - Continue all cardiac medications as indicated  - PO: Regular diet, thins  - DVT prophylaxis with lovenox 40mg q24h.   - Precautions: Elopement precautions with sitter in place; continue seroquel 12.5mg BID (home med)      # Impaired mobility and Impaired independence with ADLs and I/ADLs  - Continue PT, working on bed mobility, transfers, balance, endurance, strength, gait, eval for appropriate assistive device  - Continue OT, working on functional cognition, functional mobility, upper limb strength/coordination, balance, endurance, eval for appropriate adaptive equipment, ADLs, and I/ADLs.    # Dispo  - Would recommend discharge to SNF at this time. This patient will require 24/7 supervision at discharge and this would be the safest place for her to receive additional therapy while receiving the level of supervision she needs.  - For questions, please contact via [employer]    We will follow along with you. Thank you for the consult.    Elsie Barrientos, DO  Physical Medicine and Rehabilitation PGY-3  Available via [facility]     Patient seen and examined with attending Dr. Hadny Giles, who agrees with assessment and plan.     NOTE: This note is not finalized until attending reviews and signs.

## 2025-08-07 NOTE — PROGRESS NOTES
Ann Marie Murphy is a 89 y.o. female on day 5 of admission presenting with NSTEMI (non-ST elevated myocardial infarction) (Multi).      Subjective     Patient more oriented this morning but still has circumferential speech. Per daughter she was supposed to see neurology in February for these similar symptoms. She denies any chills, nausea, vomiting, chest pain     Objective     Last Recorded Vitals  /67   Pulse 74   Temp 36.1 °C (97 °F)   Resp 17   Wt 54.4 kg (120 lb)   SpO2 93%   Intake/Output last 3 Shifts:    Intake/Output Summary (Last 24 hours) at 8/7/2025 1648  Last data filed at 8/7/2025 1549  Gross per 24 hour   Intake 350 ml   Output --   Net 350 ml       Admission Weight  Weight: 54.4 kg (120 lb) (08/02/25 1126)    Daily Weight  08/02/25 : 54.4 kg (120 lb)    Image Results  Transthoracic Echo Complete     [facility], [address] [place] [address]                 Tel [phone] and Fax [phone]    TRANSTHORACIC ECHOCARDIOGRAM REPORT       Patient Name:       ANN MARIE MURPHY  Reading Physician:    [id number] Jacquie Hdz MD  Study Date:         8/4/2025            Ordering Provider:    [id number] ZOHREH RODRIGUEZ  MRN/PID:            [id number]            Fellow:  Accession#:         [id number]        Nurse:                Nayely Cooley RN  Date of Birth/Age:  1935 / 89      Sonographer:          Nathaly duron                                     DILLON  Gender assigned at  F                   Additional Staff:  Birth:  Height:             152.40 cm           Admit Date:  Weight:             54.43 kg            Admission Status:     Inpatient -                                                                Routine  BSA / BMI:          1.50 m2 / 23.44     Encounter#:           [id number]                       kg/m2  Blood Pressure:     134/69 mmHg         Department Location:  [facility]                                                                Non Invasive    Study Type:    TRANSTHORACIC ECHO (TTE) COMPLETE  Diagnosis/ICD: Syncope-R55  Indication:    Syncope  CPT Code:      Echo Complete w Full Doppler-91040    Patient History:  MI Location/Type:  Non-ST Elevation MI  Pertinent History: HTN, Hyperlipidemia and CVA. HOCM< LVH, Anemia, GERD.    Study Detail: The following Echo studies were performed: color flow, Doppler,                M-Mode and 2D. Technically challenging study due to body habitus                and Dementia. Optison used as a contrast agent for endocardial                border definition. Total contrast used for this procedure was 3.5                mL via IV push.       PHYSICIAN INTERPRETATION:  Left Ventricle: Left ventricular ejection fraction is normal by visual estimate at 70-75%. There is severe concentric left ventricular hypertrophy. There are no regional left ventricular wall motion abnormalities. The left ventricular cavity size is decreased. There is severely increased septal and moderately increased posterior left ventricular wall thickness. Spectral Doppler shows a Grade I (impaired relaxation pattern) of left ventricular diastolic filling with normal left atrial filling pressure.  Left Atrium: The left atrium is mildly dilated.  Right Ventricle: The right ventricle is normal in size. There is normal right ventricular global systolic function.  Right Atrium: The right atrium is normal in size.  Aortic Valve: The aortic valve is trileaflet. There is mild aortic valve thickening. There is no evidence of aortic valve regurgitation.  Mitral Valve: The mitral valve is mildly thickened. There is moderate mitral annular calcification. There is trace mitral valve regurgitation. The E Vmax is 0.73 m/s.  Tricuspid Valve: The tricuspid valve is structurally normal. There is trace tricuspid  regurgitation.  Pulmonic Valve: The pulmonic valve is structurally normal. There is no indication of pulmonic valve regurgitation.  Pericardium: Small pericardial effusion.  Aorta: The aortic root is normal. The Asc Ao is 3.20 cm. There is no dilatation of the ascending aorta.  Systemic Veins: The inferior vena cava appears small in size, with IVC inspiratory collapse greater than 50%.  In comparison to the previous echocardiogram(s): Compared with study dated 2/12/2020, no significant change.       CONCLUSIONS:   1. Left ventricular ejection fraction is normal by visual estimate at 70-75%.   2. Spectral Doppler shows a Grade I (impaired relaxation pattern) of left ventricular diastolic filling with normal left atrial filling pressure.   3. Left ventricular cavity size is decreased.   4. There is severely increased septal and moderately increased posterior left ventricular wall thickness.   5. There is severe concentric left ventricular hypertrophy.   6. There is normal right ventricular global systolic function.   7. The left atrium is mildly dilated.   8. Small pericardial effusion.   9. There is moderate mitral annular calcification.    RECOMMENDATIONS:  Utilizing an FDA cleared automated machine learning algorithm (EchoGo Heart Failure by [employer]), the analysis of the apical 4-chamber echocardiogram suggests the presence of heart failure with preserved ejection fraction (HFpEF)*. Clinical correlation looking for additional heart failure signs and symptoms is recommended, as a definite diagnosis of heart failure cannot be made by imaging alone.  *Per ACC/AHA/HFSA universal diagnosis of heart failure, HFpEF is defined as 1) signs and symptoms leading to clinical diagnosis of heart failure, 2) an ejection fraction of at least 50%, and 3) evidence of elevated intra-cardiac filling pressures by echocardiography, BNP elevation, or catheterization.     QUANTITATIVE DATA SUMMARY:     2D MEASUREMENTS:          Normal  Ranges:  Ao Root d:       3.40 cm  (2.0-3.7cm)  LAs:             3.45 cm  (2.7-4.0cm)  IVSd:            1.60 cm  (0.6-1.1cm)  LVPWd:           1.50 cm  (0.6-1.1cm)  LVIDd:           3.40 cm  (3.9-5.9cm)  LVIDs:           1.70 cm  LV Mass Index:   131 g/m2  LV % FS          50.0 %       LEFT ATRIUM:                  Normal Ranges:  LA Vol A4C:        35.0 ml    (22+/-6mL/m2)  LA Vol A2C:        53.8 ml  LA Vol BP:         45.9 ml  LA Vol Index A4C:  23.3ml/m2  LA Vol Index A2C:  35.8 ml/m2  LA Vol Index BP:   30.6 ml/m2  LA Area A4C:       14.5 cm2  LA Area A2C:       19.0 cm2  LA Major Axis A4C: 5.1 cm  LA Major Axis A2C: 5.7 cm  LA Volume Index:   35.8 ml/m2       AORTA MEASUREMENTS:         Normal Ranges:  Asc Ao, d:          3.20 cm (2.1-3.4cm)       LV SYSTOLIC FUNCTION:                       Normal Ranges:  EF-Visual:      73 %  LV EF Reported: 73 %       LV DIASTOLIC FUNCTION:             Normal Ranges:  MV Peak E:             0.73 m/s    (0.7-1.2 m/s)  MV Peak A:             1.11 m/s    (0.42-0.7 m/s)  E/A Ratio:             0.66        (1.0-2.2)  MV e'                  0.082 m/s   (>8.0)  MV lateral e'          0.12 m/s  MV medial e'           0.05 m/s  MV A Dur:              144.00 msec  E/e' Ratio:            8.82        (<8.0)  MV DT:                 250 msec    (150-240 msec)       MITRAL VALVE:          Normal Ranges:  MV DT:        250 msec (150-240msec)       AORTIC VALVE:           Normal Ranges:  AoV Vmax:      1.45 m/s (<=1.7m/s)  AoV Peak P.4 mmHg (<20mmHg)  LVOT Max Eugene:  1.33 m/s (<=1.1m/s)  LVOT VTI:      29.30 cm  LVOT Diameter: 2.00 cm  (1.8-2.4cm)  AoV Area,Vmax: 2.88 cm2 (2.5-4.5cm2)       RIGHT VENTRICLE:  RV Basal 3.10 cm  RV Mid   1.70 cm  RV Major 8.3 cm  RV s'    0.11 m/s       [id number] Jacquie Hdz MD  Electronically signed on 2025 at 7:52:05 AM       ** Final **      Physical Exam  Constitutional:       General: She is not in acute distress.     Appearance: She is not  toxic-appearing.   HENT:      Head: Normocephalic and atraumatic.      Mouth/Throat:      Mouth: Mucous membranes are moist.      Pharynx: No oropharyngeal exudate.     Eyes:      General: No scleral icterus.     Pupils: Pupils are equal, round, and reactive to light.       Cardiovascular:      Rate and Rhythm: Normal rate and regular rhythm.      Pulses: Normal pulses.      Heart sounds: No murmur heard.  Pulmonary:      Effort: Pulmonary effort is normal. No respiratory distress.      Breath sounds: No wheezing.   Abdominal:      General: Abdomen is flat. There is no distension.      Tenderness: There is no abdominal tenderness.     Musculoskeletal:      Right lower leg: No edema.      Left lower leg: No edema.     Skin:     General: Skin is warm.      Coloration: Skin is not jaundiced.     Neurological:      General: No focal deficit present.      Mental Status: She is oriented to person, place, and time. Mental status is at baseline.     Psychiatric:         Mood and Affect: Mood normal.         Behavior: Behavior normal.         Thought Content: Thought content normal.         Relevant Results    Scheduled medications  Scheduled Medications[1]  Continuous medications  Continuous Medications[2]  PRN medications  PRN Medications[3]     Results for orders placed or performed during the hospital encounter of 08/02/25 (from the past 24 hours)   Blood Culture    Specimen: Peripheral Venipuncture; Blood culture   Result Value Ref Range    Blood Culture Loaded on Instrument - Culture in progress    CBC and Auto Differential   Result Value Ref Range    WBC 8.4 4.4 - 11.3 x10*3/uL    nRBC 0.0 0.0 - 0.0 /100 WBCs    RBC 3.99 (L) 4.00 - 5.20 x10*6/uL    Hemoglobin 13.0 12.0 - 16.0 g/dL    Hematocrit 40.4 36.0 - 46.0 %     (H) 80 - 100 fL    MCH 32.6 26.0 - 34.0 pg    MCHC 32.2 32.0 - 36.0 g/dL    RDW 13.6 11.5 - 14.5 %    Platelets 195 150 - 450 x10*3/uL    Neutrophils % 60.2 40.0 - 80.0 %    Immature Granulocytes  %, Automated 0.5 0.0 - 0.9 %    Lymphocytes % 26.0 13.0 - 44.0 %    Monocytes % 11.2 2.0 - 10.0 %    Eosinophils % 1.6 0.0 - 6.0 %    Basophils % 0.5 0.0 - 2.0 %    Neutrophils Absolute 5.04 1.60 - 5.50 x10*3/uL    Immature Granulocytes Absolute, Automated 0.04 0.00 - 0.50 x10*3/uL    Lymphocytes Absolute 2.18 0.80 - 3.00 x10*3/uL    Monocytes Absolute 0.94 (H) 0.05 - 0.80 x10*3/uL    Eosinophils Absolute 0.13 0.00 - 0.40 x10*3/uL    Basophils Absolute 0.04 0.00 - 0.10 x10*3/uL   Renal Function Panel   Result Value Ref Range    Glucose 94 74 - 99 mg/dL    Sodium 143 136 - 145 mmol/L    Potassium 3.6 3.5 - 5.3 mmol/L    Chloride 111 (H) 98 - 107 mmol/L    Bicarbonate 25 21 - 32 mmol/L    Anion Gap 11 10 - 20 mmol/L    Urea Nitrogen 17 6 - 23 mg/dL    Creatinine 0.52 0.50 - 1.05 mg/dL    eGFR 89 >60 mL/min/1.73m*2    Calcium 9.9 8.6 - 10.6 mg/dL    Phosphorus 2.8 2.5 - 4.9 mg/dL    Albumin 3.6 3.4 - 5.0 g/dL   Magnesium   Result Value Ref Range    Magnesium 2.02 1.60 - 2.40 mg/dL                    Assessment & Plan  NSTEMI (non-ST elevated myocardial infarction) (Multi)    Pt is a 89 y.o. female with hx of right-sided invasive ductal carcinoma, clinical stage IB (T2 N0 M0),  dementia,  hypertension, HLD, seizure disorder who presents after an unwitnessed fall last night. Patient was admitted for further workup after syncope, fall and tropenmia.     #AMS likely 2/2 delirium  - Unclear etiology   - Will order infectious workup to rule out metabolic cause   - Likely delirium in the setting of possible dementia - if mental status continues to worsen will consider neurology consult     #Syncope  #Fall  - Ddx: Orthostasis 2/2 hypovolemia in setting of UTI vs Vasovagal vs Cardiac/Structural - imaging unremarkable including CTH, cervical spine, maxillofacial bones w/o con, and XR pelvis   - ECHO concerning severely increased septal and moderately increased posterior left ventricular wall thickness.   Plan  - PMR consult   -  PT-Rec SNF  - Tylenol prn for pain   - Orthostats in the AM      #Uncomplicated UTI  -Ucx-Clinically insignificant growth based on current clinical standards.   -CTX x 5 days - today is last day       #NSTEMI likely Type 2   -EKG NSR w/o ST elevation   - A1C 5.6%,   - trops 150 -> 159 -> 154, will stop trending  PLAN:  -S tat EKG and ACS workup/management if endorsing chest pain  - C/h lipitor 10 mg and ASA 81 mg daily   - C/h metop succinate 100 mg daily      #Chronic conditions  - HTN: c/h valsartan 320 mg daily   - Dementia: c/h sertraline 25 mg and seroquel 12.5 mg daily   - Seizure disorder: c/h keppra 500 mg BID   - H/o breast carcinoma in remission: c/h anastrozole      F : PRN  E: PRN  N: Adult diet Regular  A:  PIV  DVT prophylaxis: Lovenox subq     Code Status: Full Code (confirmed on admission)   NOK: Extended Emergency Contact Information  Primary Emergency Contact: Mary Sebastian  Home Phone: [phone]  Mobile Phone: [phone]  Relation: Daughter  Secondary Emergency Contact: BarryMicah   [place]  Mobile Phone: [phone]  Relation: Daughter     DISPO: SNF medical optimization   FOLLOW UPS: PCP    Christine Byrd MD               [1] anastrozole, 1 mg, oral, Daily  aspirin, 81 mg, oral, Daily  atorvastatin, 10 mg, oral, Nightly  enoxaparin, 40 mg, subcutaneous, q24h  levETIRAcetam, 500 mg, oral, BID  metoprolol succinate XL, 100 mg, oral, Daily  QUEtiapine, 12.5 mg, oral, BID  sennosides-docusate sodium, 2 tablet, oral, BID  sertraline, 25 mg, oral, Daily  valsartan, 320 mg, oral, Daily     [2]    [3] PRN medications: acetaminophen

## 2025-08-07 NOTE — CARE PLAN
The patient's goals for the shift include      The clinical goals for the shift include Patient will be free from fall/injury during the shift.      Problem: Pain - Adult  Goal: Verbalizes/displays adequate comfort level or baseline comfort level  Outcome: Progressing     Problem: Safety - Adult  Goal: Free from fall injury  Outcome: Progressing     Problem: Discharge Planning  Goal: Discharge to home or other facility with appropriate resources  Outcome: Progressing     Problem: Chronic Conditions and Co-morbidities  Goal: Patient's chronic conditions and co-morbidity symptoms are monitored and maintained or improved  Outcome: Progressing     Problem: Skin  Goal: Decreased wound size/increased tissue granulation at next dressing change  Flowsheets (Taken 8/7/2025 1402)  Decreased wound size/increased tissue granulation at next dressing change: Protective dressings over bony prominences  Goal: Participates in plan/prevention/treatment measures  Flowsheets (Taken 8/7/2025 1402)  Participates in plan/prevention/treatment measures: Elevate heels  Goal: Prevent/manage excess moisture  Flowsheets (Taken 8/7/2025 1402)  Prevent/manage excess moisture:   Monitor for/manage infection if present   Moisturize dry skin   Cleanse incontinence/protect with barrier cream  Goal: Prevent/minimize sheer/friction injuries  Flowsheets (Taken 8/7/2025 1402)  Prevent/minimize sheer/friction injuries:   Turn/reposition every 2 hours/use positioning/transfer devices   HOB 30 degrees or less  Goal: Promote/optimize nutrition  Flowsheets (Taken 8/7/2025 1402)  Promote/optimize nutrition:   Offer water/supplements/favorite foods   Monitor/record intake including meals     Problem: Fall/Injury  Goal: Not fall by end of shift  Outcome: Progressing  Goal: Be free from injury by end of the shift  Outcome: Progressing

## 2025-08-08 LAB
ALBUMIN SERPL BCP-MCNC: 3.8 G/DL (ref 3.4–5)
ANION GAP SERPL CALC-SCNC: 13 MMOL/L (ref 10–20)
BASOPHILS # BLD AUTO: 0.05 X10*3/UL (ref 0–0.1)
BASOPHILS NFR BLD AUTO: 0.6 %
BUN SERPL-MCNC: 25 MG/DL (ref 6–23)
CALCIUM SERPL-MCNC: 10.2 MG/DL (ref 8.6–10.6)
CHLORIDE SERPL-SCNC: 109 MMOL/L (ref 98–107)
CO2 SERPL-SCNC: 23 MMOL/L (ref 21–32)
CREAT SERPL-MCNC: 0.65 MG/DL (ref 0.5–1.05)
EGFRCR SERPLBLD CKD-EPI 2021: 84 ML/MIN/1.73M*2
EOSINOPHIL # BLD AUTO: 0.06 X10*3/UL (ref 0–0.4)
EOSINOPHIL NFR BLD AUTO: 0.7 %
ERYTHROCYTE [DISTWIDTH] IN BLOOD BY AUTOMATED COUNT: 13.6 % (ref 11.5–14.5)
GLUCOSE BLD MANUAL STRIP-MCNC: 133 MG/DL (ref 74–99)
GLUCOSE SERPL-MCNC: 120 MG/DL (ref 74–99)
HCT VFR BLD AUTO: 41 % (ref 36–46)
HGB BLD-MCNC: 13 G/DL (ref 12–16)
IMM GRANULOCYTES # BLD AUTO: 0.05 X10*3/UL (ref 0–0.5)
IMM GRANULOCYTES NFR BLD AUTO: 0.6 % (ref 0–0.9)
LYMPHOCYTES # BLD AUTO: 1.77 X10*3/UL (ref 0.8–3)
LYMPHOCYTES NFR BLD AUTO: 19.6 %
MAGNESIUM SERPL-MCNC: 2 MG/DL (ref 1.6–2.4)
MCH RBC QN AUTO: 32.7 PG (ref 26–34)
MCHC RBC AUTO-ENTMCNC: 31.7 G/DL (ref 32–36)
MCV RBC AUTO: 103 FL (ref 80–100)
MONOCYTES # BLD AUTO: 0.58 X10*3/UL (ref 0.05–0.8)
MONOCYTES NFR BLD AUTO: 6.4 %
NEUTROPHILS # BLD AUTO: 6.54 X10*3/UL (ref 1.6–5.5)
NEUTROPHILS NFR BLD AUTO: 72.1 %
NRBC BLD-RTO: 0 /100 WBCS (ref 0–0)
PHOSPHATE SERPL-MCNC: 2.4 MG/DL (ref 2.5–4.9)
PLATELET # BLD AUTO: 204 X10*3/UL (ref 150–450)
POTASSIUM SERPL-SCNC: 3.8 MMOL/L (ref 3.5–5.3)
RBC # BLD AUTO: 3.97 X10*6/UL (ref 4–5.2)
SODIUM SERPL-SCNC: 141 MMOL/L (ref 136–145)
WBC # BLD AUTO: 9.1 X10*3/UL (ref 4.4–11.3)

## 2025-08-08 PROCEDURE — 83735 ASSAY OF MAGNESIUM: CPT

## 2025-08-08 PROCEDURE — 85025 COMPLETE CBC W/AUTO DIFF WBC: CPT

## 2025-08-08 PROCEDURE — 2500000001 HC RX 250 WO HCPCS SELF ADMINISTERED DRUGS (ALT 637 FOR MEDICARE OP)

## 2025-08-08 PROCEDURE — 80069 RENAL FUNCTION PANEL: CPT

## 2025-08-08 PROCEDURE — 1210000001 HC SEMI-PRIVATE ROOM DAILY

## 2025-08-08 PROCEDURE — 36415 COLL VENOUS BLD VENIPUNCTURE: CPT

## 2025-08-08 PROCEDURE — 2500000004 HC RX 250 GENERAL PHARMACY W/ HCPCS (ALT 636 FOR OP/ED)

## 2025-08-08 PROCEDURE — 82947 ASSAY GLUCOSE BLOOD QUANT: CPT

## 2025-08-08 PROCEDURE — 99222 1ST HOSP IP/OBS MODERATE 55: CPT | Performed by: STUDENT IN AN ORGANIZED HEALTH CARE EDUCATION/TRAINING PROGRAM

## 2025-08-08 PROCEDURE — 2500000002 HC RX 250 W HCPCS SELF ADMINISTERED DRUGS (ALT 637 FOR MEDICARE OP, ALT 636 FOR OP/ED)

## 2025-08-08 PROCEDURE — 99232 SBSQ HOSP IP/OBS MODERATE 35: CPT | Performed by: STUDENT IN AN ORGANIZED HEALTH CARE EDUCATION/TRAINING PROGRAM

## 2025-08-08 RX ADMIN — SERTRALINE HYDROCHLORIDE 25 MG: 25 TABLET ORAL at 08:51

## 2025-08-08 RX ADMIN — LEVETIRACETAM 500 MG: 500 TABLET, FILM COATED ORAL at 21:14

## 2025-08-08 RX ADMIN — SENNOSIDES AND DOCUSATE SODIUM 2 TABLET: 50; 8.6 TABLET ORAL at 08:47

## 2025-08-08 RX ADMIN — ASPIRIN 81 MG: 81 TABLET, COATED ORAL at 08:47

## 2025-08-08 RX ADMIN — LEVETIRACETAM 500 MG: 500 TABLET, FILM COATED ORAL at 08:47

## 2025-08-08 RX ADMIN — METOPROLOL SUCCINATE 100 MG: 100 TABLET, EXTENDED RELEASE ORAL at 08:51

## 2025-08-08 RX ADMIN — VALSARTAN 320 MG: 160 TABLET, FILM COATED ORAL at 08:51

## 2025-08-08 RX ADMIN — QUETIAPINE FUMARATE 12.5 MG: 25 TABLET ORAL at 08:52

## 2025-08-08 RX ADMIN — SENNOSIDES AND DOCUSATE SODIUM 2 TABLET: 50; 8.6 TABLET ORAL at 21:14

## 2025-08-08 RX ADMIN — QUETIAPINE FUMARATE 12.5 MG: 25 TABLET ORAL at 21:14

## 2025-08-08 RX ADMIN — ATORVASTATIN CALCIUM 10 MG: 20 TABLET, FILM COATED ORAL at 21:14

## 2025-08-08 RX ADMIN — ANASTROZOLE 1 MG: 1 TABLET ORAL at 08:53

## 2025-08-08 ASSESSMENT — COGNITIVE AND FUNCTIONAL STATUS - GENERAL
HELP NEEDED FOR BATHING: A LOT
DRESSING REGULAR UPPER BODY CLOTHING: A LOT
EATING MEALS: A LITTLE
DAILY ACTIVITIY SCORE: 13
MOBILITY SCORE: 13
STANDING UP FROM CHAIR USING ARMS: A LOT
WALKING IN HOSPITAL ROOM: A LOT
DRESSING REGULAR LOWER BODY CLOTHING: A LOT
PERSONAL GROOMING: A LOT
TOILETING: A LOT
TURNING FROM BACK TO SIDE WHILE IN FLAT BAD: A LOT
CLIMB 3 TO 5 STEPS WITH RAILING: A LOT
MOVING FROM LYING ON BACK TO SITTING ON SIDE OF FLAT BED WITH BEDRAILS: A LITTLE
MOVING TO AND FROM BED TO CHAIR: A LOT

## 2025-08-08 ASSESSMENT — PAIN - FUNCTIONAL ASSESSMENT: PAIN_FUNCTIONAL_ASSESSMENT: 0-10

## 2025-08-08 ASSESSMENT — PAIN SCALES - GENERAL: PAINLEVEL_OUTOF10: 0 - NO PAIN

## 2025-08-08 NOTE — PROGRESS NOTES
Ann Marie Murphy is a 89 y.o. female on day 6 of admission presenting with NSTEMI (non-ST elevated myocardial infarction) (Multi).      Subjective     Patient much more oriented this morning and can tell me where she is and why she is in the hospital. Still has circumferential speech but appears to be chronic. She denies any chest pain, nausea, vomiting, fever or chills.     Objective     Last Recorded Vitals  /64 (BP Location: Right arm, Patient Position: Lying)   Pulse 78   Temp 36.3 °C (97.3 °F) (Temporal)   Resp 16   Wt 54.4 kg (120 lb)   SpO2 97%   Intake/Output last 3 Shifts:    Intake/Output Summary (Last 24 hours) at 8/8/2025 1835  Last data filed at 8/8/2025 1300  Gross per 24 hour   Intake 490 ml   Output 950 ml   Net -460 ml       Admission Weight  Weight: 54.4 kg (120 lb) (08/02/25 1126)    Daily Weight  08/02/25 : 54.4 kg (120 lb)    Image Results  Transthoracic Echo Complete     [facility], [address] [place] [address]                 Tel [phone] and Fax [phone]    TRANSTHORACIC ECHOCARDIOGRAM REPORT       Patient Name:       ANN MARIE MURPHY  Reading Physician:    [id number] Jacquie Hdz MD  Study Date:         8/4/2025            Ordering Provider:    [id number] ZOHREH RODRIGUEZ  MRN/PID:            [id number]            Fellow:  Accession#:         [id number]        Nurse:                Nayely Cooley RN  Date of Birth/Age:  1935 / 89      Sonographer:          Nathaly duron                                     [facility]  Gender assigned at  F                   Additional Staff:  Birth:  Height:             152.40 cm           Admit Date:  Weight:             54.43 kg            Admission Status:     Inpatient -                                                                Routine  BSA / BMI:           1.50 m2 / 23.44     Encounter#:           [id number]                      kg/m2  Blood Pressure:     134/69 mmHg         Department Location:  [facility]                                                                Non Invasive    Study Type:    TRANSTHORACIC ECHO (TTE) COMPLETE  Diagnosis/ICD: Syncope-R55  Indication:    Syncope  CPT Code:      Echo Complete w Full [id number]    Patient History:  MI Location/Type:  Non-ST Elevation MI  Pertinent History: HTN, Hyperlipidemia and CVA. HOCM< LVH, Anemia, GERD.    Study Detail: The following Echo studies were performed: color flow, Doppler,                M-Mode and 2D. Technically challenging study due to body habitus                and Dementia. Optison used as a contrast agent for endocardial                border definition. Total contrast used for this procedure was 3.5                mL via IV push.       PHYSICIAN INTERPRETATION:  Left Ventricle: Left ventricular ejection fraction is normal by visual estimate at 70-75%. There is severe concentric left ventricular hypertrophy. There are no regional left ventricular wall motion abnormalities. The left ventricular cavity size is decreased. There is severely increased septal and moderately increased posterior left ventricular wall thickness. Spectral Doppler shows a Grade I (impaired relaxation pattern) of left ventricular diastolic filling with normal left atrial filling pressure.  Left Atrium: The left atrium is mildly dilated.  Right Ventricle: The right ventricle is normal in size. There is normal right ventricular global systolic function.  Right Atrium: The right atrium is normal in size.  Aortic Valve: The aortic valve is trileaflet. There is mild aortic valve thickening. There is no evidence of aortic valve regurgitation.  Mitral Valve: The mitral valve is mildly thickened. There is moderate mitral annular calcification. There is trace mitral valve regurgitation. The E Vmax is 0.73 m/s.  Tricuspid  Valve: The tricuspid valve is structurally normal. There is trace tricuspid regurgitation.  Pulmonic Valve: The pulmonic valve is structurally normal. There is no indication of pulmonic valve regurgitation.  Pericardium: Small pericardial effusion.  Aorta: The aortic root is normal. The Asc Ao is 3.20 cm. There is no dilatation of the ascending aorta.  Systemic Veins: The inferior vena cava appears small in size, with IVC inspiratory collapse greater than 50%.  In comparison to the previous echocardiogram(s): Compared with study dated 2/12/2020, no significant change.       CONCLUSIONS:   1. Left ventricular ejection fraction is normal by visual estimate at 70-75%.   2. Spectral Doppler shows a Grade I (impaired relaxation pattern) of left ventricular diastolic filling with normal left atrial filling pressure.   3. Left ventricular cavity size is decreased.   4. There is severely increased septal and moderately increased posterior left ventricular wall thickness.   5. There is severe concentric left ventricular hypertrophy.   6. There is normal right ventricular global systolic function.   7. The left atrium is mildly dilated.   8. Small pericardial effusion.   9. There is moderate mitral annular calcification.    RECOMMENDATIONS:  Utilizing an FDA cleared automated machine learning algorithm (EchoGo Heart Failure by [employer]), the analysis of the apical 4-chamber echocardiogram suggests the presence of heart failure with preserved ejection fraction (HFpEF)*. Clinical correlation looking for additional heart failure signs and symptoms is recommended, as a definite diagnosis of heart failure cannot be made by imaging alone.  *Per ACC/AHA/HFSA universal diagnosis of heart failure, HFpEF is defined as 1) signs and symptoms leading to clinical diagnosis of heart failure, 2) an ejection fraction of at least 50%, and 3) evidence of elevated intra-cardiac filling pressures by echocardiography, BNP elevation, or  catheterization.     QUANTITATIVE DATA SUMMARY:     2D MEASUREMENTS:          Normal Ranges:  Ao Root d:       3.40 cm  (2.0-3.7cm)  LAs:             3.45 cm  (2.7-4.0cm)  IVSd:            1.60 cm  (0.6-1.1cm)  LVPWd:           1.50 cm  (0.6-1.1cm)  LVIDd:           3.40 cm  (3.9-5.9cm)  LVIDs:           1.70 cm  LV Mass Index:   131 g/m2  LV % FS          50.0 %       LEFT ATRIUM:                  Normal Ranges:  LA Vol A4C:        35.0 ml    (22+/-6mL/m2)  LA Vol A2C:        53.8 ml  LA Vol BP:         45.9 ml  LA Vol Index A4C:  23.3ml/m2  LA Vol Index A2C:  35.8 ml/m2  LA Vol Index BP:   30.6 ml/m2  LA Area A4C:       14.5 cm2  LA Area A2C:       19.0 cm2  LA Major Axis A4C: 5.1 cm  LA Major Axis A2C: 5.7 cm  LA Volume Index:   35.8 ml/m2       AORTA MEASUREMENTS:         Normal Ranges:  Asc Ao, d:          3.20 cm (2.1-3.4cm)       LV SYSTOLIC FUNCTION:                       Normal Ranges:  EF-Visual:      73 %  LV EF Reported: 73 %       LV DIASTOLIC FUNCTION:             Normal Ranges:  MV Peak E:             0.73 m/s    (0.7-1.2 m/s)  MV Peak A:             1.11 m/s    (0.42-0.7 m/s)  E/A Ratio:             0.66        (1.0-2.2)  MV e'                  0.082 m/s   (>8.0)  MV lateral e'          0.12 m/s  MV medial e'           0.05 m/s  MV A Dur:              144.00 msec  E/e' Ratio:            8.82        (<8.0)  MV DT:                 250 msec    (150-240 msec)       MITRAL VALVE:          Normal Ranges:  MV DT:        250 msec (150-240msec)       AORTIC VALVE:           Normal Ranges:  AoV Vmax:      1.45 m/s (<=1.7m/s)  AoV Peak P.4 mmHg (<20mmHg)  LVOT Max Eugene:  1.33 m/s (<=1.1m/s)  LVOT VTI:      29.30 cm  LVOT Diameter: 2.00 cm  (1.8-2.4cm)  AoV Area,Vmax: 2.88 cm2 (2.5-4.5cm2)       RIGHT VENTRICLE:  RV Basal 3.10 cm  RV Mid   1.70 cm  RV Major 8.3 cm  RV s'    0.11 m/s       [id number] Jacquie Hdz MD  Electronically signed on 2025 at 7:52:05 AM       ** Final **      Physical  Exam  Constitutional:       General: She is not in acute distress.     Appearance: She is not toxic-appearing.   HENT:      Head: Normocephalic and atraumatic.      Mouth/Throat:      Mouth: Mucous membranes are moist.      Pharynx: No oropharyngeal exudate.     Eyes:      General: No scleral icterus.     Pupils: Pupils are equal, round, and reactive to light.       Cardiovascular:      Rate and Rhythm: Normal rate and regular rhythm.      Pulses: Normal pulses.      Heart sounds: No murmur heard.  Pulmonary:      Effort: Pulmonary effort is normal. No respiratory distress.      Breath sounds: No wheezing.   Abdominal:      General: Abdomen is flat. There is no distension.      Tenderness: There is no abdominal tenderness.     Musculoskeletal:      Right lower leg: No edema.      Left lower leg: No edema.     Skin:     General: Skin is warm.      Coloration: Skin is not jaundiced.     Neurological:      General: No focal deficit present.      Mental Status: She is oriented to person, place, and time. Mental status is at baseline.     Psychiatric:         Mood and Affect: Mood normal.         Behavior: Behavior normal.         Thought Content: Thought content normal.         Relevant Results    Scheduled medications  Scheduled Medications[1]  Continuous medications  Continuous Medications[2]  PRN medications  PRN Medications[3]     Results for orders placed or performed during the hospital encounter of 08/02/25 (from the past 24 hours)   CBC and Auto Differential   Result Value Ref Range    WBC 9.1 4.4 - 11.3 x10*3/uL    nRBC 0.0 0.0 - 0.0 /100 WBCs    RBC 3.97 (L) 4.00 - 5.20 x10*6/uL    Hemoglobin 13.0 12.0 - 16.0 g/dL    Hematocrit 41.0 36.0 - 46.0 %     (H) 80 - 100 fL    MCH 32.7 26.0 - 34.0 pg    MCHC 31.7 (L) 32.0 - 36.0 g/dL    RDW 13.6 11.5 - 14.5 %    Platelets 204 150 - 450 x10*3/uL    Neutrophils % 72.1 40.0 - 80.0 %    Immature Granulocytes %, Automated 0.6 0.0 - 0.9 %    Lymphocytes % 19.6 13.0 -  44.0 %    Monocytes % 6.4 2.0 - 10.0 %    Eosinophils % 0.7 0.0 - 6.0 %    Basophils % 0.6 0.0 - 2.0 %    Neutrophils Absolute 6.54 (H) 1.60 - 5.50 x10*3/uL    Immature Granulocytes Absolute, Automated 0.05 0.00 - 0.50 x10*3/uL    Lymphocytes Absolute 1.77 0.80 - 3.00 x10*3/uL    Monocytes Absolute 0.58 0.05 - 0.80 x10*3/uL    Eosinophils Absolute 0.06 0.00 - 0.40 x10*3/uL    Basophils Absolute 0.05 0.00 - 0.10 x10*3/uL   Renal Function Panel   Result Value Ref Range    Glucose 120 (H) 74 - 99 mg/dL    Sodium 141 136 - 145 mmol/L    Potassium 3.8 3.5 - 5.3 mmol/L    Chloride 109 (H) 98 - 107 mmol/L    Bicarbonate 23 21 - 32 mmol/L    Anion Gap 13 10 - 20 mmol/L    Urea Nitrogen 25 (H) 6 - 23 mg/dL    Creatinine 0.65 0.50 - 1.05 mg/dL    eGFR 84 >60 mL/min/1.73m*2    Calcium 10.2 8.6 - 10.6 mg/dL    Phosphorus 2.4 (L) 2.5 - 4.9 mg/dL    Albumin 3.8 3.4 - 5.0 g/dL   Magnesium   Result Value Ref Range    Magnesium 2.00 1.60 - 2.40 mg/dL        Assessment & Plan  NSTEMI (non-ST elevated myocardial infarction) (Multi)    Pt is a 89 y.o. female with hx of right-sided invasive ductal carcinoma, clinical stage IB (T2 N0 M0),  dementia,  hypertension, HLD, seizure disorder who presents after an unwitnessed fall last night. Patient was admitted for further workup after syncope, fall and tropenmia.     #AMS likely 2/2 delirium  - Unclear etiology - improved today   - Infectious workup negative   - Likely delirium in the setting of possible dementia - if mental status continues to worsen will consider neurology consult     #Syncope  #Fall  - Ddx: Orthostasis 2/2 hypovolemia in setting of UTI vs Vasovagal vs Cardiac/Structural - imaging unremarkable including CTH, cervical spine, maxillofacial bones w/o con, and XR pelvis   - ECHO concerning severely increased septal and moderately increased posterior left ventricular wall thickness.   Plan  - PMR consult - recommend SNF; SW has started the process  - Tylenol prn for pain      #Uncomplicated UTI  -Ucx-Clinically insignificant growth based on current clinical standards.   -Completed 5 day course of CTX       #NSTEMI likely Type 2 - resolved   -EKG NSR w/o ST elevation   - A1C 5.6%,   - trops 150 -> 159 -> 154, will stop trending  PLAN:  - Stat EKG and ACS workup/management if endorsing chest pain  - Continue lipitor 10 mg and ASA 81 mg daily   - Continue metop succinate 100 mg daily      #Chronic conditions  - HTN:  valsartan 320 mg daily   - Dementia: sertraline 25 mg and seroquel 12.5 mg daily   - Seizure disorder: keppra 500 mg BID   - H/o breast carcinoma in remission: c/h anastrozole      F : PRN  E: PRN  N: Adult diet Regular  A:  PIV  DVT prophylaxis: Lovenox subq     Code Status: Full Code (confirmed on admission)   NOK: Extended Emergency Contact Information  Primary Emergency Contact: Mary Sebastian  Exline Phone: [phone]  Mobile Phone: [phone]  Relation: Daughter  Secondary Emergency Contact: Micah Reddy   [place]  Mobile Phone: [phone]  Relation: Daughter     DISPO: [facility] medical optimization   FOLLOW UPS: PCP    Christine Byrd MD                 [1] anastrozole, 1 mg, oral, Daily  aspirin, 81 mg, oral, Daily  atorvastatin, 10 mg, oral, Nightly  enoxaparin, 40 mg, subcutaneous, q24h  levETIRAcetam, 500 mg, oral, BID  metoprolol succinate XL, 100 mg, oral, Daily  QUEtiapine, 12.5 mg, oral, BID  sennosides-docusate sodium, 2 tablet, oral, BID  sertraline, 25 mg, oral, Daily  valsartan, 320 mg, oral, Daily     [2]    [3] PRN medications: acetaminophen

## 2025-08-08 NOTE — PROGRESS NOTES
08/08/25 1040   Rapid Rounds   Attendance Provider;Care Transitions   Expected Discharge Disposition SNF   Today we still await: Clinical stability;Consultant recommendations (Comment)  (pending PMR consult)   Review at Escalation Rounds No escalation needed       PMR evaluated the patient and is recommending SNF. Also discussed with daughter and she feels eventually patient will need long term care. She is agreeable with SNF. [facility] should have a bed on Monday and that is the daughter facility of choice. Will continue to follow for updates.      Malu Washington RN, BSN  Transitional Care Coordinator

## 2025-08-08 NOTE — CARE PLAN
The patient's goals for the shift include      The clinical goals for the shift include patient will be free from fall/injury during the shift.      Problem: Pain - Adult  Goal: Verbalizes/displays adequate comfort level or baseline comfort level  Outcome: Progressing     Problem: Safety - Adult  Goal: Free from fall injury  Outcome: Progressing     Problem: Discharge Planning  Goal: Discharge to home or other facility with appropriate resources  Outcome: Progressing     Problem: Chronic Conditions and Co-morbidities  Goal: Patient's chronic conditions and co-morbidity symptoms are monitored and maintained or improved  Outcome: Progressing     Problem: Skin  Goal: Participates in plan/prevention/treatment measures  Flowsheets (Taken 8/8/2025 1437)  Participates in plan/prevention/treatment measures: Elevate heels  Goal: Prevent/manage excess moisture  Flowsheets (Taken 8/8/2025 1437)  Prevent/manage excess moisture:   Monitor for/manage infection if present   Moisturize dry skin   Cleanse incontinence/protect with barrier cream  Goal: Prevent/minimize sheer/friction injuries  Flowsheets (Taken 8/8/2025 1437)  Prevent/minimize sheer/friction injuries: HOB 30 degrees or less  Goal: Promote/optimize nutrition  Flowsheets (Taken 8/8/2025 1437)  Promote/optimize nutrition:   Offer water/supplements/favorite foods   Monitor/record intake including meals     Problem: Fall/Injury  Goal: Not fall by end of shift  Outcome: Progressing  Goal: Be free from injury by end of the shift  Outcome: Progressing

## 2025-08-09 LAB
ALBUMIN SERPL BCP-MCNC: 3.5 G/DL (ref 3.4–5)
ANION GAP SERPL CALC-SCNC: 12 MMOL/L (ref 10–20)
BASOPHILS # BLD AUTO: 0.04 X10*3/UL (ref 0–0.1)
BASOPHILS NFR BLD AUTO: 0.4 %
BUN SERPL-MCNC: 29 MG/DL (ref 6–23)
CALCIUM SERPL-MCNC: 10 MG/DL (ref 8.6–10.6)
CHLORIDE SERPL-SCNC: 110 MMOL/L (ref 98–107)
CO2 SERPL-SCNC: 23 MMOL/L (ref 21–32)
CREAT SERPL-MCNC: 0.63 MG/DL (ref 0.5–1.05)
EGFRCR SERPLBLD CKD-EPI 2021: 85 ML/MIN/1.73M*2
EOSINOPHIL # BLD AUTO: 0.06 X10*3/UL (ref 0–0.4)
EOSINOPHIL NFR BLD AUTO: 0.6 %
ERYTHROCYTE [DISTWIDTH] IN BLOOD BY AUTOMATED COUNT: 13.3 % (ref 11.5–14.5)
GLUCOSE SERPL-MCNC: 114 MG/DL (ref 74–99)
HCT VFR BLD AUTO: 38.4 % (ref 36–46)
HGB BLD-MCNC: 12.2 G/DL (ref 12–16)
IMM GRANULOCYTES # BLD AUTO: 0.07 X10*3/UL (ref 0–0.5)
IMM GRANULOCYTES NFR BLD AUTO: 0.7 % (ref 0–0.9)
LYMPHOCYTES # BLD AUTO: 1.95 X10*3/UL (ref 0.8–3)
LYMPHOCYTES NFR BLD AUTO: 20.2 %
MAGNESIUM SERPL-MCNC: 2.04 MG/DL (ref 1.6–2.4)
MCH RBC QN AUTO: 32.3 PG (ref 26–34)
MCHC RBC AUTO-ENTMCNC: 31.8 G/DL (ref 32–36)
MCV RBC AUTO: 102 FL (ref 80–100)
MONOCYTES # BLD AUTO: 0.71 X10*3/UL (ref 0.05–0.8)
MONOCYTES NFR BLD AUTO: 7.4 %
NEUTROPHILS # BLD AUTO: 6.82 X10*3/UL (ref 1.6–5.5)
NEUTROPHILS NFR BLD AUTO: 70.7 %
NRBC BLD-RTO: 0 /100 WBCS (ref 0–0)
PHOSPHATE SERPL-MCNC: 2.4 MG/DL (ref 2.5–4.9)
PLATELET # BLD AUTO: 182 X10*3/UL (ref 150–450)
POTASSIUM SERPL-SCNC: 3.5 MMOL/L (ref 3.5–5.3)
RBC # BLD AUTO: 3.78 X10*6/UL (ref 4–5.2)
SODIUM SERPL-SCNC: 141 MMOL/L (ref 136–145)
WBC # BLD AUTO: 9.7 X10*3/UL (ref 4.4–11.3)

## 2025-08-09 PROCEDURE — 1210000001 HC SEMI-PRIVATE ROOM DAILY

## 2025-08-09 PROCEDURE — 2500000004 HC RX 250 GENERAL PHARMACY W/ HCPCS (ALT 636 FOR OP/ED)

## 2025-08-09 PROCEDURE — 36415 COLL VENOUS BLD VENIPUNCTURE: CPT

## 2025-08-09 PROCEDURE — 2500000002 HC RX 250 W HCPCS SELF ADMINISTERED DRUGS (ALT 637 FOR MEDICARE OP, ALT 636 FOR OP/ED)

## 2025-08-09 PROCEDURE — 80069 RENAL FUNCTION PANEL: CPT

## 2025-08-09 PROCEDURE — 99232 SBSQ HOSP IP/OBS MODERATE 35: CPT | Performed by: STUDENT IN AN ORGANIZED HEALTH CARE EDUCATION/TRAINING PROGRAM

## 2025-08-09 PROCEDURE — 83735 ASSAY OF MAGNESIUM: CPT

## 2025-08-09 PROCEDURE — 85025 COMPLETE CBC W/AUTO DIFF WBC: CPT

## 2025-08-09 PROCEDURE — 2500000001 HC RX 250 WO HCPCS SELF ADMINISTERED DRUGS (ALT 637 FOR MEDICARE OP)

## 2025-08-09 RX ADMIN — QUETIAPINE FUMARATE 12.5 MG: 25 TABLET ORAL at 09:30

## 2025-08-09 RX ADMIN — QUETIAPINE FUMARATE 12.5 MG: 25 TABLET ORAL at 20:31

## 2025-08-09 RX ADMIN — VALSARTAN 320 MG: 160 TABLET, FILM COATED ORAL at 09:31

## 2025-08-09 RX ADMIN — METOPROLOL SUCCINATE 100 MG: 100 TABLET, EXTENDED RELEASE ORAL at 09:30

## 2025-08-09 RX ADMIN — ANASTROZOLE 1 MG: 1 TABLET ORAL at 09:59

## 2025-08-09 RX ADMIN — SENNOSIDES AND DOCUSATE SODIUM 2 TABLET: 50; 8.6 TABLET ORAL at 09:30

## 2025-08-09 RX ADMIN — LEVETIRACETAM 500 MG: 500 TABLET, FILM COATED ORAL at 09:30

## 2025-08-09 RX ADMIN — ATORVASTATIN CALCIUM 10 MG: 20 TABLET, FILM COATED ORAL at 20:31

## 2025-08-09 RX ADMIN — ASPIRIN 81 MG: 81 TABLET, COATED ORAL at 09:30

## 2025-08-09 RX ADMIN — ENOXAPARIN SODIUM 40 MG: 100 INJECTION SUBCUTANEOUS at 20:32

## 2025-08-09 RX ADMIN — SERTRALINE HYDROCHLORIDE 25 MG: 25 TABLET ORAL at 09:30

## 2025-08-09 RX ADMIN — SENNOSIDES AND DOCUSATE SODIUM 2 TABLET: 50; 8.6 TABLET ORAL at 20:31

## 2025-08-09 RX ADMIN — LEVETIRACETAM 500 MG: 500 TABLET, FILM COATED ORAL at 20:31

## 2025-08-09 ASSESSMENT — PAIN SCALES - GENERAL: PAINLEVEL_OUTOF10: 0 - NO PAIN

## 2025-08-09 NOTE — PROGRESS NOTES
Ann Marie Murphy is a 89 y.o. female on day 7 of admission presenting with NSTEMI (non-ST elevated myocardial infarction) (Multi).      Subjective   Patient doing well this morning, still a little disoriented but overall stable.     Objective     Last Recorded Vitals  /62   Pulse 64   Temp 36.3 °C (97.3 °F)   Resp 16   Wt 54.4 kg (120 lb)   SpO2 95%   Intake/Output last 3 Shifts:    Intake/Output Summary (Last 24 hours) at 8/9/2025 1737  Last data filed at 8/9/2025 1400  Gross per 24 hour   Intake 120 ml   Output --   Net 120 ml       Admission Weight  Weight: 54.4 kg (120 lb) (08/02/25 1126)    Daily Weight  08/02/25 : 54.4 kg (120 lb)    Image Results  Transthoracic Echo Complete     [facility], [address] [place] [address]                 Tel [phone] and Fax [phone]    TRANSTHORACIC ECHOCARDIOGRAM REPORT       Patient Name:       ANN MARIE MURPHY  Reading Physician:    [id number] Jacquie Hdz MD  Study Date:         8/4/2025            Ordering Provider:    [id number] ZOHREH RODRIGUEZ  MRN/PID:            [id number]            Fellow:  Accession#:         [id number]        Nurse:                Nayely Cooley RN  Date of Birth/Age:  1935 / 89      Sonographer:          Nathaly duron                                     [facility]  Gender assigned at  F                   Additional Staff:  Birth:  Height:             152.40 cm           Admit Date:  Weight:             54.43 kg            Admission Status:     Inpatient -                                                                Routine  BSA / BMI:          1.50 m2 / 23.44     Encounter#:           [id number]                      kg/m2  Blood Pressure:     134/69 mmHg         Department Location:  [facility]                                                                 Non Invasive    Study Type:    TRANSTHORACIC ECHO (TTE) COMPLETE  Diagnosis/ICD: Syncope-R55  Indication:    Syncope  CPT Code:      Echo Complete w Full Doppler-63475    Patient History:  MI Location/Type:  Non-ST Elevation MI  Pertinent History: HTN, Hyperlipidemia and CVA. HOCM< LVH, Anemia, GERD.    Study Detail: The following Echo studies were performed: color flow, Doppler,                M-Mode and 2D. Technically challenging study due to body habitus                and Dementia. Optison used as a contrast agent for endocardial                border definition. Total contrast used for this procedure was 3.5                mL via IV push.       PHYSICIAN INTERPRETATION:  Left Ventricle: Left ventricular ejection fraction is normal by visual estimate at 70-75%. There is severe concentric left ventricular hypertrophy. There are no regional left ventricular wall motion abnormalities. The left ventricular cavity size is decreased. There is severely increased septal and moderately increased posterior left ventricular wall thickness. Spectral Doppler shows a Grade I (impaired relaxation pattern) of left ventricular diastolic filling with normal left atrial filling pressure.  Left Atrium: The left atrium is mildly dilated.  Right Ventricle: The right ventricle is normal in size. There is normal right ventricular global systolic function.  Right Atrium: The right atrium is normal in size.  Aortic Valve: The aortic valve is trileaflet. There is mild aortic valve thickening. There is no evidence of aortic valve regurgitation.  Mitral Valve: The mitral valve is mildly thickened. There is moderate mitral annular calcification. There is trace mitral valve regurgitation. The E Vmax is 0.73 m/s.  Tricuspid Valve: The tricuspid valve is structurally normal. There is trace tricuspid regurgitation.  Pulmonic Valve: The pulmonic valve is structurally normal. There is no indication of pulmonic valve  regurgitation.  Pericardium: Small pericardial effusion.  Aorta: The aortic root is normal. The Asc Ao is 3.20 cm. There is no dilatation of the ascending aorta.  Systemic Veins: The inferior vena cava appears small in size, with IVC inspiratory collapse greater than 50%.  In comparison to the previous echocardiogram(s): Compared with study dated 2/12/2020, no significant change.       CONCLUSIONS:   1. Left ventricular ejection fraction is normal by visual estimate at 70-75%.   2. Spectral Doppler shows a Grade I (impaired relaxation pattern) of left ventricular diastolic filling with normal left atrial filling pressure.   3. Left ventricular cavity size is decreased.   4. There is severely increased septal and moderately increased posterior left ventricular wall thickness.   5. There is severe concentric left ventricular hypertrophy.   6. There is normal right ventricular global systolic function.   7. The left atrium is mildly dilated.   8. Small pericardial effusion.   9. There is moderate mitral annular calcification.    RECOMMENDATIONS:  Utilizing an FDA cleared automated machine learning algorithm (EchoGo Heart Failure by Droid system master), the analysis of the apical 4-chamber echocardiogram suggests the presence of heart failure with preserved ejection fraction (HFpEF)*. Clinical correlation looking for additional heart failure signs and symptoms is recommended, as a definite diagnosis of heart failure cannot be made by imaging alone.  *Per ACC/AHA/HFSA universal diagnosis of heart failure, HFpEF is defined as 1) signs and symptoms leading to clinical diagnosis of heart failure, 2) an ejection fraction of at least 50%, and 3) evidence of elevated intra-cardiac filling pressures by echocardiography, BNP elevation, or catheterization.     QUANTITATIVE DATA SUMMARY:     2D MEASUREMENTS:          Normal Ranges:  Ao Root d:       3.40 cm  (2.0-3.7cm)  LAs:             3.45 cm  (2.7-4.0cm)  IVSd:            1.60 cm   (0.6-1.1cm)  LVPWd:           1.50 cm  (0.6-1.1cm)  LVIDd:           3.40 cm  (3.9-5.9cm)  LVIDs:           1.70 cm  LV Mass Index:   131 g/m2  LV % FS          50.0 %       LEFT ATRIUM:                  Normal Ranges:  LA Vol A4C:        35.0 ml    (22+/-6mL/m2)  LA Vol A2C:        53.8 ml  LA Vol BP:         45.9 ml  LA Vol Index A4C:  23.3ml/m2  LA Vol Index A2C:  35.8 ml/m2  LA Vol Index BP:   30.6 ml/m2  LA Area A4C:       14.5 cm2  LA Area A2C:       19.0 cm2  LA Major Axis A4C: 5.1 cm  LA Major Axis A2C: 5.7 cm  LA Volume Index:   35.8 ml/m2       AORTA MEASUREMENTS:         Normal Ranges:  Asc Ao, d:          3.20 cm (2.1-3.4cm)       LV SYSTOLIC FUNCTION:                       Normal Ranges:  EF-Visual:      73 %  LV EF Reported: 73 %       LV DIASTOLIC FUNCTION:             Normal Ranges:  MV Peak E:             0.73 m/s    (0.7-1.2 m/s)  MV Peak A:             1.11 m/s    (0.42-0.7 m/s)  E/A Ratio:             0.66        (1.0-2.2)  MV e'                  0.082 m/s   (>8.0)  MV lateral e'          0.12 m/s  MV medial e'           0.05 m/s  MV A Dur:              144.00 msec  E/e' Ratio:            8.82        (<8.0)  MV DT:                 250 msec    (150-240 msec)       MITRAL VALVE:          Normal Ranges:  MV DT:        250 msec (150-240msec)       AORTIC VALVE:           Normal Ranges:  AoV Vmax:      1.45 m/s (<=1.7m/s)  AoV Peak P.4 mmHg (<20mmHg)  LVOT Max Eugene:  1.33 m/s (<=1.1m/s)  LVOT VTI:      29.30 cm  LVOT Diameter: 2.00 cm  (1.8-2.4cm)  AoV Area,Vmax: 2.88 cm2 (2.5-4.5cm2)       RIGHT VENTRICLE:  RV Basal 3.10 cm  RV Mid   1.70 cm  RV Major 8.3 cm  RV s'    0.11 m/s       [id number] Jacquie dHz MD  Electronically signed on 2025 at 7:52:05 AM       ** Final **      Physical Exam  Constitutional:       General: She is not in acute distress.     Appearance: She is not toxic-appearing.   HENT:      Head: Normocephalic and atraumatic.      Mouth/Throat:      Mouth: Mucous membranes are  moist.      Pharynx: No oropharyngeal exudate.     Eyes:      General: No scleral icterus.     Pupils: Pupils are equal, round, and reactive to light.       Cardiovascular:      Rate and Rhythm: Normal rate and regular rhythm.      Pulses: Normal pulses.      Heart sounds: No murmur heard.  Pulmonary:      Effort: Pulmonary effort is normal. No respiratory distress.      Breath sounds: No wheezing.   Abdominal:      General: Abdomen is flat. There is no distension.      Tenderness: There is no abdominal tenderness.     Musculoskeletal:      Right lower leg: No edema.      Left lower leg: No edema.     Skin:     General: Skin is warm.      Coloration: Skin is not jaundiced.     Neurological:      General: No focal deficit present.      Mental Status: She is oriented to person, place, and time. Mental status is at baseline.     Psychiatric:         Mood and Affect: Mood normal.         Behavior: Behavior normal.         Thought Content: Thought content normal.         Relevant Results    Scheduled medications  Scheduled Medications[1]  Continuous medications  Continuous Medications[2]  PRN medications  PRN Medications[3]     Results for orders placed or performed during the hospital encounter of 08/02/25 (from the past 24 hours)   POCT GLUCOSE   Result Value Ref Range    POCT Glucose 133 (H) 74 - 99 mg/dL   CBC and Auto Differential   Result Value Ref Range    WBC 9.7 4.4 - 11.3 x10*3/uL    nRBC 0.0 0.0 - 0.0 /100 WBCs    RBC 3.78 (L) 4.00 - 5.20 x10*6/uL    Hemoglobin 12.2 12.0 - 16.0 g/dL    Hematocrit 38.4 36.0 - 46.0 %     (H) 80 - 100 fL    MCH 32.3 26.0 - 34.0 pg    MCHC 31.8 (L) 32.0 - 36.0 g/dL    RDW 13.3 11.5 - 14.5 %    Platelets 182 150 - 450 x10*3/uL    Neutrophils % 70.7 40.0 - 80.0 %    Immature Granulocytes %, Automated 0.7 0.0 - 0.9 %    Lymphocytes % 20.2 13.0 - 44.0 %    Monocytes % 7.4 2.0 - 10.0 %    Eosinophils % 0.6 0.0 - 6.0 %    Basophils % 0.4 0.0 - 2.0 %    Neutrophils Absolute 6.82  (H) 1.60 - 5.50 x10*3/uL    Immature Granulocytes Absolute, Automated 0.07 0.00 - 0.50 x10*3/uL    Lymphocytes Absolute 1.95 0.80 - 3.00 x10*3/uL    Monocytes Absolute 0.71 0.05 - 0.80 x10*3/uL    Eosinophils Absolute 0.06 0.00 - 0.40 x10*3/uL    Basophils Absolute 0.04 0.00 - 0.10 x10*3/uL   Renal Function Panel   Result Value Ref Range    Glucose 114 (H) 74 - 99 mg/dL    Sodium 141 136 - 145 mmol/L    Potassium 3.5 3.5 - 5.3 mmol/L    Chloride 110 (H) 98 - 107 mmol/L    Bicarbonate 23 21 - 32 mmol/L    Anion Gap 12 10 - 20 mmol/L    Urea Nitrogen 29 (H) 6 - 23 mg/dL    Creatinine 0.63 0.50 - 1.05 mg/dL    eGFR 85 >60 mL/min/1.73m*2    Calcium 10.0 8.6 - 10.6 mg/dL    Phosphorus 2.4 (L) 2.5 - 4.9 mg/dL    Albumin 3.5 3.4 - 5.0 g/dL   Magnesium   Result Value Ref Range    Magnesium 2.04 1.60 - 2.40 mg/dL        Assessment & Plan  NSTEMI (non-ST elevated myocardial infarction) (Multi)    Pt is a 89 y.o. female with hx of right-sided invasive ductal carcinoma, clinical stage IB (T2 N0 M0),  dementia,  hypertension, HLD, seizure disorder who presents after an unwitnessed fall last night. Patient was admitted for further workup after syncope, fall and tropenmia.     #AMS likely 2/2 delirium  - Unclear etiology - improved today   - Infectious workup negative   - Likely delirium in the setting of possible dementia - if mental status continues to worsen will consider neurology consult   - Will need outpatient follow-up  with neuro    #Syncope  #Fall  - Ddx: Orthostasis 2/2 hypovolemia in setting of UTI vs Vasovagal vs Cardiac/Structural - imaging unremarkable including CTH, cervical spine, maxillofacial bones w/o con, and XR pelvis   - ECHO concerning severely increased septal and moderately increased posterior left ventricular wall thickness.   Plan  - PMR consult - recommend SNF; SW has started the process  - Tylenol prn for pain     #Uncomplicated UTI  -Ucx-Clinically insignificant growth based on current clinical  standards.   -Completed 5 day course of CTX       #NSTEMI likely Type 2 - resolved   -EKG NSR w/o ST elevation   - A1C 5.6%,   - trops 150 -> 159 -> 154, will stop trending  PLAN:  - Stat EKG and ACS workup/management if endorsing chest pain  - Continue lipitor 10 mg and ASA 81 mg daily   - Continue metop succinate 100 mg daily      #Chronic conditions  - HTN:  valsartan 320 mg daily   - Dementia: sertraline 25 mg and seroquel 12.5 mg daily   - Seizure disorder: keppra 500 mg BID   - H/o breast carcinoma in remission: c/h anastrozole      F : PRN  E: PRN  N: Adult diet Regular  A:  PIV  DVT prophylaxis: Lovenox subq     Code Status: Full Code (confirmed on admission)   NOK: Extended Emergency Contact Information  Primary Emergency Contact: Mary Sebastian  [place] Phone: [phone]  Mobile Phone: [phone]  Relation: Daughter  Secondary Emergency Contact: Micah Reddy   [place]  Mobile Phone: [phone]  Relation: Daughter     DISPO: [facility] medical optimization   FOLLOW UPS: PCP    Christine Byrd MD                   [1] anastrozole, 1 mg, oral, Daily  aspirin, 81 mg, oral, Daily  atorvastatin, 10 mg, oral, Nightly  enoxaparin, 40 mg, subcutaneous, q24h  levETIRAcetam, 500 mg, oral, BID  metoprolol succinate XL, 100 mg, oral, Daily  QUEtiapine, 12.5 mg, oral, BID  sennosides-docusate sodium, 2 tablet, oral, BID  sertraline, 25 mg, oral, Daily  valsartan, 320 mg, oral, Daily     [2]    [3] PRN medications: acetaminophen

## 2025-08-09 NOTE — PROGRESS NOTES
08/09/25 1125   Rapid Rounds   Attendance Provider;Care Transitions   Expected Discharge Disposition SNF   Today we still await: Placement process   Review at Escalation Rounds No escalation needed     Per attending pt is medically ready for discharge pending SNF placement. [facility] confirmed via [facility] they can accept pt and should have a been available early next week. 2nd F[facility] unable to accept due to lack of bed availability. Daughter was updated of above and provided another SNF option [facility], referral sent via [facility] for review. Daughter agreed to review the SNF list for additional preferences in the event [facility] can not accommodate. Attending updated of above. Care Transitions team will continue to follow for discharge planning needs.    Addendum 1439: [facility] is still reviewing for acceptance. Daughter Mary called me back with additional SNF preferences [facility] and [facility], referrals were sent via [facility]. [facility] stated they will do their best to get a firm answer regarding admission on Monday, daughter aware.    Leandro Mullen RN  Transitional Care Coordinator (TCC)  [phone] or [phone]     None

## 2025-08-10 VITALS
TEMPERATURE: 97.5 F | RESPIRATION RATE: 19 BRPM | BODY MASS INDEX: 23.56 KG/M2 | DIASTOLIC BLOOD PRESSURE: 69 MMHG | SYSTOLIC BLOOD PRESSURE: 134 MMHG | WEIGHT: 120 LBS | HEART RATE: 72 BPM | OXYGEN SATURATION: 94 % | HEIGHT: 60 IN

## 2025-08-10 LAB — BACTERIA BLD CULT: NORMAL

## 2025-08-10 PROCEDURE — 2500000001 HC RX 250 WO HCPCS SELF ADMINISTERED DRUGS (ALT 637 FOR MEDICARE OP)

## 2025-08-10 PROCEDURE — 99232 SBSQ HOSP IP/OBS MODERATE 35: CPT | Performed by: STUDENT IN AN ORGANIZED HEALTH CARE EDUCATION/TRAINING PROGRAM

## 2025-08-10 PROCEDURE — 2500000002 HC RX 250 W HCPCS SELF ADMINISTERED DRUGS (ALT 637 FOR MEDICARE OP, ALT 636 FOR OP/ED)

## 2025-08-10 PROCEDURE — 2500000004 HC RX 250 GENERAL PHARMACY W/ HCPCS (ALT 636 FOR OP/ED)

## 2025-08-10 PROCEDURE — 1210000001 HC SEMI-PRIVATE ROOM DAILY

## 2025-08-10 RX ADMIN — SENNOSIDES AND DOCUSATE SODIUM 2 TABLET: 50; 8.6 TABLET ORAL at 20:28

## 2025-08-10 RX ADMIN — ANASTROZOLE 1 MG: 1 TABLET ORAL at 08:44

## 2025-08-10 RX ADMIN — QUETIAPINE FUMARATE 12.5 MG: 25 TABLET ORAL at 08:36

## 2025-08-10 RX ADMIN — LEVETIRACETAM 500 MG: 500 TABLET, FILM COATED ORAL at 20:26

## 2025-08-10 RX ADMIN — SERTRALINE HYDROCHLORIDE 25 MG: 25 TABLET ORAL at 08:36

## 2025-08-10 RX ADMIN — QUETIAPINE FUMARATE 12.5 MG: 25 TABLET ORAL at 20:28

## 2025-08-10 RX ADMIN — VALSARTAN 320 MG: 160 TABLET, FILM COATED ORAL at 08:35

## 2025-08-10 RX ADMIN — LEVETIRACETAM 500 MG: 500 TABLET, FILM COATED ORAL at 08:36

## 2025-08-10 RX ADMIN — ATORVASTATIN CALCIUM 10 MG: 20 TABLET, FILM COATED ORAL at 20:28

## 2025-08-10 RX ADMIN — ASPIRIN 81 MG: 81 TABLET, COATED ORAL at 08:36

## 2025-08-10 RX ADMIN — METOPROLOL SUCCINATE 100 MG: 100 TABLET, EXTENDED RELEASE ORAL at 08:36

## 2025-08-10 RX ADMIN — ENOXAPARIN SODIUM 40 MG: 100 INJECTION SUBCUTANEOUS at 20:28

## 2025-08-10 ASSESSMENT — COGNITIVE AND FUNCTIONAL STATUS - GENERAL
TOILETING: A LOT
TURNING FROM BACK TO SIDE WHILE IN FLAT BAD: A LITTLE
DAILY ACTIVITIY SCORE: 16
MOVING TO AND FROM BED TO CHAIR: A LOT
DRESSING REGULAR UPPER BODY CLOTHING: A LOT
TURNING FROM BACK TO SIDE WHILE IN FLAT BAD: A LITTLE
CLIMB 3 TO 5 STEPS WITH RAILING: A LOT
CLIMB 3 TO 5 STEPS WITH RAILING: A LOT
DAILY ACTIVITIY SCORE: 14
WALKING IN HOSPITAL ROOM: A LOT
MOVING FROM LYING ON BACK TO SITTING ON SIDE OF FLAT BED WITH BEDRAILS: A LITTLE
EATING MEALS: A LITTLE
MOVING FROM LYING ON BACK TO SITTING ON SIDE OF FLAT BED WITH BEDRAILS: A LITTLE
STANDING UP FROM CHAIR USING ARMS: A LOT
TOILETING: A LOT
WALKING IN HOSPITAL ROOM: A LOT
DRESSING REGULAR LOWER BODY CLOTHING: A LOT
DRESSING REGULAR UPPER BODY CLOTHING: A LOT
MOBILITY SCORE: 14
HELP NEEDED FOR BATHING: A LOT
DRESSING REGULAR LOWER BODY CLOTHING: A LOT
STANDING UP FROM CHAIR USING ARMS: A LOT
MOBILITY SCORE: 14
PERSONAL GROOMING: A LITTLE
HELP NEEDED FOR BATHING: A LOT
MOVING TO AND FROM BED TO CHAIR: A LOT

## 2025-08-10 ASSESSMENT — PAIN SCALES - GENERAL
PAINLEVEL_OUTOF10: 0 - NO PAIN
PAINLEVEL_OUTOF10: 0 - NO PAIN

## 2025-08-10 ASSESSMENT — PAIN - FUNCTIONAL ASSESSMENT
PAIN_FUNCTIONAL_ASSESSMENT: 0-10
PAIN_FUNCTIONAL_ASSESSMENT: 0-10

## 2025-08-10 NOTE — PROGRESS NOTES
08/10/25 0959   Discharge Planning   Who is requesting discharge planning? Provider   Home or Post Acute Services Post acute facilities (Rehab/SNF/etc)   Type of Post Acute Facility Services Skilled nursing   Expected Discharge Disposition SNF  ([facility])     Daughter Mary[facility] confirmed they will have a bed available tomorrow afternoon. Daughter's other SNF preferences [facility] unable to accept, [facility] is still reviewing, and [facility] stated they will check insurance verification/bed availability on Monday. Daughter Mary and attending were both updated of above. Care Transitions team will continue to follow for discharge planning needs.    Leandro Mullen RN  Transitional Care Coordinator (TCC)  [phone] or [phone]

## 2025-08-10 NOTE — PROGRESS NOTES
Ann Marie Murphy is a 89 y.o. female on day 8 of admission presenting with NSTEMI (non-ST elevated myocardial infarction) (Multi).      Subjective     NAEO, sitter has been discontinued and patient is stable. Waiting for SNF placement.     Objective     Last Recorded Vitals  /86   Pulse 63   Temp 36.3 °C (97.3 °F)   Resp 18   Wt 54.4 kg (120 lb)   SpO2 92%   Intake/Output last 3 Shifts:    Intake/Output Summary (Last 24 hours) at 8/10/2025 0811  Last data filed at 8/9/2025 2200  Gross per 24 hour   Intake 120 ml   Output 100 ml   Net 20 ml       Admission Weight  Weight: 54.4 kg (120 lb) (08/02/25 1126)    Daily Weight  08/02/25 : 54.4 kg (120 lb)    Image Results  Transthoracic Echo Complete     [facility], [address] [place] [address]                 Tel [phone] and Fax [phone]    TRANSTHORACIC ECHOCARDIOGRAM REPORT       Patient Name:       ANN MARIE MURPHY  Reading Physician:    [id number] Jacquie Hdz MD  Study Date:         8/4/2025            Ordering Provider:    [id number] ZOHREH RODRIGUEZ  MRN/PID:            [id number]            Fellow:  Accession#:         [id number]        Nurse:                Nayely Cooley RN  Date of Birth/Age:  1935 / 89      Sonographer:          Nathaly duron                                     [facility]  Gender assigned at  F                   Additional Staff:  Birth:  Height:             152.40 cm           Admit Date:  Weight:             54.43 kg            Admission Status:     Inpatient -                                                                Routine  BSA / BMI:          1.50 m2 / 23.44     Encounter#:           [id number]                      kg/m2  Blood Pressure:     134/69 mmHg         Department Location:  [facility]                                                                 Non Invasive    Study Type:    TRANSTHORACIC ECHO (TTE) COMPLETE  Diagnosis/ICD: Syncope-R55  Indication:    Syncope  CPT Code:      Echo Complete w Full [id number]    Patient History:  MI Location/Type:  Non-ST Elevation MI  Pertinent History: HTN, Hyperlipidemia and CVA. HOCM< LVH, Anemia, GERD.    Study Detail: The following Echo studies were performed: color flow, Doppler,                M-Mode and 2D. Technically challenging study due to body habitus                and Dementia. Optison used as a contrast agent for endocardial                border definition. Total contrast used for this procedure was 3.5                mL via IV push.       PHYSICIAN INTERPRETATION:  Left Ventricle: Left ventricular ejection fraction is normal by visual estimate at 70-75%. There is severe concentric left ventricular hypertrophy. There are no regional left ventricular wall motion abnormalities. The left ventricular cavity size is decreased. There is severely increased septal and moderately increased posterior left ventricular wall thickness. Spectral Doppler shows a Grade I (impaired relaxation pattern) of left ventricular diastolic filling with normal left atrial filling pressure.  Left Atrium: The left atrium is mildly dilated.  Right Ventricle: The right ventricle is normal in size. There is normal right ventricular global systolic function.  Right Atrium: The right atrium is normal in size.  Aortic Valve: The aortic valve is trileaflet. There is mild aortic valve thickening. There is no evidence of aortic valve regurgitation.  Mitral Valve: The mitral valve is mildly thickened. There is moderate mitral annular calcification. There is trace mitral valve regurgitation. The E Vmax is 0.73 m/s.  Tricuspid Valve: The tricuspid valve is structurally normal. There is trace tricuspid regurgitation.  Pulmonic Valve: The pulmonic valve is structurally normal. There is no indication of pulmonic valve  regurgitation.  Pericardium: Small pericardial effusion.  Aorta: The aortic root is normal. The Asc Ao is 3.20 cm. There is no dilatation of the ascending aorta.  Systemic Veins: The inferior vena cava appears small in size, with IVC inspiratory collapse greater than 50%.  In comparison to the previous echocardiogram(s): Compared with study dated 2/12/2020, no significant change.       CONCLUSIONS:   1. Left ventricular ejection fraction is normal by visual estimate at 70-75%.   2. Spectral Doppler shows a Grade I (impaired relaxation pattern) of left ventricular diastolic filling with normal left atrial filling pressure.   3. Left ventricular cavity size is decreased.   4. There is severely increased septal and moderately increased posterior left ventricular wall thickness.   5. There is severe concentric left ventricular hypertrophy.   6. There is normal right ventricular global systolic function.   7. The left atrium is mildly dilated.   8. Small pericardial effusion.   9. There is moderate mitral annular calcification.    RECOMMENDATIONS:  Utilizing an FDA cleared automated machine learning algorithm (EchoGo Heart Failure by [employer]), the analysis of the apical 4-chamber echocardiogram suggests the presence of heart failure with preserved ejection fraction (HFpEF)*. Clinical correlation looking for additional heart failure signs and symptoms is recommended, as a definite diagnosis of heart failure cannot be made by imaging alone.  *Per ACC/AHA/HFSA universal diagnosis of heart failure, HFpEF is defined as 1) signs and symptoms leading to clinical diagnosis of heart failure, 2) an ejection fraction of at least 50%, and 3) evidence of elevated intra-cardiac filling pressures by echocardiography, BNP elevation, or catheterization.     QUANTITATIVE DATA SUMMARY:     2D MEASUREMENTS:          Normal Ranges:  Ao Root d:       3.40 cm  (2.0-3.7cm)  LAs:             3.45 cm  (2.7-4.0cm)  IVSd:            1.60 cm   (0.6-1.1cm)  LVPWd:           1.50 cm  (0.6-1.1cm)  LVIDd:           3.40 cm  (3.9-5.9cm)  LVIDs:           1.70 cm  LV Mass Index:   131 g/m2  LV % FS          50.0 %       LEFT ATRIUM:                  Normal Ranges:  LA Vol A4C:        35.0 ml    (22+/-6mL/m2)  LA Vol A2C:        53.8 ml  LA Vol BP:         45.9 ml  LA Vol Index A4C:  23.3ml/m2  LA Vol Index A2C:  35.8 ml/m2  LA Vol Index BP:   30.6 ml/m2  LA Area A4C:       14.5 cm2  LA Area A2C:       19.0 cm2  LA Major Axis A4C: 5.1 cm  LA Major Axis A2C: 5.7 cm  LA Volume Index:   35.8 ml/m2       AORTA MEASUREMENTS:         Normal Ranges:  Asc Ao, d:          3.20 cm (2.1-3.4cm)       LV SYSTOLIC FUNCTION:                       Normal Ranges:  EF-Visual:      73 %  LV EF Reported: 73 %       LV DIASTOLIC FUNCTION:             Normal Ranges:  MV Peak E:             0.73 m/s    (0.7-1.2 m/s)  MV Peak A:             1.11 m/s    (0.42-0.7 m/s)  E/A Ratio:             0.66        (1.0-2.2)  MV e'                  0.082 m/s   (>8.0)  MV lateral e'          0.12 m/s  MV medial e'           0.05 m/s  MV A Dur:              144.00 msec  E/e' Ratio:            8.82        (<8.0)  MV DT:                 250 msec    (150-240 msec)       MITRAL VALVE:          Normal Ranges:  MV DT:        250 msec (150-240msec)       AORTIC VALVE:           Normal Ranges:  AoV Vmax:      1.45 m/s (<=1.7m/s)  AoV Peak P.4 mmHg (<20mmHg)  LVOT Max Eugene:  1.33 m/s (<=1.1m/s)  LVOT VTI:      29.30 cm  LVOT Diameter: 2.00 cm  (1.8-2.4cm)  AoV Area,Vmax: 2.88 cm2 (2.5-4.5cm2)       RIGHT VENTRICLE:  RV Basal 3.10 cm  RV Mid   1.70 cm  RV Major 8.3 cm  RV s'    0.11 m/s       [id number] Jacquie Hdz MD  Electronically signed on 2025 at 7:52:05 AM       ** Final **      Physical Exam  Constitutional:       General: She is not in acute distress.     Appearance: She is not toxic-appearing.   HENT:      Head: Normocephalic and atraumatic.      Mouth/Throat:      Mouth: Mucous membranes are  moist.      Pharynx: No oropharyngeal exudate.     Eyes:      General: No scleral icterus.     Pupils: Pupils are equal, round, and reactive to light.       Cardiovascular:      Rate and Rhythm: Normal rate and regular rhythm.      Pulses: Normal pulses.      Heart sounds: No murmur heard.  Pulmonary:      Effort: Pulmonary effort is normal. No respiratory distress.      Breath sounds: No wheezing.   Abdominal:      General: Abdomen is flat. There is no distension.      Tenderness: There is no abdominal tenderness.     Musculoskeletal:      Right lower leg: No edema.      Left lower leg: No edema.     Skin:     General: Skin is warm.      Coloration: Skin is not jaundiced.     Neurological:      General: No focal deficit present.      Mental Status: She is oriented to person, place, and time. Mental status is at baseline.     Psychiatric:         Mood and Affect: Mood normal.         Behavior: Behavior normal.         Thought Content: Thought content normal.         Relevant Results    Scheduled medications  Scheduled Medications[1]  Continuous medications  Continuous Medications[2]  PRN medications  PRN Medications[3]     No results found for this or any previous visit (from the past 24 hours).       Assessment & Plan  NSTEMI (non-ST elevated myocardial infarction) (Multi)    Pt is a 89 y.o. female with hx of right-sided invasive ductal carcinoma, clinical stage IB (T2 N0 M0),  dementia,  hypertension, HLD, seizure disorder who presents after an unwitnessed fall last night. Patient was admitted for further workup after syncope, fall and tropenmia.     #AMS likely 2/2 delirium - improved   - Infectious workup negative   - Likely delirium in the setting of possible dementia - if mental status continues to worsen will consider neurology consult   - Will need outpatient follow-up with neuro - concern for dementia per PCP    #Syncope  #Fall  - Ddx: Orthostasis 2/2 hypovolemia in setting of UTI vs Vasovagal vs  Cardiac/Structural - imaging unremarkable including CTH, cervical spine, maxillofacial bones w/o con, and XR pelvis   - ECHO concerning severely increased septal and moderately increased posterior left ventricular wall thickness.   Plan  - PMR consult - recommend SNF; SW has started the process  - Tylenol prn for pain     #Uncomplicated UTI  -Ucx-Clinically insignificant growth based on current clinical standards.   -Completed 5 day course of CTX       #NSTEMI likely Type 2 - resolved   -EKG NSR w/o ST elevation   - A1C 5.6%,   - trops 150 -> 159 -> 154, will stop trending  PLAN:  - Stat EKG and ACS workup/management if endorsing chest pain  - Continue lipitor 10 mg and ASA 81 mg daily   - Continue metop succinate 100 mg daily      #Chronic conditions  - HTN:  valsartan 320 mg daily   - Dementia: sertraline 25 mg and seroquel 12.5 mg daily   - Seizure disorder: keppra 500 mg BID   - H/o breast carcinoma in remission: c/h anastrozole      F : PRN  E: PRN  N: Adult diet Regular  A:  PIV  DVT prophylaxis: Lovenox subq     Code Status: Full Code (confirmed on admission)   NOK: Extended Emergency Contact Information  Primary Emergency Contact: Mary Sebastian  [place] Phone: [phone]  Mobile Phone: [phone]  Relation: Daughter  Secondary Emergency Contact: Micah Reddy   [place]  Mobile Phone: [phone]  Relation: Daughter     DISPO: SNF  FOLLOW UPS: PCP    Christine Byrd MD                     [1] anastrozole, 1 mg, oral, Daily  aspirin, 81 mg, oral, Daily  atorvastatin, 10 mg, oral, Nightly  enoxaparin, 40 mg, subcutaneous, q24h  levETIRAcetam, 500 mg, oral, BID  metoprolol succinate XL, 100 mg, oral, Daily  QUEtiapine, 12.5 mg, oral, BID  sennosides-docusate sodium, 2 tablet, oral, BID  sertraline, 25 mg, oral, Daily  valsartan, 320 mg, oral, Daily     [2]    [3] PRN medications: acetaminophen

## 2025-08-10 NOTE — CARE PLAN
The patient's goals for the shift include      The clinical goals for the shift include patient will be free from fall/injury during the shift.    Over the shift, the patient did not make progress toward the following goals. Barriers to progression include . Recommendations to address these barriers include   Problem: Skin  Goal: Promote/optimize nutrition  Outcome: Progressing     Problem: Skin  Goal: Prevent/minimize sheer/friction injuries  8/10/2025 0140 by Dereck Scales RN  Outcome: Progressing  Flowsheets (Taken 8/10/2025 0140)  Prevent/minimize sheer/friction injuries:   Turn/reposition every 2 hours/use positioning/transfer devices   HOB 30 degrees or less   Use pull sheet     Problem: Skin  Goal: Prevent/minimize sheer/friction injuries  8/10/2025 0140 by Dereck Scales RN  Flowsheets (Taken 8/10/2025 0140)  Prevent/minimize sheer/friction injuries:   Turn/reposition every 2 hours/use positioning/transfer devices   HOB 30 degrees or less   Use pull sheet  8/10/2025 0140 by Dereck Scales RN  Outcome: Progressing  Flowsheets (Taken 8/10/2025 0140)  Prevent/minimize sheer/friction injuries:   Turn/reposition every 2 hours/use positioning/transfer devices   HOB 30 degrees or less   Use pull sheet     Problem: Skin  Goal: Prevent/minimize sheer/friction injuries  8/10/2025 0140 by Dereck Scales RN  Flowsheets (Taken 8/10/2025 0140)  Prevent/minimize sheer/friction injuries:   Turn/reposition every 2 hours/use positioning/transfer devices   HOB 30 degrees or less   Use pull sheet   .

## 2025-08-10 NOTE — CARE PLAN
The patient's goals for the shift include      The clinical goals for the shift include patient will be free from fall/injury during the shift.    Over the shift, the patient did not make progress toward the following goals. Barriers to progression include   Problem: Pain - Adult  Goal: Verbalizes/displays adequate comfort level or baseline comfort level  Outcome: Progressing     Problem: Safety - Adult  Goal: Free from fall injury  Outcome: Progressing     Problem: Discharge Planning  Goal: Discharge to home or other facility with appropriate resources  Outcome: Progressing     Problem: Chronic Conditions and Co-morbidities  Goal: Patient's chronic conditions and co-morbidity symptoms are monitored and maintained or improved  Outcome: Progressing   . Recommendations to address these barriers include .

## 2025-08-11 VITALS
HEIGHT: 60 IN | DIASTOLIC BLOOD PRESSURE: 80 MMHG | WEIGHT: 120 LBS | HEART RATE: 65 BPM | RESPIRATION RATE: 16 BRPM | TEMPERATURE: 97.2 F | OXYGEN SATURATION: 95 % | BODY MASS INDEX: 23.56 KG/M2 | SYSTOLIC BLOOD PRESSURE: 161 MMHG

## 2025-08-11 PROCEDURE — 2500000002 HC RX 250 W HCPCS SELF ADMINISTERED DRUGS (ALT 637 FOR MEDICARE OP, ALT 636 FOR OP/ED)

## 2025-08-11 PROCEDURE — 2500000004 HC RX 250 GENERAL PHARMACY W/ HCPCS (ALT 636 FOR OP/ED)

## 2025-08-11 PROCEDURE — 2500000001 HC RX 250 WO HCPCS SELF ADMINISTERED DRUGS (ALT 637 FOR MEDICARE OP)

## 2025-08-11 RX ORDER — LEVETIRACETAM 500 MG/1
500 TABLET ORAL 2 TIMES DAILY
Start: 2025-08-11

## 2025-08-11 RX ADMIN — VALSARTAN 320 MG: 160 TABLET, FILM COATED ORAL at 10:26

## 2025-08-11 RX ADMIN — METOPROLOL SUCCINATE 100 MG: 100 TABLET, EXTENDED RELEASE ORAL at 10:26

## 2025-08-11 RX ADMIN — LEVETIRACETAM 500 MG: 500 TABLET, FILM COATED ORAL at 10:26

## 2025-08-11 RX ADMIN — ASPIRIN 81 MG: 81 TABLET, COATED ORAL at 10:26

## 2025-08-11 RX ADMIN — ANASTROZOLE 1 MG: 1 TABLET ORAL at 10:51

## 2025-08-11 RX ADMIN — SERTRALINE HYDROCHLORIDE 25 MG: 25 TABLET ORAL at 10:26

## 2025-08-11 RX ADMIN — QUETIAPINE FUMARATE 12.5 MG: 25 TABLET ORAL at 10:26

## 2025-08-11 NOTE — DISCHARGE SUMMARY
Discharge Summary       NAME: Ann Marie Murphy    : 1935    MRN:  [id number]    ADMIT DATE: 2025    DISCHARGE DATE:   2025  PRIMARY TEAM ON DISCHARGE: Hospital Medicine    PRIMARY CARE PHYSICIAN:  Heriberto Lopes MD    Discharge Diagnosis    #AMS likely 2/2 delirium  - Infectious workup negative   - Likely delirium in the setting of possible dementia - if mental status continues to worsen will consider neurology consult   - Will need outpatient follow-up with neuro - concern for dementia per PCP, has appointment scheduled in February of next year     #Syncope c/b fall from standing  - Ddx: Orthostasis 2/2 hypovolemia in setting of UTI vs Vasovagal - imaging unremarkable including CTH, cervical spine, maxillofacial bones w/o con, and XR pelvis   - ECHO concerning severely increased septal and moderately increased posterior left ventricular wall thickness but no valvular or conduction abnormalities.   - SNF placement for PT    #Uncomplicated UTI - resolved   - Ucx-Clinically insignificant growth based on current clinical standards.   - Completed 5 day course of CTX       #NSTEMI likely Type 2 - resolved   - EKG NSR w/o ST elevation   - Continue lipitor 10 mg and ASA 81 mg daily   - Continue metop succinate 100 mg daily      #Chronic conditions  - HTN:  continue valsartan 320 mg daily   - Dementia: continue sertraline 25 mg and seroquel 12.5 mg daily   - Seizure disorder: continue keppra 500 mg BID   - H/o breast carcinoma in remission: continue anastrozole     Issues Requiring Follow-Up  - Dementia work-up with neurology     Test Results Pending At Discharge  Pending Labs       No current pending labs.            Consultants    Hospital Course  Ms. Murphy is a 89 y.o. female with hx of right-sided invasive ductal carcinoma, clinical stage IB (T2 N0 M0), dementia, hypertension, HLD, seizure disorder who presents after an unwitnessed fall last night. Patient was admitted for further workup after  syncope, fall and tropenmia. Syncopal vs. Fall from standing determined to be 2/2 to orthostatic hypotension iso of hypovolemia but also overall gait instability. ECHO completed did not show acute cause for reported syncopal event.     Hospital course complicated by UTI treated with 5 day course of rocephin and acute myocardial injury vs. Type 2 NSTEMI from infection and syncopal event. Patient hemodynamically stable on discharge.        New Medications: Restart taking aspirin and lipitor 10 mg daily   Changed Medications: none  Held Medications: none     Medication List      START taking these medications     atorvastatin 10 mg tablet; Commonly known as: Lipitor; Take 1 tablet (10   mg) by mouth once daily at bedtime.     CHANGE how you take these medications     levETIRAcetam 500 mg tablet; Commonly known as: Keppra; Take 1 tablet   (500 mg) by mouth 2 times a day.; What changed: how much to take, how to   take this, when to take this, additional instructions     CONTINUE taking these medications     acetaminophen 325 mg tablet; Commonly known as: Tylenol   anastrozole 1 mg tablet; Commonly known as: Arimidex; Take 1 tablet (1   mg total) by mouth once daily.   ascorbic acid (vitamin C) 500 mg capsule   aspirin 81 mg EC tablet   cholecalciferol 50 mcg (2,000 units) tablet; Commonly known as: Vitamin   D-3   metoprolol succinate  mg 24 hr tablet; Commonly known as:   Toprol-XL; Take 1 tablet (100 mg) by mouth once daily.   QUEtiapine 25 mg tablet; Commonly known as: SEROquel; Take 0.5 tablets   (12.5 mg) by mouth 2 times a day.   sertraline 25 mg tablet; Commonly known as: Zoloft; Take 1 tablet (25   mg) by mouth once daily.   valsartan 320 mg tablet; Commonly known as: Diovan; Take 1 tablet (320   mg) by mouth once daily.       Diet: regular    Activity: resume regular activity    Disposition:  SNF    Facility/Home Care Agency: [facility]     Pertinent Labs and Imaging  none    Procedures  None      Pertinent Physical Exam At Time of Discharge  Physical Exam  Constitutional:       Appearance: She is not toxic-appearing or diaphoretic.   HENT:      Head: Normocephalic and atraumatic.      Mouth/Throat:      Mouth: Mucous membranes are moist.      Pharynx: No oropharyngeal exudate.     Eyes:      General: No scleral icterus.     Pupils: Pupils are equal, round, and reactive to light.       Cardiovascular:      Rate and Rhythm: Normal rate and regular rhythm.      Pulses: Normal pulses.      Heart sounds: No murmur heard.  Pulmonary:      Effort: Pulmonary effort is normal. No respiratory distress.     Skin:     General: Skin is warm.      Coloration: Skin is not jaundiced.     Psychiatric:         Mood and Affect: Mood normal.         Behavior: Behavior normal.         Thought Content: Thought content normal.         Home Medications     Medication List      START taking these medications     atorvastatin 10 mg tablet; Commonly known as: Lipitor; Take 1 tablet (10   mg) by mouth once daily at bedtime.     CHANGE how you take these medications     levETIRAcetam 500 mg tablet; Commonly known as: Keppra; Take 1 tablet   (500 mg) by mouth 2 times a day.; What changed: how much to take, how to   take this, when to take this, additional instructions     CONTINUE taking these medications     acetaminophen 325 mg tablet; Commonly known as: Tylenol   anastrozole 1 mg tablet; Commonly known as: Arimidex; Take 1 tablet (1   mg total) by mouth once daily.   ascorbic acid (vitamin C) 500 mg capsule   aspirin 81 mg EC tablet   cholecalciferol 50 mcg (2,000 units) tablet; Commonly known as: Vitamin   D-3   metoprolol succinate  mg 24 hr tablet; Commonly known as:   Toprol-XL; Take 1 tablet (100 mg) by mouth once daily.   QUEtiapine 25 mg tablet; Commonly known as: SEROquel; Take 0.5 tablets   (12.5 mg) by mouth 2 times a day.   sertraline 25 mg tablet; Commonly known as: Zoloft; Take 1 tablet (25   mg) by mouth once  daily.   valsartan 320 mg tablet; Commonly known as: Diovan; Take 1 tablet (320   mg) by mouth once daily.       Outpatient Follow-Up  Future Appointments   Date Time Provider Department [place]   8/21/2025  3:30 PM MANJINDER AmbrizCNP [id number] [facility]   8/26/2025 10:40 AM Handy Mike MD [id number] [facility]   1/22/2026 11:30 AM OSCAR Ambriz [id number] [facility]   2/23/2026  1:30 PM Juliann Garcia MD [id number] [facility]   7/27/2026 11:00 AM Heriberto Lopes MD [id number] [facility]         DISCHARGE TIME: >30 minutes were spent on discharge coordination and planning.      Electronically signed by Christine Byrd MD on 08/11/25 at 12:09 PM

## 2025-08-11 NOTE — NURSING NOTE
Patient discharged to [facility] today. A copy of the Goldenrod, AVS, and medication summary were printed and sent with her to the facility in a discharge folder. Her IV was removed with tip intact. Her few belongings were packed. Nursing report was called to the facility by her bedside nurse Lary ARGUELLO. She was transported to the facility by the [facility] ambulance team.

## 2025-08-11 NOTE — PROGRESS NOTES
08/11/25 0912   Rapid Rounds   Attendance Provider;Care Transitions   Expected Discharge Disposition SNF   Review at Escalation Rounds No escalation needed        08/11/25 1051   Discharge Planning   Home or Post Acute Services Post acute facilities (Rehab/SNF/etc)  ([facility])   Expected Discharge Disposition SNF   Does the patient need discharge transport arranged? Yes   [facility] coordination needed? Yes   Has discharge transport been arranged? Yes   What day is the transport expected? 08/11/25   What time is the transport expected? 1530     Patient will discharge to [facility] today. HENS completed by the DSC team. Final goldenrod and AVS sent to facility. Transportation is arranged for 3:30 pm via [facility]. Nurse given number for report. Daughter is aware of discharge and transportation time.    Number for report: [phone]     Malu Washington RN, BSN  Transitional Care Coordinator

## 2025-08-20 DIAGNOSIS — R41.0 CONFUSION: ICD-10-CM

## 2025-08-21 ENCOUNTER — APPOINTMENT (OUTPATIENT)
Dept: HEMATOLOGY/ONCOLOGY | Facility: CLINIC | Age: 89
End: 2025-08-21
Payer: MEDICARE

## 2025-08-23 RX ORDER — QUETIAPINE FUMARATE 25 MG/1
12.5 TABLET, FILM COATED ORAL 2 TIMES DAILY
Qty: 60 TABLET | Refills: 0 | Status: SHIPPED | OUTPATIENT
Start: 2025-08-23

## 2025-08-25 ENCOUNTER — APPOINTMENT (OUTPATIENT)
Dept: CARDIOLOGY | Facility: CLINIC | Age: 89
End: 2025-08-25
Payer: MEDICARE

## 2025-08-25 RX ORDER — GLUCOSAMINE/CHONDR SU A SOD 750-600 MG
1 TABLET ORAL DAILY
COMMUNITY
End: 2025-08-26 | Stop reason: WASHOUT

## 2025-08-26 ENCOUNTER — OFFICE VISIT (OUTPATIENT)
Dept: CARDIOLOGY | Facility: CLINIC | Age: 89
End: 2025-08-26
Payer: MEDICARE

## 2025-08-26 VITALS
HEART RATE: 75 BPM | BODY MASS INDEX: 23.75 KG/M2 | DIASTOLIC BLOOD PRESSURE: 75 MMHG | OXYGEN SATURATION: 97 % | SYSTOLIC BLOOD PRESSURE: 123 MMHG | HEIGHT: 60 IN | WEIGHT: 121 LBS

## 2025-08-26 DIAGNOSIS — I10 HYPERTENSION, UNSPECIFIED TYPE: ICD-10-CM

## 2025-08-26 DIAGNOSIS — I51.7 LVH (LEFT VENTRICULAR HYPERTROPHY): Primary | ICD-10-CM

## 2025-08-26 PROBLEM — I50.9 HEART FAILURE, UNSPECIFIED: Status: RESOLVED | Noted: 2021-12-30 | Resolved: 2025-08-26

## 2025-08-26 PROBLEM — I42.2 HYPERTROPHIC CARDIOMYOPATHY (MULTI): Status: RESOLVED | Noted: 2023-09-20 | Resolved: 2025-08-26

## 2025-08-26 PROBLEM — E78.2 COMBINED HYPERLIPIDEMIA: Status: RESOLVED | Noted: 2023-09-20 | Resolved: 2025-08-26

## 2025-08-26 PROBLEM — I21.4 NSTEMI (NON-ST ELEVATED MYOCARDIAL INFARCTION) (MULTI): Status: RESOLVED | Noted: 2025-08-02 | Resolved: 2025-08-26

## 2025-08-26 PROBLEM — Z86.79 HISTORY OF RHEUMATIC FEVER: Status: RESOLVED | Noted: 2024-01-17 | Resolved: 2025-08-26

## 2025-08-26 PROCEDURE — 1111F DSCHRG MED/CURRENT MED MERGE: CPT | Performed by: INTERNAL MEDICINE

## 2025-08-26 PROCEDURE — 3074F SYST BP LT 130 MM HG: CPT | Performed by: INTERNAL MEDICINE

## 2025-08-26 PROCEDURE — 1126F AMNT PAIN NOTED NONE PRSNT: CPT | Performed by: INTERNAL MEDICINE

## 2025-08-26 PROCEDURE — 99214 OFFICE O/P EST MOD 30 MIN: CPT | Performed by: INTERNAL MEDICINE

## 2025-08-26 PROCEDURE — 1159F MED LIST DOCD IN RCRD: CPT | Performed by: INTERNAL MEDICINE

## 2025-08-26 PROCEDURE — 99212 OFFICE O/P EST SF 10 MIN: CPT

## 2025-08-26 PROCEDURE — 3078F DIAST BP <80 MM HG: CPT | Performed by: INTERNAL MEDICINE

## 2025-08-26 ASSESSMENT — ENCOUNTER SYMPTOMS
HEMOPTYSIS: 0
CONSTIPATION: 0
ALTERED MENTAL STATUS: 0
FEVER: 0
DIARRHEA: 0
HEADACHES: 0
DYSURIA: 0
NAUSEA: 0
OCCASIONAL FEELINGS OF UNSTEADINESS: 1
CHILLS: 0
WHEEZING: 0
BLOATING: 0
MEMORY LOSS: 0
DEPRESSION: 0
HEMATURIA: 0
LOSS OF SENSATION IN FEET: 0
FALLS: 0
VOMITING: 0
COUGH: 0
ABDOMINAL PAIN: 0
MYALGIAS: 0

## 2025-08-26 ASSESSMENT — PATIENT HEALTH QUESTIONNAIRE - PHQ9
SUM OF ALL RESPONSES TO PHQ9 QUESTIONS 1 AND 2: 1
1. LITTLE INTEREST OR PLEASURE IN DOING THINGS: NOT AT ALL
2. FEELING DOWN, DEPRESSED OR HOPELESS: SEVERAL DAYS

## 2025-08-26 ASSESSMENT — PAIN SCALES - GENERAL: PAINLEVEL_OUTOF10: 0-NO PAIN

## 2025-08-26 ASSESSMENT — COLUMBIA-SUICIDE SEVERITY RATING SCALE - C-SSRS
2. HAVE YOU ACTUALLY HAD ANY THOUGHTS OF KILLING YOURSELF?: NO
1. IN THE PAST MONTH, HAVE YOU WISHED YOU WERE DEAD OR WISHED YOU COULD GO TO SLEEP AND NOT WAKE UP?: NO
6. HAVE YOU EVER DONE ANYTHING, STARTED TO DO ANYTHING, OR PREPARED TO DO ANYTHING TO END YOUR LIFE?: NO

## 2025-09-11 ENCOUNTER — APPOINTMENT (OUTPATIENT)
Dept: RADIOLOGY | Facility: HOSPITAL | Age: 89
End: 2025-09-11
Payer: MEDICARE

## 2026-07-27 ENCOUNTER — APPOINTMENT (OUTPATIENT)
Dept: PRIMARY CARE | Facility: CLINIC | Age: OVER 89
End: 2026-07-27
Payer: MEDICARE